# Patient Record
Sex: MALE | Race: WHITE | Employment: OTHER | URBAN - METROPOLITAN AREA
[De-identification: names, ages, dates, MRNs, and addresses within clinical notes are randomized per-mention and may not be internally consistent; named-entity substitution may affect disease eponyms.]

---

## 2018-05-10 PROBLEM — I62.9 INTRACRANIAL HEMORRHAGE (HCC): Status: ACTIVE | Noted: 2018-05-10

## 2018-05-22 PROBLEM — E11.9 DM (DIABETES MELLITUS), TYPE 2 (HCC): Status: ACTIVE | Noted: 2018-05-22

## 2018-05-22 PROBLEM — G45.9 TIA (TRANSIENT ISCHEMIC ATTACK): Status: ACTIVE | Noted: 2018-05-22

## 2018-07-27 ENCOUNTER — OFFICE VISIT (OUTPATIENT)
Dept: VASCULAR SURGERY | Age: 68
End: 2018-07-27

## 2018-07-27 VITALS
SYSTOLIC BLOOD PRESSURE: 130 MMHG | WEIGHT: 170 LBS | DIASTOLIC BLOOD PRESSURE: 68 MMHG | HEIGHT: 67 IN | BODY MASS INDEX: 26.68 KG/M2

## 2018-07-27 DIAGNOSIS — I65.22 LEFT CAROTID ARTERY STENOSIS: ICD-10-CM

## 2018-07-27 PROCEDURE — 3017F COLORECTAL CA SCREEN DOC REV: CPT | Performed by: SURGERY

## 2018-07-27 PROCEDURE — 99204 OFFICE O/P NEW MOD 45 MIN: CPT | Performed by: SURGERY

## 2018-07-27 PROCEDURE — 1123F ACP DISCUSS/DSCN MKR DOCD: CPT | Performed by: SURGERY

## 2018-07-27 PROCEDURE — 1101F PT FALLS ASSESS-DOCD LE1/YR: CPT | Performed by: SURGERY

## 2018-07-27 PROCEDURE — 4040F PNEUMOC VAC/ADMIN/RCVD: CPT | Performed by: SURGERY

## 2018-07-27 PROCEDURE — 1036F TOBACCO NON-USER: CPT | Performed by: SURGERY

## 2018-07-27 PROCEDURE — G8419 CALC BMI OUT NRM PARAM NOF/U: HCPCS | Performed by: SURGERY

## 2018-07-27 PROCEDURE — G8427 DOCREV CUR MEDS BY ELIG CLIN: HCPCS | Performed by: SURGERY

## 2018-07-27 PROCEDURE — G8598 ASA/ANTIPLAT THER USED: HCPCS | Performed by: SURGERY

## 2018-08-01 ENCOUNTER — SURG/PROC ORDERS (OUTPATIENT)
Dept: CARDIOTHORACIC SURGERY | Age: 68
End: 2018-08-01

## 2018-08-01 DIAGNOSIS — I65.22 OCCLUSION OF LEFT CAROTID ARTERY: Primary | ICD-10-CM

## 2018-08-14 ENCOUNTER — TELEPHONE (OUTPATIENT)
Dept: VASCULAR SURGERY | Age: 68
End: 2018-08-14

## 2018-08-14 NOTE — TELEPHONE ENCOUNTER
Called patient as reminder of surgery or 8/23/19 at [facility]. Being admitted on 8/22/18. Arrive at 7:00am as 9:00am is time of csf drain. Told patient to be npo. yuval

## 2018-08-20 ENCOUNTER — ANESTHESIA EVENT (OUTPATIENT)
Dept: OPERATING ROOM | Age: 68
DRG: 254 | End: 2018-08-20
Payer: OTHER GOVERNMENT

## 2018-08-23 ENCOUNTER — TELEPHONE (OUTPATIENT)
Dept: VASCULAR SURGERY | Age: 68
End: 2018-08-23

## 2018-08-25 NOTE — ANESTHESIA PRE PROCEDURE
 ondansetron (ZOFRAN) 4 MG tablet Take 1 tablet by mouth every 8 hours as needed for Nausea 10 tablet 0    insulin glargine (LANTUS) 100 UNIT/ML injection Inject 10 Units into the skin nightly. (Patient taking differently: Inject 20 Units into the skin 2 times daily ) 3 mL 12    pregabalin (LYRICA) 100 MG capsule Take 100 mg by mouth 2 times daily          Allergies: Allergies   Allergen Reactions    Lisinopril      \"Makes me feel goofy\"       Problem List:    Patient Active Problem List   Diagnosis Code    Type II or unspecified type diabetes mellitus without mention of complication, not stated as uncontrolled E11.9    Chronic back pain M54.9, G89.29    Hypertension I10    Hyperlipidemia E78.5    Gout     Carotid artery occlusion I65.29    Cerebrovascular disease I67.9    Intracranial hemorrhage (HCC) I62.9    TIA (transient ischemic attack) G45.9    DM (diabetes mellitus), type 2 ([address] E11.9    Left carotid artery stenosis D58.15       Past Medical History:        Diagnosis Date    Carotid artery occlusion 3/10    60-69% left (neg stress test 5/10)    Cerebral artery occlusion with cerebral infarction ([address]     Cerebrovascular disease 3/10    ? TIA    Chronic back pain      Dr Geovanny Lamas - pain management    Gout     Hyperlipidemia     Hypertension     Routine health maintenance     Type II or unspecified type diabetes mellitus without mention of complication, not stated as uncontrolled     eye exam 5/30       Past Surgical History:        Procedure Laterality Date    PROSTATE SURGERY      neg prostate biopsy 1-09   [facility] SHOULDER SURGERY      JOINT CLEANING    TURP  2017       Social History:    Social History   Substance Use Topics    Smoking status: Never Smoker    Smokeless tobacco: Never Used    Alcohol use No                                Counseling given: Not Answered      Vital Signs (Current):   Vitals:    08/17/18 1455   Weight: 170 lb (77.1 kg)   Height: 5' 7\" (1.702 m) BP Readings from Last 3 Encounters:   07/27/18 130/68   05/24/18 134/84   05/22/18 (!) 128/101       NPO Status:                                                                                 BMI:   Wt Readings from Last 3 Encounters:   07/27/18 170 lb (77.1 kg)   05/22/18 173 lb 8 oz (78.7 kg)   05/22/18 190 lb (86.2 kg)     Body mass index is 26.63 kg/m². CBC:   Lab Results   Component Value Date    WBC 9.6 05/22/2018    RBC 4.54 05/22/2018    HGB 13.9 05/22/2018    HCT 40.0 05/22/2018    MCV 88.0 05/22/2018    RDW 13.2 05/22/2018     05/22/2018       CMP:   Lab Results   Component Value Date     05/22/2018    K 4.5 05/22/2018     05/22/2018    CO2 27 05/22/2018    BUN 19 05/22/2018    CREATININE 1.3 05/22/2018    GFRAA >60 05/22/2018    GFRAA >60 03/01/2010    AGRATIO 1.4 05/22/2018    LABGLOM 55 05/22/2018    GLUCOSE 102 05/22/2018    PROT 8.0 05/22/2018    CALCIUM 10.2 05/22/2018    BILITOT 0.6 05/22/2018    ALKPHOS 108 05/22/2018    AST 16 05/22/2018    ALT 8 05/22/2018       POC Tests: No results for input(s): POCGLU, POCNA, POCK, POCCL, POCBUN, POCHEMO, POCHCT in the last 72 hours.     Coags:   Lab Results   Component Value Date    PROTIME 11.3 05/10/2018    INR 0.97 05/10/2018    APTT 26.8 05/10/2018       HCG (If Applicable): No results found for: PREGTESTUR, PREGSERUM, HCG, HCGQUANT     ABGs: No results found for: PHART, PO2ART, XOZ7SWB, RVD5LUD, BEART, H6VZYMQJ     Type & Screen (If Applicable):  No results found for: [facility]    Anesthesia Evaluation  Patient summary reviewed and Nursing notes reviewed no history of anesthetic complications:   Airway: Mallampati: II     Neck ROM: full   Dental:          Pulmonary:Negative Pulmonary ROS and normal exam                               Cardiovascular:Negative CV ROS    (+) hypertension:, hyperlipidemia                  Neuro/Psych:   Negative Neuro/Psych ROS  (+) CVA:, TIA,

## 2018-08-27 ENCOUNTER — ANESTHESIA (OUTPATIENT)
Dept: OPERATING ROOM | Age: 68
DRG: 254 | End: 2018-08-27
Payer: OTHER GOVERNMENT

## 2018-08-27 ENCOUNTER — HOSPITAL ENCOUNTER (INPATIENT)
Age: 68
LOS: 1 days | Discharge: HOME | DRG: 254 | End: 2018-08-28
Attending: SURGERY | Admitting: SURGERY
Payer: OTHER GOVERNMENT

## 2018-08-27 VITALS
SYSTOLIC BLOOD PRESSURE: 153 MMHG | TEMPERATURE: 96.5 F | DIASTOLIC BLOOD PRESSURE: 83 MMHG | RESPIRATION RATE: 23 BRPM | OXYGEN SATURATION: 100 %

## 2018-08-27 DIAGNOSIS — I65.22 OCCLUSION OF LEFT CAROTID ARTERY: ICD-10-CM

## 2018-08-27 LAB
ANION GAP SERPL CALCULATED.3IONS-SCNC: 12 MMOL/L (ref 3–16)
BASOPHILS ABSOLUTE: 0 K/UL (ref 0–0.2)
BASOPHILS RELATIVE PERCENT: 0.5 %
BILIRUBIN URINE: NEGATIVE
BLOOD, URINE: NEGATIVE
BUN BLDV-MCNC: 23 MG/DL (ref 7–20)
CALCIUM SERPL-MCNC: 9.3 MG/DL (ref 8.3–10.6)
CHLORIDE BLD-SCNC: 102 MMOL/L (ref 99–110)
CLARITY: CLEAR
CO2: 26 MMOL/L (ref 21–32)
COLOR: YELLOW
CREAT SERPL-MCNC: 1 MG/DL (ref 0.8–1.3)
EOSINOPHILS ABSOLUTE: 0.2 K/UL (ref 0–0.6)
EOSINOPHILS RELATIVE PERCENT: 3.9 %
GFR AFRICAN AMERICAN: >60
GFR NON-AFRICAN AMERICAN: >60
GLUCOSE BLD-MCNC: 118 MG/DL (ref 70–99)
GLUCOSE BLD-MCNC: 163 MG/DL (ref 70–99)
GLUCOSE BLD-MCNC: 164 MG/DL (ref 70–99)
GLUCOSE BLD-MCNC: 173 MG/DL (ref 70–99)
GLUCOSE BLD-MCNC: 95 MG/DL (ref 70–99)
GLUCOSE URINE: NEGATIVE MG/DL
HCT VFR BLD CALC: 35.9 % (ref 40.5–52.5)
HEMOGLOBIN: 12.4 G/DL (ref 13.5–17.5)
KETONES, URINE: NEGATIVE MG/DL
LEUKOCYTE ESTERASE, URINE: NEGATIVE
LYMPHOCYTES ABSOLUTE: 1.6 K/UL (ref 1–5.1)
LYMPHOCYTES RELATIVE PERCENT: 28.3 %
MCH RBC QN AUTO: 30.9 PG (ref 26–34)
MCHC RBC AUTO-ENTMCNC: 34.6 G/DL (ref 31–36)
MCV RBC AUTO: 89.3 FL (ref 80–100)
MICROSCOPIC EXAMINATION: NORMAL
MONOCYTES ABSOLUTE: 0.2 K/UL (ref 0–1.3)
MONOCYTES RELATIVE PERCENT: 4.2 %
NEUTROPHILS ABSOLUTE: 3.6 K/UL (ref 1.7–7.7)
NEUTROPHILS RELATIVE PERCENT: 63.1 %
NITRITE, URINE: NEGATIVE
PDW BLD-RTO: 13.4 % (ref 12.4–15.4)
PERFORMED ON: ABNORMAL
PERFORMED ON: NORMAL
PH UA: 6.5
PLATELET # BLD: 175 K/UL (ref 135–450)
PMV BLD AUTO: 7.9 FL (ref 5–10.5)
POTASSIUM REFLEX MAGNESIUM: 4.5 MMOL/L (ref 3.5–5.1)
PROTEIN UA: NEGATIVE MG/DL
RBC # BLD: 4.02 M/UL (ref 4.2–5.9)
SODIUM BLD-SCNC: 140 MMOL/L (ref 136–145)
SPECIFIC GRAVITY UA: 1.01
URINE TYPE: NORMAL
UROBILINOGEN, URINE: 0.2 E.U./DL
WBC # BLD: 5.8 K/UL (ref 4–11)

## 2018-08-27 PROCEDURE — 3600000017 HC SURGERY HYBRID ADDL 15MIN: Performed by: SURGERY

## 2018-08-27 PROCEDURE — 2580000003 HC RX 258: Performed by: ANESTHESIOLOGY

## 2018-08-27 PROCEDURE — 7100000000 HC PACU RECOVERY - FIRST 15 MIN: Performed by: SURGERY

## 2018-08-27 PROCEDURE — 2580000003 HC RX 258: Performed by: SURGERY

## 2018-08-27 PROCEDURE — 35301 RECHANNELING OF ARTERY: CPT | Performed by: SURGERY

## 2018-08-27 PROCEDURE — 2500000003 HC RX 250 WO HCPCS: Performed by: NURSE ANESTHETIST, CERTIFIED REGISTERED

## 2018-08-27 PROCEDURE — 36415 COLL VENOUS BLD VENIPUNCTURE: CPT

## 2018-08-27 PROCEDURE — 3600000007 HC SURGERY HYBRID BASE: Performed by: SURGERY

## 2018-08-27 PROCEDURE — 2709999900 HC NON-CHARGEABLE SUPPLY: Performed by: SURGERY

## 2018-08-27 PROCEDURE — 6370000000 HC RX 637 (ALT 250 FOR IP): Performed by: SURGERY

## 2018-08-27 PROCEDURE — 2720000010 HC SURG SUPPLY STERILE: Performed by: SURGERY

## 2018-08-27 PROCEDURE — 03CL0ZZ EXTIRPATION OF MATTER FROM LEFT INTERNAL CAROTID ARTERY, OPEN APPROACH: ICD-10-PCS | Performed by: SURGERY

## 2018-08-27 PROCEDURE — 6360000002 HC RX W HCPCS: Performed by: SURGERY

## 2018-08-27 PROCEDURE — C1768 GRAFT, VASCULAR: HCPCS | Performed by: SURGERY

## 2018-08-27 PROCEDURE — 3700000001 HC ADD 15 MINUTES (ANESTHESIA): Performed by: SURGERY

## 2018-08-27 PROCEDURE — 7100000001 HC PACU RECOVERY - ADDTL 15 MIN: Performed by: SURGERY

## 2018-08-27 PROCEDURE — 2500000003 HC RX 250 WO HCPCS: Performed by: SURGERY

## 2018-08-27 PROCEDURE — 80048 BASIC METABOLIC PNL TOTAL CA: CPT

## 2018-08-27 PROCEDURE — 83036 HEMOGLOBIN GLYCOSYLATED A1C: CPT

## 2018-08-27 PROCEDURE — 6360000002 HC RX W HCPCS: Performed by: NURSE ANESTHETIST, CERTIFIED REGISTERED

## 2018-08-27 PROCEDURE — 3700000000 HC ANESTHESIA ATTENDED CARE: Performed by: SURGERY

## 2018-08-27 PROCEDURE — 2060000000 HC ICU INTERMEDIATE R&B

## 2018-08-27 PROCEDURE — 94150 VITAL CAPACITY TEST: CPT

## 2018-08-27 PROCEDURE — 2580000003 HC RX 258: Performed by: NURSE ANESTHETIST, CERTIFIED REGISTERED

## 2018-08-27 PROCEDURE — 85025 COMPLETE CBC W/AUTO DIFF WBC: CPT

## 2018-08-27 PROCEDURE — 81003 URINALYSIS AUTO W/O SCOPE: CPT

## 2018-08-27 PROCEDURE — 2780000010 HC IMPLANT OTHER: Performed by: SURGERY

## 2018-08-27 PROCEDURE — 6360000002 HC RX W HCPCS: Performed by: ANESTHESIOLOGY

## 2018-08-27 DEVICE — XENOSURE BIOLOGIC PATCH, 0.8CM X 8CM, EIFU
Type: IMPLANTABLE DEVICE | Site: NECK | Status: FUNCTIONAL
Brand: XENOSURE BIOLOGIC PATCH

## 2018-08-27 RX ORDER — HYDROCODONE BITARTRATE AND ACETAMINOPHEN 5; 325 MG/1; MG/1
2 TABLET ORAL EVERY 4 HOURS PRN
Status: DISCONTINUED | OUTPATIENT
Start: 2018-08-27 | End: 2018-08-28 | Stop reason: HOSPADM

## 2018-08-27 RX ORDER — OXYCODONE HYDROCHLORIDE AND ACETAMINOPHEN 5; 325 MG/1; MG/1
2 TABLET ORAL PRN
Status: DISCONTINUED | OUTPATIENT
Start: 2018-08-27 | End: 2018-08-27 | Stop reason: HOSPADM

## 2018-08-27 RX ORDER — MIDAZOLAM HYDROCHLORIDE 1 MG/ML
INJECTION INTRAMUSCULAR; INTRAVENOUS PRN
Status: DISCONTINUED | OUTPATIENT
Start: 2018-08-27 | End: 2018-08-27 | Stop reason: SDUPTHER

## 2018-08-27 RX ORDER — FENTANYL CITRATE 50 UG/ML
INJECTION, SOLUTION INTRAMUSCULAR; INTRAVENOUS PRN
Status: DISCONTINUED | OUTPATIENT
Start: 2018-08-27 | End: 2018-08-27 | Stop reason: SDUPTHER

## 2018-08-27 RX ORDER — PROTAMINE SULFATE 10 MG/ML
INJECTION, SOLUTION INTRAVENOUS PRN
Status: DISCONTINUED | OUTPATIENT
Start: 2018-08-27 | End: 2018-08-27 | Stop reason: SDUPTHER

## 2018-08-27 RX ORDER — NICOTINE POLACRILEX 4 MG
15 LOZENGE BUCCAL PRN
Status: DISCONTINUED | OUTPATIENT
Start: 2018-08-27 | End: 2018-08-28 | Stop reason: HOSPADM

## 2018-08-27 RX ORDER — DIPHENHYDRAMINE HYDROCHLORIDE 50 MG/ML
12.5 INJECTION INTRAMUSCULAR; INTRAVENOUS
Status: DISCONTINUED | OUTPATIENT
Start: 2018-08-27 | End: 2018-08-27 | Stop reason: HOSPADM

## 2018-08-27 RX ORDER — NITROGLYCERIN 20 MG/100ML
5 INJECTION INTRAVENOUS CONTINUOUS PRN
Status: DISCONTINUED | OUTPATIENT
Start: 2018-08-27 | End: 2018-08-28 | Stop reason: HOSPADM

## 2018-08-27 RX ORDER — EPHEDRINE SULFATE 50 MG/ML
INJECTION INTRAVENOUS PRN
Status: DISCONTINUED | OUTPATIENT
Start: 2018-08-27 | End: 2018-08-27 | Stop reason: SDUPTHER

## 2018-08-27 RX ORDER — NITROGLYCERIN 5 MG/ML
INJECTION, SOLUTION INTRAVENOUS PRN
Status: DISCONTINUED | OUTPATIENT
Start: 2018-08-27 | End: 2018-08-27 | Stop reason: SDUPTHER

## 2018-08-27 RX ORDER — LIDOCAINE HYDROCHLORIDE 20 MG/ML
INJECTION, SOLUTION INFILTRATION; PERINEURAL PRN
Status: DISCONTINUED | OUTPATIENT
Start: 2018-08-27 | End: 2018-08-27 | Stop reason: SDUPTHER

## 2018-08-27 RX ORDER — HYDRALAZINE HYDROCHLORIDE 20 MG/ML
5 INJECTION INTRAMUSCULAR; INTRAVENOUS EVERY 10 MIN PRN
Status: DISCONTINUED | OUTPATIENT
Start: 2018-08-27 | End: 2018-08-27 | Stop reason: HOSPADM

## 2018-08-27 RX ORDER — HEPARIN SODIUM 1000 [USP'U]/ML
INJECTION, SOLUTION INTRAVENOUS; SUBCUTANEOUS PRN
Status: DISCONTINUED | OUTPATIENT
Start: 2018-08-27 | End: 2018-08-27 | Stop reason: SDUPTHER

## 2018-08-27 RX ORDER — ONDANSETRON 4 MG/1
4 TABLET, FILM COATED ORAL EVERY 8 HOURS PRN
Status: DISCONTINUED | OUTPATIENT
Start: 2018-08-27 | End: 2018-08-27 | Stop reason: CLARIF

## 2018-08-27 RX ORDER — DEXTROSE MONOHYDRATE 50 MG/ML
100 INJECTION, SOLUTION INTRAVENOUS PRN
Status: DISCONTINUED | OUTPATIENT
Start: 2018-08-27 | End: 2018-08-28 | Stop reason: HOSPADM

## 2018-08-27 RX ORDER — MEPERIDINE HYDROCHLORIDE 50 MG/ML
12.5 INJECTION INTRAMUSCULAR; INTRAVENOUS; SUBCUTANEOUS EVERY 5 MIN PRN
Status: DISCONTINUED | OUTPATIENT
Start: 2018-08-27 | End: 2018-08-27 | Stop reason: HOSPADM

## 2018-08-27 RX ORDER — OXYCODONE HYDROCHLORIDE AND ACETAMINOPHEN 5; 325 MG/1; MG/1
1 TABLET ORAL PRN
Status: DISCONTINUED | OUTPATIENT
Start: 2018-08-27 | End: 2018-08-27 | Stop reason: HOSPADM

## 2018-08-27 RX ORDER — SODIUM CHLORIDE 9 MG/ML
INJECTION, SOLUTION INTRAVENOUS CONTINUOUS
Status: DISCONTINUED | OUTPATIENT
Start: 2018-08-27 | End: 2018-08-28 | Stop reason: HOSPADM

## 2018-08-27 RX ORDER — SODIUM CHLORIDE, SODIUM LACTATE, POTASSIUM CHLORIDE, CALCIUM CHLORIDE 600; 310; 30; 20 MG/100ML; MG/100ML; MG/100ML; MG/100ML
INJECTION, SOLUTION INTRAVENOUS
Status: COMPLETED
Start: 2018-08-27 | End: 2018-08-27

## 2018-08-27 RX ORDER — ONDANSETRON 2 MG/ML
4 INJECTION INTRAMUSCULAR; INTRAVENOUS PRN
Status: DISCONTINUED | OUTPATIENT
Start: 2018-08-27 | End: 2018-08-27 | Stop reason: HOSPADM

## 2018-08-27 RX ORDER — MORPHINE SULFATE 4 MG/ML
2 INJECTION, SOLUTION INTRAMUSCULAR; INTRAVENOUS EVERY 5 MIN PRN
Status: DISCONTINUED | OUTPATIENT
Start: 2018-08-27 | End: 2018-08-27 | Stop reason: HOSPADM

## 2018-08-27 RX ORDER — MORPHINE SULFATE 4 MG/ML
1 INJECTION, SOLUTION INTRAMUSCULAR; INTRAVENOUS EVERY 5 MIN PRN
Status: DISCONTINUED | OUTPATIENT
Start: 2018-08-27 | End: 2018-08-27 | Stop reason: HOSPADM

## 2018-08-27 RX ORDER — LABETALOL HYDROCHLORIDE 5 MG/ML
INJECTION, SOLUTION INTRAVENOUS PRN
Status: DISCONTINUED | OUTPATIENT
Start: 2018-08-27 | End: 2018-08-27 | Stop reason: SDUPTHER

## 2018-08-27 RX ORDER — ONDANSETRON 2 MG/ML
INJECTION INTRAMUSCULAR; INTRAVENOUS PRN
Status: DISCONTINUED | OUTPATIENT
Start: 2018-08-27 | End: 2018-08-27 | Stop reason: SDUPTHER

## 2018-08-27 RX ORDER — MORPHINE SULFATE 4 MG/ML
4 INJECTION, SOLUTION INTRAMUSCULAR; INTRAVENOUS
Status: DISCONTINUED | OUTPATIENT
Start: 2018-08-27 | End: 2018-08-28 | Stop reason: HOSPADM

## 2018-08-27 RX ORDER — ONDANSETRON 4 MG/1
4 TABLET, ORALLY DISINTEGRATING ORAL EVERY 8 HOURS PRN
Status: DISCONTINUED | OUTPATIENT
Start: 2018-08-27 | End: 2018-08-28 | Stop reason: HOSPADM

## 2018-08-27 RX ORDER — PROMETHAZINE HYDROCHLORIDE 25 MG/ML
6.25 INJECTION, SOLUTION INTRAMUSCULAR; INTRAVENOUS
Status: DISCONTINUED | OUTPATIENT
Start: 2018-08-27 | End: 2018-08-27 | Stop reason: HOSPADM

## 2018-08-27 RX ORDER — MORPHINE SULFATE 2 MG/ML
2 INJECTION, SOLUTION INTRAMUSCULAR; INTRAVENOUS
Status: DISCONTINUED | OUTPATIENT
Start: 2018-08-27 | End: 2018-08-28 | Stop reason: HOSPADM

## 2018-08-27 RX ORDER — ATORVASTATIN CALCIUM 10 MG/1
20 TABLET, FILM COATED ORAL DAILY
Status: DISCONTINUED | OUTPATIENT
Start: 2018-08-27 | End: 2018-08-28 | Stop reason: HOSPADM

## 2018-08-27 RX ORDER — GLYCOPYRROLATE 0.2 MG/ML
INJECTION INTRAMUSCULAR; INTRAVENOUS PRN
Status: DISCONTINUED | OUTPATIENT
Start: 2018-08-27 | End: 2018-08-27 | Stop reason: SDUPTHER

## 2018-08-27 RX ORDER — SODIUM CHLORIDE 0.9 % (FLUSH) 0.9 %
10 SYRINGE (ML) INJECTION PRN
Status: DISCONTINUED | OUTPATIENT
Start: 2018-08-27 | End: 2018-08-28 | Stop reason: HOSPADM

## 2018-08-27 RX ORDER — CLONIDINE HYDROCHLORIDE 0.1 MG/1
0.1 TABLET ORAL
Status: DISCONTINUED | OUTPATIENT
Start: 2018-08-27 | End: 2018-08-28 | Stop reason: HOSPADM

## 2018-08-27 RX ORDER — MORPHINE SULFATE 15 MG/1
15 TABLET, FILM COATED, EXTENDED RELEASE ORAL 3 TIMES DAILY
Status: DISCONTINUED | OUTPATIENT
Start: 2018-08-27 | End: 2018-08-28 | Stop reason: HOSPADM

## 2018-08-27 RX ORDER — HYDRALAZINE HYDROCHLORIDE 20 MG/ML
10 INJECTION INTRAMUSCULAR; INTRAVENOUS
Status: DISCONTINUED | OUTPATIENT
Start: 2018-08-27 | End: 2018-08-28 | Stop reason: HOSPADM

## 2018-08-27 RX ORDER — LABETALOL HYDROCHLORIDE 5 MG/ML
5 INJECTION, SOLUTION INTRAVENOUS EVERY 10 MIN PRN
Status: DISCONTINUED | OUTPATIENT
Start: 2018-08-27 | End: 2018-08-27 | Stop reason: HOSPADM

## 2018-08-27 RX ORDER — SODIUM CHLORIDE, SODIUM LACTATE, POTASSIUM CHLORIDE, CALCIUM CHLORIDE 600; 310; 30; 20 MG/100ML; MG/100ML; MG/100ML; MG/100ML
INJECTION, SOLUTION INTRAVENOUS CONTINUOUS
Status: DISCONTINUED | OUTPATIENT
Start: 2018-08-27 | End: 2018-08-27

## 2018-08-27 RX ORDER — DEXTROSE MONOHYDRATE 25 G/50ML
12.5 INJECTION, SOLUTION INTRAVENOUS PRN
Status: DISCONTINUED | OUTPATIENT
Start: 2018-08-27 | End: 2018-08-28 | Stop reason: HOSPADM

## 2018-08-27 RX ORDER — PROPOFOL 10 MG/ML
INJECTION, EMULSION INTRAVENOUS PRN
Status: DISCONTINUED | OUTPATIENT
Start: 2018-08-27 | End: 2018-08-27 | Stop reason: SDUPTHER

## 2018-08-27 RX ORDER — ACETAMINOPHEN 325 MG/1
650 TABLET ORAL EVERY 4 HOURS PRN
Status: DISCONTINUED | OUTPATIENT
Start: 2018-08-27 | End: 2018-08-28 | Stop reason: HOSPADM

## 2018-08-27 RX ORDER — ASPIRIN 81 MG/1
81 TABLET, CHEWABLE ORAL DAILY
Status: DISCONTINUED | OUTPATIENT
Start: 2018-08-27 | End: 2018-08-28 | Stop reason: HOSPADM

## 2018-08-27 RX ORDER — ONDANSETRON 2 MG/ML
4 INJECTION INTRAMUSCULAR; INTRAVENOUS EVERY 6 HOURS PRN
Status: DISCONTINUED | OUTPATIENT
Start: 2018-08-27 | End: 2018-08-28 | Stop reason: HOSPADM

## 2018-08-27 RX ORDER — INSULIN GLARGINE 100 [IU]/ML
20 INJECTION, SOLUTION SUBCUTANEOUS 2 TIMES DAILY
Status: DISCONTINUED | OUTPATIENT
Start: 2018-08-27 | End: 2018-08-28 | Stop reason: HOSPADM

## 2018-08-27 RX ORDER — ROCURONIUM BROMIDE 10 MG/ML
INJECTION, SOLUTION INTRAVENOUS PRN
Status: DISCONTINUED | OUTPATIENT
Start: 2018-08-27 | End: 2018-08-27 | Stop reason: SDUPTHER

## 2018-08-27 RX ORDER — SODIUM CHLORIDE 0.9 % (FLUSH) 0.9 %
10 SYRINGE (ML) INJECTION EVERY 12 HOURS SCHEDULED
Status: DISCONTINUED | OUTPATIENT
Start: 2018-08-27 | End: 2018-08-28 | Stop reason: HOSPADM

## 2018-08-27 RX ORDER — DOCUSATE SODIUM 100 MG/1
100 CAPSULE, LIQUID FILLED ORAL DAILY
Status: DISCONTINUED | OUTPATIENT
Start: 2018-08-27 | End: 2018-08-28 | Stop reason: HOSPADM

## 2018-08-27 RX ORDER — LIDOCAINE HYDROCHLORIDE 10 MG/ML
1 INJECTION, SOLUTION EPIDURAL; INFILTRATION; INTRACAUDAL; PERINEURAL
Status: DISCONTINUED | OUTPATIENT
Start: 2018-08-27 | End: 2018-08-27 | Stop reason: HOSPADM

## 2018-08-27 RX ORDER — HYDROCODONE BITARTRATE AND ACETAMINOPHEN 5; 325 MG/1; MG/1
1 TABLET ORAL EVERY 4 HOURS PRN
Status: DISCONTINUED | OUTPATIENT
Start: 2018-08-27 | End: 2018-08-28 | Stop reason: HOSPADM

## 2018-08-27 RX ADMIN — SUGAMMADEX 200 MG: 100 INJECTION, SOLUTION INTRAVENOUS at 08:55

## 2018-08-27 RX ADMIN — SODIUM CHLORIDE, POTASSIUM CHLORIDE, SODIUM LACTATE AND CALCIUM CHLORIDE: 600; 310; 30; 20 INJECTION, SOLUTION INTRAVENOUS at 09:00

## 2018-08-27 RX ADMIN — SODIUM CHLORIDE, POTASSIUM CHLORIDE, SODIUM LACTATE AND CALCIUM CHLORIDE: 600; 310; 30; 20 INJECTION, SOLUTION INTRAVENOUS at 07:29

## 2018-08-27 RX ADMIN — ROCURONIUM BROMIDE 50 MG: 10 SOLUTION INTRAVENOUS at 07:29

## 2018-08-27 RX ADMIN — PROTAMINE SULFATE 25 MG: 10 INJECTION, SOLUTION INTRAVENOUS at 08:47

## 2018-08-27 RX ADMIN — LIDOCAINE HYDROCHLORIDE 20 MG: 20 INJECTION, SOLUTION INFILTRATION; PERINEURAL at 07:29

## 2018-08-27 RX ADMIN — NITROGLYCERIN 30 MCG: 5 INJECTION, SOLUTION INTRAVENOUS at 08:45

## 2018-08-27 RX ADMIN — ATORVASTATIN CALCIUM 20 MG: 10 TABLET, FILM COATED ORAL at 12:46

## 2018-08-27 RX ADMIN — GLYCOPYRROLATE 0.2 MG: 0.2 INJECTION, SOLUTION INTRAMUSCULAR; INTRAVENOUS at 08:10

## 2018-08-27 RX ADMIN — LABETALOL HYDROCHLORIDE 10 MG: 5 INJECTION, SOLUTION INTRAVENOUS at 08:12

## 2018-08-27 RX ADMIN — MORPHINE SULFATE 15 MG: 15 TABLET, FILM COATED, EXTENDED RELEASE ORAL at 21:56

## 2018-08-27 RX ADMIN — INSULIN LISPRO 2 UNITS: 100 INJECTION, SOLUTION INTRAVENOUS; SUBCUTANEOUS at 17:56

## 2018-08-27 RX ADMIN — DOCUSATE SODIUM 100 MG: 100 CAPSULE, LIQUID FILLED ORAL at 12:46

## 2018-08-27 RX ADMIN — LABETALOL HYDROCHLORIDE 10 MG: 5 INJECTION, SOLUTION INTRAVENOUS at 08:45

## 2018-08-27 RX ADMIN — PHENYLEPHRINE HYDROCHLORIDE 40 MCG: 10 INJECTION INTRAVENOUS at 08:18

## 2018-08-27 RX ADMIN — METOPROLOL TARTRATE 12.5 MG: 25 TABLET ORAL at 21:56

## 2018-08-27 RX ADMIN — EPHEDRINE SULFATE 10 MG: 50 INJECTION INTRAVENOUS at 07:45

## 2018-08-27 RX ADMIN — MIDAZOLAM HYDROCHLORIDE 2 MG: 2 INJECTION, SOLUTION INTRAMUSCULAR; INTRAVENOUS at 07:29

## 2018-08-27 RX ADMIN — HEPARIN SODIUM 5000 UNITS: 1000 INJECTION, SOLUTION INTRAVENOUS; SUBCUTANEOUS at 08:05

## 2018-08-27 RX ADMIN — ASPIRIN 81 MG 81 MG: 81 TABLET ORAL at 12:46

## 2018-08-27 RX ADMIN — SODIUM CHLORIDE, PRESERVATIVE FREE 10 ML: 5 INJECTION INTRAVENOUS at 23:51

## 2018-08-27 RX ADMIN — HYDROMORPHONE HYDROCHLORIDE 0.5 MG: 1 INJECTION, SOLUTION INTRAMUSCULAR; INTRAVENOUS; SUBCUTANEOUS at 10:44

## 2018-08-27 RX ADMIN — NITROGLYCERIN 50 MCG: 5 INJECTION, SOLUTION INTRAVENOUS at 09:00

## 2018-08-27 RX ADMIN — EPHEDRINE SULFATE 10 MG: 50 INJECTION INTRAVENOUS at 07:35

## 2018-08-27 RX ADMIN — ONDANSETRON 4 MG: 2 INJECTION INTRAMUSCULAR; INTRAVENOUS at 07:35

## 2018-08-27 RX ADMIN — FENTANYL CITRATE 100 MCG: 50 INJECTION INTRAMUSCULAR; INTRAVENOUS at 07:29

## 2018-08-27 RX ADMIN — MORPHINE SULFATE 15 MG: 15 TABLET, FILM COATED, EXTENDED RELEASE ORAL at 14:33

## 2018-08-27 RX ADMIN — EPHEDRINE SULFATE 10 MG: 50 INJECTION INTRAVENOUS at 08:00

## 2018-08-27 RX ADMIN — LABETALOL HYDROCHLORIDE 10 MG: 5 INJECTION, SOLUTION INTRAVENOUS at 09:00

## 2018-08-27 RX ADMIN — HYDROCODONE BITARTRATE AND ACETAMINOPHEN 2 TABLET: 5; 325 TABLET ORAL at 17:59

## 2018-08-27 RX ADMIN — MORPHINE SULFATE 4 MG: 4 INJECTION INTRAVENOUS at 12:49

## 2018-08-27 RX ADMIN — INSULIN GLARGINE 20 UNITS: 100 INJECTION, SOLUTION SUBCUTANEOUS at 21:56

## 2018-08-27 RX ADMIN — SODIUM CHLORIDE: 9 INJECTION, SOLUTION INTRAVENOUS at 15:32

## 2018-08-27 RX ADMIN — NITROGLYCERIN 30 MCG: 5 INJECTION, SOLUTION INTRAVENOUS at 08:13

## 2018-08-27 RX ADMIN — EPHEDRINE SULFATE 10 MG: 50 INJECTION INTRAVENOUS at 07:40

## 2018-08-27 RX ADMIN — PREGABALIN 100 MG: 75 CAPSULE ORAL at 21:56

## 2018-08-27 RX ADMIN — PROPOFOL 190 MG: 10 INJECTION, EMULSION INTRAVENOUS at 07:29

## 2018-08-27 RX ADMIN — Medication 2 G: at 07:25

## 2018-08-27 ASSESSMENT — PULMONARY FUNCTION TESTS
PIF_VALUE: 18
PIF_VALUE: 21
PIF_VALUE: 17
PIF_VALUE: 21
PIF_VALUE: 21
PIF_VALUE: 20
PIF_VALUE: 21
PIF_VALUE: 20
PIF_VALUE: 3
PIF_VALUE: 21
PIF_VALUE: 20
PIF_VALUE: 0
PIF_VALUE: 19
PIF_VALUE: 20
PIF_VALUE: 20
PIF_VALUE: 4
PIF_VALUE: 21
PIF_VALUE: 20
PIF_VALUE: 18
PIF_VALUE: 21
PIF_VALUE: 17
PIF_VALUE: 0
PIF_VALUE: 21
PIF_VALUE: 18
PIF_VALUE: 18
PIF_VALUE: 21
PIF_VALUE: 18
PIF_VALUE: 21
PIF_VALUE: 28
PIF_VALUE: 21
PIF_VALUE: 0
PIF_VALUE: 0
PIF_VALUE: 21
PIF_VALUE: 22
PIF_VALUE: 21
PIF_VALUE: 0
PIF_VALUE: 18
PIF_VALUE: 20
PIF_VALUE: 3
PIF_VALUE: 20
PIF_VALUE: 21
PIF_VALUE: 6
PIF_VALUE: 21
PIF_VALUE: 18
PIF_VALUE: 17
PIF_VALUE: 18
PIF_VALUE: 23
PIF_VALUE: 21
PIF_VALUE: 23
PIF_VALUE: 18
PIF_VALUE: 22
PIF_VALUE: 21
PIF_VALUE: 21
PIF_VALUE: 3
PIF_VALUE: 0
PIF_VALUE: 20
PIF_VALUE: 26
PIF_VALUE: 21
PIF_VALUE: 21
PIF_VALUE: 20
PIF_VALUE: 21
PIF_VALUE: 2
PIF_VALUE: 21
PIF_VALUE: 19
PIF_VALUE: 20
PIF_VALUE: 21
PIF_VALUE: 21
PIF_VALUE: 22
PIF_VALUE: 21
PIF_VALUE: 3
PIF_VALUE: 21
PIF_VALUE: 20
PIF_VALUE: 18
PIF_VALUE: 21
PIF_VALUE: 21
PIF_VALUE: 18
PIF_VALUE: 21
PIF_VALUE: 0
PIF_VALUE: 21
PIF_VALUE: 20
PIF_VALUE: 15
PIF_VALUE: 17
PIF_VALUE: 21
PIF_VALUE: 24
PIF_VALUE: 20
PIF_VALUE: 21
PIF_VALUE: 0
PIF_VALUE: 21
PIF_VALUE: 18
PIF_VALUE: 4
PIF_VALUE: 0
PIF_VALUE: 15
PIF_VALUE: 3
PIF_VALUE: 21

## 2018-08-27 ASSESSMENT — PAIN - FUNCTIONAL ASSESSMENT: PAIN_FUNCTIONAL_ASSESSMENT: 0-10

## 2018-08-27 ASSESSMENT — PAIN SCALES - GENERAL
PAINLEVEL_OUTOF10: 7
PAINLEVEL_OUTOF10: 7
PAINLEVEL_OUTOF10: 4
PAINLEVEL_OUTOF10: 5
PAINLEVEL_OUTOF10: 7
PAINLEVEL_OUTOF10: 6
PAINLEVEL_OUTOF10: 5

## 2018-08-27 ASSESSMENT — PAIN DESCRIPTION - DESCRIPTORS: DESCRIPTORS: ACHING;BURNING;NUMBNESS

## 2018-08-27 NOTE — H&P
H and P    Patient here today to undergo Left Carotid Endarterectomy for high grade asymptomatic stenosis. This was noted in May when he presented with an intracranial bleed in the right posterior cerebral distribution. Etiology of ICH unclear but was felt to be related to HTN as no aneurysms or AVMs identified. Past Medical History:   Diagnosis Date    Carotid artery occlusion 3/10    60-69% left (neg stress test 5/10)    Cerebral artery occlusion with cerebral infarction [facility])     Cerebrovascular disease 3/10    ? TIA    Chronic back pain      Dr Praveen Sandhu - pain management    Gout     Hyperlipidemia     Hypertension     Routine health maintenance     Type II or unspecified type diabetes mellitus without mention of complication, not stated as uncontrolled     eye exam 5/30     Social History:    History   Smoking Status    Never Smoker   Smokeless Tobacco    Never Used     Current Medications:  Current Facility-Administered Medications   Medication Dose Route Frequency Provider Last Rate Last Dose    lactated ringers infusion   Intravenous Continuous Uli Mccall MD        lidocaine PF 1 % injection 1 mL  1 mL Intradermal Once PRN Uli Mccall MD        ceFAZolin (ANCEF) 2 g in sterile water 20 mL IV syringe  2 g Intravenous On Call to Karine Do MD        lactated ringers infusion             HYDROmorphone (DILAUDID) injection 0.25 mg  0.25 mg Intravenous Q5 Min PRN Corry Garcia MD        HYDROmorphone (DILAUDID) injection 0.5 mg  0.5 mg Intravenous Q5 Min PRN Corry Garcia MD        morphine (PF) injection 1 mg  1 mg Intravenous Q5 Min PRN Corry Garcia MD        morphine (PF) injection 2 mg  2 mg Intravenous Q5 Min PRN Corry Garcia MD        oxyCODONE-acetaminophen (PERCOCET) 5-325 MG per tablet 1 tablet  1 tablet Oral PRN Corry Garcia MD        Or    oxyCODONE-acetaminophen (PERCOCET) 5-325 MG per tablet 2 tablet  2 tablet Oral PRN Corry Garcia MD       [address] [place] [address]

## 2018-08-27 NOTE — PLAN OF CARE
Problem: Falls - Risk of:  Goal: Will remain free from falls  Will remain free from falls   Outcome: Ongoing  Pt high fall risk. Instructed to use call light before getting out of bed. Call light within reach. Bed in low position. Bed alarm on. Will continue to monitor. Problem: Risk for Impaired Skin Integrity  Goal: Tissue integrity - skin and mucous membranes  Structural intactness and normal physiological function of skin and  mucous membranes. Outcome: Ongoing  Pt is a Q2H, educated pt on preventing skin breakdown. See flowsheet for assessment    Problem: Pain:  Goal: Pain level will decrease  Pain level will decrease   Outcome: Ongoing  Pt states he undestands the 0-10 numeric pain scale. Pt also states that his pain is being adequately treated and will reach out to report changes.

## 2018-08-27 NOTE — PROGRESS NOTES
Vitals:    08/27/18 1130   BP: 117/68   Pulse: 78   Resp: 11   Temp: 98.2 °F (36.8 °C)   SpO2: 97%     Pt resting quietly in bed alert and oriented. Pt having pain rated 7/10 at this time but denies having further needs. Lactate Ringers infusing at 75 ml/hr. Bed locked in lowest position, call light within reach, bedside table within reach. Will continue to monitor.

## 2018-08-27 NOTE — BRIEF OP NOTE
Brief Postoperative Note  ______________________________________________________________    Patient: Claus Almendarez  YOB: 1950  MRN: [id number]  Date of Procedure: 8/27/2018    Pre-Op Diagnosis: LEFT CAROTID ARTERY STENOSIS    Post-Op Diagnosis: Same       Procedure(s):  LEFT CAROTID ENDARTERECTOMY    Anesthesia: General    Surgeon(s):  Yoseph Stanley MD    Staff:  Surgical Assistant: Andre Holter  Scrub Person First: Chetna Black     Estimated Blood Loss: <26 ml    Complications: None    Implants:    Implant Name Type Inv. Item Serial No.  Lot No. LRB No. Used   LELA-PATCH GRAFT VASC . [id number] . 045MM XENOSURE LF Mesh LELA-PATCH GRAFT VASC . [id number] . 045MM XENOSURE LF   [facility] [employer] [id number] Left 1         Drains:   Closed/Suction Drain Left Neck Bulb 7 [facility] (Active)         Yoseph Stanley MD  Date: 8/27/2018  Time: 8:56 AM

## 2018-08-28 VITALS
OXYGEN SATURATION: 97 % | SYSTOLIC BLOOD PRESSURE: 162 MMHG | RESPIRATION RATE: 16 BRPM | WEIGHT: 170 LBS | HEIGHT: 67 IN | BODY MASS INDEX: 26.68 KG/M2 | HEART RATE: 72 BPM | DIASTOLIC BLOOD PRESSURE: 91 MMHG | TEMPERATURE: 98.2 F

## 2018-08-28 LAB
ESTIMATED AVERAGE GLUCOSE: 168.6 MG/DL
GLUCOSE BLD-MCNC: 109 MG/DL (ref 70–99)
GLUCOSE BLD-MCNC: 118 MG/DL (ref 70–99)
HBA1C MFR BLD: 7.5 %
HCT VFR BLD CALC: 34.9 % (ref 40.5–52.5)
HEMOGLOBIN: 11.9 G/DL (ref 13.5–17.5)
MCH RBC QN AUTO: 30.7 PG (ref 26–34)
MCHC RBC AUTO-ENTMCNC: 34.1 G/DL (ref 31–36)
MCV RBC AUTO: 90.1 FL (ref 80–100)
PDW BLD-RTO: 13.7 % (ref 12.4–15.4)
PERFORMED ON: ABNORMAL
PERFORMED ON: ABNORMAL
PLATELET # BLD: 160 K/UL (ref 135–450)
PMV BLD AUTO: 7.4 FL (ref 5–10.5)
RBC # BLD: 3.87 M/UL (ref 4.2–5.9)
WBC # BLD: 6.9 K/UL (ref 4–11)

## 2018-08-28 PROCEDURE — 2580000003 HC RX 258: Performed by: SURGERY

## 2018-08-28 PROCEDURE — 36415 COLL VENOUS BLD VENIPUNCTURE: CPT

## 2018-08-28 PROCEDURE — 85027 COMPLETE CBC AUTOMATED: CPT

## 2018-08-28 PROCEDURE — 6370000000 HC RX 637 (ALT 250 FOR IP): Performed by: SURGERY

## 2018-08-28 RX ADMIN — HYDROCODONE BITARTRATE AND ACETAMINOPHEN 2 TABLET: 5; 325 TABLET ORAL at 11:25

## 2018-08-28 RX ADMIN — DOCUSATE SODIUM 100 MG: 100 CAPSULE, LIQUID FILLED ORAL at 09:58

## 2018-08-28 RX ADMIN — PREGABALIN 100 MG: 75 CAPSULE ORAL at 09:57

## 2018-08-28 RX ADMIN — SODIUM CHLORIDE, PRESERVATIVE FREE 10 ML: 5 INJECTION INTRAVENOUS at 09:58

## 2018-08-28 RX ADMIN — INSULIN GLARGINE 20 UNITS: 100 INJECTION, SOLUTION SUBCUTANEOUS at 10:00

## 2018-08-28 RX ADMIN — ATORVASTATIN CALCIUM 20 MG: 10 TABLET, FILM COATED ORAL at 09:57

## 2018-08-28 RX ADMIN — METOPROLOL TARTRATE 12.5 MG: 25 TABLET ORAL at 09:58

## 2018-08-28 RX ADMIN — MORPHINE SULFATE 15 MG: 15 TABLET, FILM COATED, EXTENDED RELEASE ORAL at 09:58

## 2018-08-28 RX ADMIN — ASPIRIN 81 MG 81 MG: 81 TABLET ORAL at 09:58

## 2018-08-28 RX ADMIN — HYDROCODONE BITARTRATE AND ACETAMINOPHEN 2 TABLET: 5; 325 TABLET ORAL at 05:00

## 2018-08-28 ASSESSMENT — PAIN DESCRIPTION - LOCATION
LOCATION: BACK

## 2018-08-28 ASSESSMENT — PAIN DESCRIPTION - PAIN TYPE
TYPE: CHRONIC PAIN

## 2018-08-28 ASSESSMENT — PAIN SCALES - GENERAL
PAINLEVEL_OUTOF10: 9
PAINLEVEL_OUTOF10: 8
PAINLEVEL_OUTOF10: 8
PAINLEVEL_OUTOF10: 5
PAINLEVEL_OUTOF10: 8
PAINLEVEL_OUTOF10: 9

## 2018-08-28 ASSESSMENT — PAIN DESCRIPTION - FREQUENCY: FREQUENCY: CONTINUOUS

## 2018-08-28 NOTE — PROGRESS NOTES
Page sent to Dr. Vamsi Taylor regarding discharge and B/P, currently 173/80 after a.m B/P medication given. Awaiting return call. Kam Awan  8/28/2018    1317 writer received call from Dr. Vamsi Taylor, made aware of B/P, ok for pt to go home, pt to call office for follow up appointment in 2 weeks.   Kam Awan  8/28/2018

## 2018-08-28 NOTE — PROGRESS NOTES
Order for pt to be discharged. IV and tele monitor removed. Tele monitor returned to nurses station. Information provided and reviewed on carotid endarterectomy along with activity - no strenuous activity or exercise for 2 weeks, diet, incision care and signs/symptoms to call MD/Krystle with positive teach back. Pt is to call Dr. Deirdre Joshi office for 2 week follow up appointment; number provided. Pt states will call to make an appointment. Pt verbalizes understanding of all discharge instructions having no further questions. Pt transported to exit via wheel chair, with family.   Casi Arriaga  8/28/2018

## 2018-08-28 NOTE — PROGRESS NOTES
Vascular Surgery Progress Note      SUBJECTIVE:  C/o chronic back pain.   No new c/o    OBJECTIVE    Physical  CURRENT VITALS:  /77   Pulse 71   Temp 97.7 °F (36.5 °C) (Oral)   Resp 16   Ht 5' 7\" (1.702 m)   Wt 170 lb (77.1 kg)   SpO2 91%   BMI 26.63 kg/m²   24 HR INTAKE/OUTPUT:    Intake/Output Summary (Last 24 hours) at 08/28/18 0703  Last data filed at 08/28/18 0443   Gross per 24 hour   Intake             1870 ml   Output             1040 ml   Net              830 ml     Incision intact  No hematoma  Tongue midline  Neuro grossly normal    Data  CBC:   Lab Results   Component Value Date    WBC 6.9 08/28/2018    RBC 3.87 08/28/2018    HGB 11.9 08/28/2018    HCT 34.9 08/28/2018    MCV 90.1 08/28/2018    MCH 30.7 08/28/2018    MCHC 34.1 08/28/2018    RDW 13.7 08/28/2018     08/28/2018    MPV 7.4 08/28/2018         ASSESSMENT AND PLAN    POD #1 L CEA   COLIN removed    Home today

## 2018-08-28 NOTE — OP NOTE
[place], [place] [address]                                 OPERATIVE REPORT    PATIENT NAME: Isauro Nicholas                     :        1950  MED REC NO:   [id number]                          ROOM:       8099  ACCOUNT NO:   [de-identified]                           ADMIT DATE: 2018  PROVIDER:     Suresh Lopez MD    DATE OF PROCEDURE:  2018    PREOPERATIVE DIAGNOSIS:  High-grade left carotid stenosis. POSTOPERATIVE DIAGNOSIS:  High-grade left carotid stenosis. PROCEDURE:  Left carotid endarterectomy. ANESTHESIA:  General endotracheal.    INDICATIONS:  The patient is 42-year-old male with known carotid disease. Recent duplex examination showed significant increase in velocities. CT  angiogram confirmed an 80 to 90% proximal stenosis. The patient is brought  to the operating room to undergo endarterectomy for stroke prevention. PROCEDURE:  The patient is brought to the operating room, placed in the  supine position. General endotracheal anesthesia induced. After adequate  anesthesia, the left neck was prepped and draped in sterile fashion. A  small incision was made along the medial border of sternocleidomastoid  muscle, carried down through subcutaneous tissue and platysma using  cautery. Common facial vein was identified, ligated and divided between  2-0 silk ties and 3-0 silk suture ligatures. The jugular vein was then  retracted laterally exposing the carotid bifurcation. The common internal  and external carotid arteries were dissected above and below the  bifurcation leaving the bifurcation region undisturbed. The vagus nerve  and hypoglossal nerve were identified and preserved. The patient was given  5000 units of intravenous heparin.   After approximately 3 minutes, the  vessel loops around the external carotid artery were constricted occluding  flow and the common carotid artery was

## 2018-08-28 NOTE — FLOWSHEET NOTE
Color/Condition   Skin Color/Condition (WDL) WDL   Skin Integrity   Skin Integrity (WDL) WDL   Musculoskeletal   Musculoskeletal (WDL) WDL   Genitourinary   Genitourinary (WDL) WDL   Anus/Rectum   Anus/Rectum (WDL) WDL   Incision 08/27/18 Neck Left   Date First Assessed/Time First Assessed: 08/27/18 0901   Location: Neck  Wound Location Orientation: Left   Wound Assessment Clean;Dry; Intact   Becky-wound Assessment Clean;Dry; Intact   Closure Surgical glue   Drainage Amount None   Dressing/Treatment Surgical glue   Dressing Changed Changed/New   Dressing Status Clean;Dry; Intact   Psychosocial   Psychosocial (WDL) WDL

## 2018-08-28 NOTE — PLAN OF CARE
Problem: Falls - Risk of:  Goal: Will remain free from falls  Will remain free from falls   Outcome: Ongoing  Patients bed alarm is on and in place, patient alert to call out for assistance, will continue to monitor for risk of falls on my shift.   Cj Brandon RN

## 2018-09-13 ENCOUNTER — HOSPITAL ENCOUNTER (OUTPATIENT)
Age: 68
Setting detail: OBSERVATION
Discharge: HOME | End: 2018-09-14
Attending: INTERNAL MEDICINE | Admitting: INTERNAL MEDICINE
Payer: OTHER GOVERNMENT

## 2018-09-13 ENCOUNTER — APPOINTMENT (OUTPATIENT)
Dept: CT IMAGING | Age: 68
End: 2018-09-13
Payer: OTHER GOVERNMENT

## 2018-09-13 ENCOUNTER — APPOINTMENT (OUTPATIENT)
Dept: GENERAL RADIOLOGY | Age: 68
End: 2018-09-13
Payer: OTHER GOVERNMENT

## 2018-09-13 ENCOUNTER — HOSPITAL ENCOUNTER (EMERGENCY)
Age: 68
Discharge: ANOTHER HOSPITAL | End: 2018-09-13
Attending: EMERGENCY MEDICINE
Payer: OTHER GOVERNMENT

## 2018-09-13 VITALS
HEART RATE: 56 BPM | OXYGEN SATURATION: 100 % | DIASTOLIC BLOOD PRESSURE: 90 MMHG | SYSTOLIC BLOOD PRESSURE: 185 MMHG | RESPIRATION RATE: 15 BRPM

## 2018-09-13 DIAGNOSIS — R42 DIZZINESS: Primary | ICD-10-CM

## 2018-09-13 LAB
A/G RATIO: 1.6 (ref 1.1–2.2)
ALBUMIN SERPL-MCNC: 4.6 G/DL (ref 3.4–5)
ALP BLD-CCNC: 131 U/L (ref 40–129)
ALT SERPL-CCNC: 20 U/L (ref 10–40)
ANION GAP SERPL CALCULATED.3IONS-SCNC: 14 MMOL/L (ref 3–16)
AST SERPL-CCNC: 24 U/L (ref 15–37)
BASOPHILS ABSOLUTE: 0 K/UL (ref 0–0.2)
BASOPHILS RELATIVE PERCENT: 0.7 %
BILIRUB SERPL-MCNC: 0.6 MG/DL (ref 0–1)
BUN BLDV-MCNC: 21 MG/DL (ref 7–20)
CALCIUM SERPL-MCNC: 9.6 MG/DL (ref 8.3–10.6)
CHLORIDE BLD-SCNC: 100 MMOL/L (ref 99–110)
CO2: 26 MMOL/L (ref 21–32)
CREAT SERPL-MCNC: 1.1 MG/DL (ref 0.8–1.3)
EOSINOPHILS ABSOLUTE: 0.3 K/UL (ref 0–0.6)
EOSINOPHILS RELATIVE PERCENT: 4.9 %
GFR AFRICAN AMERICAN: >60
GFR NON-AFRICAN AMERICAN: >60
GLOBULIN: 2.8 G/DL
GLUCOSE BLD-MCNC: 183 MG/DL (ref 70–99)
HCT VFR BLD CALC: 36.5 % (ref 40.5–52.5)
HEMOGLOBIN: 12.3 G/DL (ref 13.5–17.5)
INR BLD: 0.99 (ref 0.86–1.14)
LYMPHOCYTES ABSOLUTE: 1.1 K/UL (ref 1–5.1)
LYMPHOCYTES RELATIVE PERCENT: 18.1 %
MCH RBC QN AUTO: 30.9 PG (ref 26–34)
MCHC RBC AUTO-ENTMCNC: 33.8 G/DL (ref 31–36)
MCV RBC AUTO: 91.6 FL (ref 80–100)
MONOCYTES ABSOLUTE: 0.2 K/UL (ref 0–1.3)
MONOCYTES RELATIVE PERCENT: 3.4 %
NEUTROPHILS ABSOLUTE: 4.5 K/UL (ref 1.7–7.7)
NEUTROPHILS RELATIVE PERCENT: 72.9 %
PDW BLD-RTO: 13.5 % (ref 12.4–15.4)
PLATELET # BLD: 195 K/UL (ref 135–450)
PMV BLD AUTO: 7.7 FL (ref 5–10.5)
POTASSIUM SERPL-SCNC: 4.9 MMOL/L (ref 3.5–5.1)
PROTHROMBIN TIME: 11.3 SEC (ref 9.8–13)
RBC # BLD: 3.98 M/UL (ref 4.2–5.9)
SODIUM BLD-SCNC: 140 MMOL/L (ref 136–145)
TOTAL PROTEIN: 7.4 G/DL (ref 6.4–8.2)
TROPONIN: <0.01 NG/ML
WBC # BLD: 6.2 K/UL (ref 4–11)

## 2018-09-13 PROCEDURE — 85025 COMPLETE CBC W/AUTO DIFF WBC: CPT

## 2018-09-13 PROCEDURE — 6360000002 HC RX W HCPCS: Performed by: EMERGENCY MEDICINE

## 2018-09-13 PROCEDURE — 6360000004 HC RX CONTRAST MEDICATION: Performed by: EMERGENCY MEDICINE

## 2018-09-13 PROCEDURE — 93010 ELECTROCARDIOGRAM REPORT: CPT | Performed by: INTERNAL MEDICINE

## 2018-09-13 PROCEDURE — 99285 EMERGENCY DEPT VISIT HI MDM: CPT

## 2018-09-13 PROCEDURE — 70498 CT ANGIOGRAPHY NECK: CPT

## 2018-09-13 PROCEDURE — 96374 THER/PROPH/DIAG INJ IV PUSH: CPT

## 2018-09-13 PROCEDURE — 84484 ASSAY OF TROPONIN QUANT: CPT

## 2018-09-13 PROCEDURE — 6370000000 HC RX 637 (ALT 250 FOR IP): Performed by: EMERGENCY MEDICINE

## 2018-09-13 PROCEDURE — 70496 CT ANGIOGRAPHY HEAD: CPT

## 2018-09-13 PROCEDURE — 71045 X-RAY EXAM CHEST 1 VIEW: CPT

## 2018-09-13 PROCEDURE — G0378 HOSPITAL OBSERVATION PER HR: HCPCS

## 2018-09-13 PROCEDURE — 1200000000 HC SEMI PRIVATE

## 2018-09-13 PROCEDURE — 96375 TX/PRO/DX INJ NEW DRUG ADDON: CPT

## 2018-09-13 PROCEDURE — G0379 DIRECT REFER HOSPITAL OBSERV: HCPCS

## 2018-09-13 PROCEDURE — 80053 COMPREHEN METABOLIC PANEL: CPT

## 2018-09-13 PROCEDURE — 70450 CT HEAD/BRAIN W/O DYE: CPT

## 2018-09-13 PROCEDURE — 93005 ELECTROCARDIOGRAM TRACING: CPT | Performed by: EMERGENCY MEDICINE

## 2018-09-13 PROCEDURE — 85610 PROTHROMBIN TIME: CPT

## 2018-09-13 PROCEDURE — 36415 COLL VENOUS BLD VENIPUNCTURE: CPT

## 2018-09-13 RX ORDER — MORPHINE SULFATE 15 MG/1
15 TABLET, FILM COATED, EXTENDED RELEASE ORAL 3 TIMES DAILY
Status: DISCONTINUED | OUTPATIENT
Start: 2018-09-14 | End: 2018-09-14 | Stop reason: HOSPADM

## 2018-09-13 RX ORDER — DOCUSATE SODIUM 100 MG/1
100 CAPSULE, LIQUID FILLED ORAL DAILY
Status: DISCONTINUED | OUTPATIENT
Start: 2018-09-14 | End: 2018-09-14 | Stop reason: HOSPADM

## 2018-09-13 RX ORDER — MORPHINE SULFATE 4 MG/ML
4 INJECTION, SOLUTION INTRAMUSCULAR; INTRAVENOUS ONCE
Status: COMPLETED | OUTPATIENT
Start: 2018-09-13 | End: 2018-09-13

## 2018-09-13 RX ORDER — SODIUM CHLORIDE 0.9 % (FLUSH) 0.9 %
10 SYRINGE (ML) INJECTION EVERY 12 HOURS SCHEDULED
Status: DISCONTINUED | OUTPATIENT
Start: 2018-09-13 | End: 2018-09-14 | Stop reason: HOSPADM

## 2018-09-13 RX ORDER — MORPHINE SULFATE 15 MG/1
30 TABLET, FILM COATED, EXTENDED RELEASE ORAL 3 TIMES DAILY
Status: DISCONTINUED | OUTPATIENT
Start: 2018-09-14 | End: 2018-09-13

## 2018-09-13 RX ORDER — ATORVASTATIN CALCIUM 40 MG/1
40 TABLET, FILM COATED ORAL DAILY
Status: DISCONTINUED | OUTPATIENT
Start: 2018-09-14 | End: 2018-09-14 | Stop reason: HOSPADM

## 2018-09-13 RX ORDER — ONDANSETRON 4 MG/1
4 TABLET, ORALLY DISINTEGRATING ORAL EVERY 8 HOURS PRN
Status: DISCONTINUED | OUTPATIENT
Start: 2018-09-13 | End: 2018-09-14 | Stop reason: HOSPADM

## 2018-09-13 RX ORDER — METOPROLOL TARTRATE 50 MG/1
TABLET, FILM COATED ORAL
Status: DISCONTINUED
Start: 2018-09-13 | End: 2018-09-13 | Stop reason: HOSPADM

## 2018-09-13 RX ORDER — ONDANSETRON 2 MG/ML
4 INJECTION INTRAMUSCULAR; INTRAVENOUS EVERY 6 HOURS PRN
Status: DISCONTINUED | OUTPATIENT
Start: 2018-09-13 | End: 2018-09-14 | Stop reason: HOSPADM

## 2018-09-13 RX ORDER — ASPIRIN 81 MG/1
81 TABLET, CHEWABLE ORAL DAILY
Status: DISCONTINUED | OUTPATIENT
Start: 2018-09-14 | End: 2018-09-14 | Stop reason: HOSPADM

## 2018-09-13 RX ORDER — INSULIN GLARGINE 100 [IU]/ML
10 INJECTION, SOLUTION SUBCUTANEOUS NIGHTLY
Status: DISCONTINUED | OUTPATIENT
Start: 2018-09-13 | End: 2018-09-14 | Stop reason: HOSPADM

## 2018-09-13 RX ORDER — DEXTROSE MONOHYDRATE 50 MG/ML
100 INJECTION, SOLUTION INTRAVENOUS PRN
Status: DISCONTINUED | OUTPATIENT
Start: 2018-09-13 | End: 2018-09-14 | Stop reason: HOSPADM

## 2018-09-13 RX ORDER — DEXTROSE MONOHYDRATE 25 G/50ML
12.5 INJECTION, SOLUTION INTRAVENOUS PRN
Status: DISCONTINUED | OUTPATIENT
Start: 2018-09-13 | End: 2018-09-14 | Stop reason: HOSPADM

## 2018-09-13 RX ORDER — NICOTINE POLACRILEX 4 MG
15 LOZENGE BUCCAL PRN
Status: DISCONTINUED | OUTPATIENT
Start: 2018-09-13 | End: 2018-09-14 | Stop reason: HOSPADM

## 2018-09-13 RX ORDER — HYDRALAZINE HYDROCHLORIDE 20 MG/ML
5 INJECTION INTRAMUSCULAR; INTRAVENOUS ONCE
Status: COMPLETED | OUTPATIENT
Start: 2018-09-13 | End: 2018-09-13

## 2018-09-13 RX ORDER — SODIUM CHLORIDE 0.9 % (FLUSH) 0.9 %
10 SYRINGE (ML) INJECTION PRN
Status: DISCONTINUED | OUTPATIENT
Start: 2018-09-13 | End: 2018-09-14 | Stop reason: HOSPADM

## 2018-09-13 RX ADMIN — IOPAMIDOL 75 ML: 755 INJECTION, SOLUTION INTRAVENOUS at 14:52

## 2018-09-13 RX ADMIN — MORPHINE SULFATE 4 MG: 4 INJECTION INTRAVENOUS at 19:23

## 2018-09-13 RX ADMIN — HYDRALAZINE HYDROCHLORIDE 5 MG: 20 INJECTION INTRAMUSCULAR; INTRAVENOUS at 21:44

## 2018-09-13 RX ADMIN — METOPROLOL TARTRATE 12.5 MG: 50 TABLET ORAL at 19:17

## 2018-09-13 ASSESSMENT — PAIN SCALES - GENERAL: PAINLEVEL_OUTOF10: 6

## 2018-09-13 NOTE — ED NOTES
Pt to ER via EMS with reported sudden onset of dizziness and HTN @ approxx 1100 today. Pt states he now feels \"almost back to normal.\" States dizziness only occurs when he turns his head and is only \"slightly\" there. Speech clear, pt A & O x 3 . Neuro checks WNL. Pt denies further c/o's.  Cardiac monitor applied, MD @ bedside     Monae Elena RN  09/13/18 4813

## 2018-09-13 NOTE — ED NOTES
Pt remains alert and without distress, denies c/o's dizziness @ present.  DIOGENES Brewer RN  09/13/18 7801

## 2018-09-13 NOTE — ED NOTES
Medic answered call light. Patient stated he wanted to know what was going on with his treatment and what he was waiting on. Stated his back was hurting and he couldn't sit much longer. Patient assisted to chair next to bed and remained on telemetry. Patient stated he was feeling better. RN notified.       Jack Horn  09/13/18 1990

## 2018-09-13 NOTE — ED PROVIDER NOTES
[facility]  EMERGENCY DEPT VISIT      Patient Identification  Shawn Lord is a 69 y.o. male. Chief Complaint   Dizziness (States sudden onset of dizziness with HBP @ approx 1100 this AM) and Hypertension      History of Present Illness: This is a  69 y.o. male who presents via ambulance to the ED with complaints of dizziness which started approximately 11:00 this morning. He describes it as being off balance and dizzy or spinning. He was comfortable by nausea and some difficulty with gait. No diplopia. Mild headache. No chest pain or shortness of breath. He has chronic left-sided numbness for a prior stroke which is unchanged but no new weakness today. He took a dizzy pill at around 11:30 to see if that would help but after 2 hours noticed no improvement so drove himself to the [facility] to be checked. He did note that his blood pressure was markedly elevated during this episode with systolics over 523. He was still hypertensive when he arrived at the [facility] and EMS was called. En route his blood pressure improved and his dizziness has also improved. Patient doesn't history of a hemorrhagic stroke in May. He also recently underwent a left carotid endarterectomy 2 weeks ago. He is currently on baby aspirin. Past Medical History:   Diagnosis Date    Carotid artery occlusion 3/10    60-69% left (neg stress test 5/10)    Cerebral artery occlusion with cerebral infarction [facility])     Cerebrovascular disease 3/10    ? TIA    Chronic back pain      Dr Sumaya Avery - pain management    Gout     Hyperlipidemia     Hypertension     Routine health maintenance     Type II or unspecified type diabetes mellitus without mention of complication, not stated as uncontrolled     eye exam 5/30       Past Surgical History:   Procedure Laterality Date    CATARACT REMOVAL WITH IMPLANT Right     CYSTOSCOPY      MT REOPER, CAROTID ENDARTEC>1 MON Left 8/27/2018    LEFT CAROTID [facility]AR[facility]ECTOMY performed by [facility] at [facility] OR    PROSTATE SURGERY      neg prostate biopsy 1-09    SHOULDER SURGERY      JOINT CLEANING    TURP  2017       No current facility-administered medications for this encounter. Current Outpatient Prescriptions:     aspirin 81 MG chewable tablet, Take 1 tablet by mouth daily, Disp: 30 tablet, Rfl: 3    morphine (GARRET) 30 MG extended release capsule, Take 15 mg by mouth three times daily. ., Disp: , Rfl:     docusate sodium (COLACE) 100 MG capsule, Take 1 capsule by mouth daily, Disp: 30 capsule, Rfl: 3    atorvastatin (LIPITOR) 10 MG tablet, Take 20 mg by mouth daily, Disp: , Rfl:     metoprolol tartrate (LOPRESSOR) 25 MG tablet, Take 12.5 mg by mouth 2 times daily, Disp: , Rfl:     ondansetron (ZOFRAN) 4 MG tablet, Take 1 tablet by mouth every 8 hours as needed for Nausea, Disp: 10 tablet, Rfl: 0    pregabalin (LYRICA) 100 MG capsule, Take 100 mg by mouth 2 times daily , Disp: , Rfl:     insulin glargine (LANTUS) 100 UNIT/ML injection, Inject 10 Units into the skin nightly. (Patient taking differently: Inject 20 Units into the skin 2 times daily ), Disp: 3 mL, Rfl: 12    Allergies   Allergen Reactions    Lisinopril      \"Makes me feel goofy\"       Social History     Social History    Marital status:      Spouse name: Eliazar Wong Number of children: 4    Years of education: N/A     Occupational History    Not on file.      Social History Main Topics    Smoking status: Never Smoker    Smokeless tobacco: Never Used    Alcohol use No    Drug use: No    Sexual activity: Yes     Partners: Female     Other Topics Concern    Not on file     Social History Narrative    No narrative on file       Nursing Notes Reviewed      ROS:  GENERAL:  No fever, no chills, no diaphoresis, no appetite changes  EYES: no eye discharge, no eye redness, no visual changes  ENT: no nasal congestion, no sore throat  CARDIAC: no chest pain, no palpitations, no leg swelling  PULM: no cough, no shortness of items in the order listed. Record performance in each category after each subscale exam. Do not go back and change scores. Follow directions provided for each exam technique. Scores should reflect what the patient does, not what the clinician thinks the patient can do. The clinician should record answers while administering the exam and work quickly. Except where indicated, the patient should not be coached (i.e., repeated requests to patient to make a special effort). 1a  Level of consciousness: 0=alert; keenly responsive   1b. LOC questions:  0=Performs both tasks correctly   1c. LOC commands: 0=Performs both tasks correctly   2. Best Gaze: 0=normal   3. Visual: 0=No visual loss   4. Facial Palsy: 0=Normal symmetric movement   5a. Motor left arm: 0=No drift, limb holds 90 (or 45) degrees for full 10 seconds   5b. Motor right arm: 0=No drift, limb holds 90 (or 45) degrees for full 10 seconds   6a. motor left le=No drift, limb holds 90 (or 45) degrees for full 10 seconds   6b  Motor right le=No drift, limb holds 90 (or 45) degrees for full 10 seconds   7. Limb Ataxia: 0=Absent   8. Sensory: 1=Mild to moderate sensory loss; patient feels pinprick is less sharp or is dull on the affected side; there is a loss of superficial pain with pinprick but patient is aware He is being touched (this is chronic)   9. Best Language:  0=No aphasia, normal   10. Dysarthria: 0=Normal   11. Extinction and Inattention: 0=No abnormality   12. Distal motor function: 0=Normal    Total:  1     DERMATOLOGIC: No petechiae, rashes, or ecchymoses. No erythema. PSYCH: normal mood and affect. Normal thought content. ED COURSE AND MEDICAL DECISION MAKING:      EKG as interpreted by myself:  normal sinus rhythm with a rate of 65  Axis is   Normal  QTc is  normal  Intervals and Durations are unremarkable. No specific ST-T wave changes appreciated. No evidence of acute ischemia.    Compared to prior EKG dated 5/10/18, no Atrial Rate 65 BPM    P-R Interval 148 ms    QRS Duration 72 ms    Q-T Interval 414 ms    QTc Calculation (Bazett) 430 ms    P Axis 39 degrees    R Axis -23 degrees    T Axis 62 degrees    Diagnosis       Normal sinus rhythmNormal ECGNo previous ECGs available       Treatment in the department:  Patient received the following while in the ED. Medications   metoprolol tartrate (LOPRESSOR) 50 MG tablet (not administered)   iopamidol (ISOVUE-370) 76 % injection 75 mL (75 mLs Intravenous Given 9/13/18 1452)   metoprolol tartrate (LOPRESSOR) tablet 12.5 mg (12.5 mg Oral Given 9/13/18 1917)   morphine (PF) injection 4 mg (4 mg Intravenous Given 9/13/18 1923)         Repeat exam shows dizziness has resolved    Medical decision making:    t-PA NOT given due to the following EXCLUSION CRITERIA (only those checked):  [] Pregnancy  [x] Symptoms > 3 hours of onset  [] Minor or isolated neurological signs  [x] Rapid improvement of stroke symptoms  [] Seizure at the same time of stroke symptoms  [] Active bleeding or acute trauma (fracture)  [] Presentation consistent with acute MI or post-MI pericarditis  [] Known intracranial neoplasm, AV malformation or aneurysm  [] CT evidence of intracranial hemorrhage  [x] Any prior history of intracranial hemorrhage  [] Symptoms suggestive of subarachnoid hemorrhage (even if head CT normal)  [] Persistent hypertension (SBP>185 or DBP>110)  [] Glucose < 50 or > 400.   [] Bleeding diathesis, including but not limited to:   -Platelets < 044,801   -Heparin within 48 hours with PTT > normal range   -Current or recent use of anticoagulants (dabagtran, rivaroxaban, or warfarin with     INR > 1.7)  [] Lumbar puncture in past 7 days  [] Arterial puncture at a noncompressible site in past 7 days  [] Major surgery in past 14 days  [] Gastrointestinal or urinary tract hemorrhage in past 21 days  [] Myocardial infarction in past 3 months  [] Stroke, intracranial surgery or serious head trauma in past of this decision and agrees with plan. I have discussed lab and xray findings with patient and they understand. Questions were answered to the best of my ability. Discharge vitals:  Blood pressure (!) 181/90, pulse 65, resp. rate 12, SpO2 100 %. Prescriptions given:   New Prescriptions    No medications on file       This chart was created using Dragon voice recognition software.          Gold Condon MD  09/13/18 1392

## 2018-09-14 ENCOUNTER — APPOINTMENT (OUTPATIENT)
Dept: MRI IMAGING | Age: 68
End: 2018-09-14
Attending: INTERNAL MEDICINE
Payer: OTHER GOVERNMENT

## 2018-09-14 VITALS
OXYGEN SATURATION: 96 % | RESPIRATION RATE: 16 BRPM | BODY MASS INDEX: 26.68 KG/M2 | SYSTOLIC BLOOD PRESSURE: 164 MMHG | TEMPERATURE: 97.8 F | DIASTOLIC BLOOD PRESSURE: 84 MMHG | WEIGHT: 170 LBS | HEART RATE: 60 BPM | HEIGHT: 67 IN

## 2018-09-14 LAB
EKG ATRIAL RATE: 65 BPM
EKG DIAGNOSIS: NORMAL
EKG P AXIS: 39 DEGREES
EKG P-R INTERVAL: 148 MS
EKG Q-T INTERVAL: 414 MS
EKG QRS DURATION: 72 MS
EKG QTC CALCULATION (BAZETT): 430 MS
EKG R AXIS: -23 DEGREES
EKG T AXIS: 62 DEGREES
EKG VENTRICULAR RATE: 65 BPM
GLUCOSE BLD-MCNC: 125 MG/DL (ref 70–99)
GLUCOSE BLD-MCNC: 164 MG/DL (ref 70–99)
GLUCOSE BLD-MCNC: 166 MG/DL (ref 70–99)
GLUCOSE BLD-MCNC: 177 MG/DL (ref 70–99)
PERFORMED ON: ABNORMAL

## 2018-09-14 PROCEDURE — 70551 MRI BRAIN STEM W/O DYE: CPT

## 2018-09-14 PROCEDURE — 2580000003 HC RX 258: Performed by: INTERNAL MEDICINE

## 2018-09-14 PROCEDURE — 99223 1ST HOSP IP/OBS HIGH 75: CPT | Performed by: PSYCHIATRY & NEUROLOGY

## 2018-09-14 PROCEDURE — G0378 HOSPITAL OBSERVATION PER HR: HCPCS

## 2018-09-14 PROCEDURE — 6370000000 HC RX 637 (ALT 250 FOR IP): Performed by: INTERNAL MEDICINE

## 2018-09-14 PROCEDURE — 6370000000 HC RX 637 (ALT 250 FOR IP): Performed by: REGISTERED NURSE

## 2018-09-14 PROCEDURE — 96372 THER/PROPH/DIAG INJ SC/IM: CPT

## 2018-09-14 PROCEDURE — 6360000002 HC RX W HCPCS: Performed by: INTERNAL MEDICINE

## 2018-09-14 RX ORDER — ACETAMINOPHEN 325 MG/1
650 TABLET ORAL EVERY 4 HOURS PRN
Status: DISCONTINUED | OUTPATIENT
Start: 2018-09-14 | End: 2018-09-14 | Stop reason: HOSPADM

## 2018-09-14 RX ADMIN — ASPIRIN 81 MG 81 MG: 81 TABLET ORAL at 08:15

## 2018-09-14 RX ADMIN — INSULIN GLARGINE 10 UNITS: 100 INJECTION, SOLUTION SUBCUTANEOUS at 00:22

## 2018-09-14 RX ADMIN — METOPROLOL TARTRATE 12.5 MG: 25 TABLET ORAL at 08:15

## 2018-09-14 RX ADMIN — ACETAMINOPHEN 650 MG: 325 TABLET ORAL at 15:15

## 2018-09-14 RX ADMIN — DOCUSATE SODIUM 100 MG: 100 CAPSULE, LIQUID FILLED ORAL at 08:15

## 2018-09-14 RX ADMIN — SODIUM CHLORIDE, PRESERVATIVE FREE 10 ML: 5 INJECTION INTRAVENOUS at 00:05

## 2018-09-14 RX ADMIN — METOPROLOL TARTRATE 12.5 MG: 25 TABLET ORAL at 00:05

## 2018-09-14 RX ADMIN — INSULIN LISPRO 1 UNITS: 100 INJECTION, SOLUTION INTRAVENOUS; SUBCUTANEOUS at 13:51

## 2018-09-14 RX ADMIN — MORPHINE SULFATE 15 MG: 15 TABLET, FILM COATED, EXTENDED RELEASE ORAL at 08:15

## 2018-09-14 RX ADMIN — INSULIN LISPRO 1 UNITS: 100 INJECTION, SOLUTION INTRAVENOUS; SUBCUTANEOUS at 17:34

## 2018-09-14 RX ADMIN — SODIUM CHLORIDE, PRESERVATIVE FREE 10 ML: 5 INJECTION INTRAVENOUS at 08:16

## 2018-09-14 RX ADMIN — PREGABALIN 100 MG: 75 CAPSULE ORAL at 00:05

## 2018-09-14 RX ADMIN — PREGABALIN 100 MG: 75 CAPSULE ORAL at 08:14

## 2018-09-14 RX ADMIN — INSULIN LISPRO 1 UNITS: 100 INJECTION, SOLUTION INTRAVENOUS; SUBCUTANEOUS at 08:17

## 2018-09-14 RX ADMIN — ATORVASTATIN CALCIUM 40 MG: 40 TABLET, FILM COATED ORAL at 08:15

## 2018-09-14 RX ADMIN — MORPHINE SULFATE 15 MG: 15 TABLET, FILM COATED, EXTENDED RELEASE ORAL at 00:22

## 2018-09-14 RX ADMIN — MORPHINE SULFATE 15 MG: 15 TABLET, FILM COATED, EXTENDED RELEASE ORAL at 14:45

## 2018-09-14 RX ADMIN — ENOXAPARIN SODIUM 40 MG: 40 INJECTION SUBCUTANEOUS at 08:16

## 2018-09-14 ASSESSMENT — PAIN SCALES - GENERAL
PAINLEVEL_OUTOF10: 5
PAINLEVEL_OUTOF10: 6
PAINLEVEL_OUTOF10: 8
PAINLEVEL_OUTOF10: 7
PAINLEVEL_OUTOF10: 7

## 2018-09-14 ASSESSMENT — PAIN DESCRIPTION - FREQUENCY: FREQUENCY: CONTINUOUS

## 2018-09-14 ASSESSMENT — PAIN DESCRIPTION - DESCRIPTORS
DESCRIPTORS: ACHING;BURNING;NUMBNESS
DESCRIPTORS: ACHING;BURNING;NUMBNESS

## 2018-09-14 ASSESSMENT — PAIN DESCRIPTION - PAIN TYPE
TYPE: CHRONIC PAIN
TYPE: CHRONIC PAIN

## 2018-09-14 ASSESSMENT — PAIN DESCRIPTION - LOCATION
LOCATION: BACK
LOCATION: BACK

## 2018-09-14 ASSESSMENT — PAIN DESCRIPTION - ORIENTATION: ORIENTATION: OTHER (COMMENT)

## 2018-09-14 NOTE — H&P
Hospital Medicine History & Physical      PCP: Liam Velasquez    Date of Admission: 9/13/2018    Date of Service: Pt seen/examined on 9/14/18 and Placed in Observation. Chief Complaint:  Lightheadedness/elevated blood pressures      History Of Present Illness:   69 y.o. male who presented to [facility] with above complaints from [facility] [place] [facility]    This is a 64-year-old man w/chronic left ICA stenosis s/p CEA and recent right basal ganglia ICH in 5/2018 presented to the ER at [facility] [place] with complaints of lightheadedness. Patient reported sudden onset of lightheadedness that started at around 11:30 AM last morning, when he was sitting on the couch and watching TV. He felt like this could be related to his blood pressure and checked it with his machine and he said it was in the 200s. Denies any numbness, tingling or weakness in any of the extremities. He has a prior history of hemorrhagic stroke in his basal ganglia back in May 2018 with chronic left-sided deficits. Has not noticed any increased weakness in any other extremities. Denies any diplopia. Given how he felt in his elevated blood pressure, patient went to see his [place] doctor in the clinic, who checked his blood pressure in the clinic and was found to be in the 200s. EMS was called and patient was transferred to [place] [id number]. [place] ED. Denies any vision problems including blurring of vision or diplopia. He denies any dizziness, vertigo or room spinning sensation. Past Medical History:          Diagnosis Date    Carotid artery occlusion 3/10    60-69% left (neg stress test 5/10)    Cerebral artery occlusion with cerebral infarction [facility])     Cerebrovascular disease 3/10    ? TIA    Chronic back pain      Dr Dunia Li - pain management    Gout     Hyperlipidemia     Hypertension     Routine health maintenance     Type II or unspecified type diabetes mellitus without mention of complication, not stated as uncontrolled     eye exam 5/30 Past Surgical History:          Procedure Laterality Date    CATARACT REMOVAL WITH IMPLANT Right     CYSTOSCOPY      MT REOPER, CAROTID ENDARTEC>1 MON Left 8/27/2018    LEFT CAROTID ENDARTERECTOMY performed by Kanika Mckenzie MD at [facility]      neg prostate biopsy 1-09    SHOULDER SURGERY      JOINT CLEANING    TURP  2017       Medications Prior to Admission:      Prior to Admission medications    Medication Sig Start Date End Date Taking? Authorizing Provider   aspirin 81 MG chewable tablet Take 1 tablet by mouth daily 5/24/18   Zeyad Taylor MD   morphine (GARRET) 30 MG extended release capsule Take 15 mg by mouth three times daily. Ron Medicus Historical Provider, MD   docusate sodium (COLACE) 100 MG capsule Take 1 capsule by mouth daily 5/12/18   Annamarie Burgess MD   atorvastatin (LIPITOR) 10 MG tablet Take 20 mg by mouth daily    Historical Provider, MD   metoprolol tartrate (LOPRESSOR) 25 MG tablet Take 12.5 mg by mouth 2 times daily    Historical Provider, MD   ondansetron (ZOFRAN) 4 MG tablet Take 1 tablet by mouth every 8 hours as needed for Nausea 7/27/17   Luis Antonio Buenrostro MD   insulin glargine (LANTUS) 100 UNIT/ML injection Inject 10 Units into the skin nightly. Patient taking differently: Inject 20 Units into the skin 2 times daily  6/1/10 8/27/18  Raeann Padilla MD   pregabalin (LYRICA) 100 MG capsule Take 100 mg by mouth 2 times daily  3/18/10   Historical Provider, MD       Allergies:  Lisinopril    Social History:      The patient currently lives At home  TOBACCO:   reports that he has never smoked. He has never used smokeless tobacco.  ETOH:   reports that he does not drink alcohol. Family History:  Reviewed in detail and negative for DM, CAD, Cancer, CVA. REVIEW OF SYSTEMS:   Pertinent positives as noted in the HPI. All other systems reviewed and negative.     PHYSICAL EXAM PERFORMED:    /63   Pulse 57   Temp 97.9 °F (36.6 °C) (Oral)   Resp 16   Ht type 2   - Resume Lantus, had SSI with Accu-Cheks and hypoglycemic precautions    Hypertension  - Controlled, resume home meds    Hyperlipidemia  - Resume statin    DVT Prophylaxis: Lovenox  Diet: DIET CARB CONTROL;  Code Status: Full Code    PT/OT Eval Status: na    Dispo - observation       Geronimo Zambrano MD    Thank you Desirae Tanner for the opportunity to be involved in this patient's care. If you have any questions or concerns please feel free to contact me at [phone].

## 2018-09-14 NOTE — DISCHARGE SUMMARY
normal bowel sounds. Musculoskeletal:  No clubbing, cyanosis or edema bilaterally. Full range of motion without deformity. Skin: Skin color, texture, turgor normal.  No rashes or lesions. Neurologic:  Neurovascularly intact without any focal sensory/motor deficits. Cranial nerves: II-XII intact, grossly non-focal.  Psychiatric:  Alert and oriented, thought content appropriate, normal insight  Capillary Refill: Brisk,< 3 seconds   Peripheral Pulses: +2 palpable, equal bilaterally       Labs: For convenience and continuity at follow-up the following most recent labs are provided:      CBC:    Lab Results   Component Value Date    WBC 6.2 09/13/2018    HGB 12.3 09/13/2018    HCT 36.5 09/13/2018     09/13/2018       Renal:    Lab Results   Component Value Date     09/13/2018    K 4.9 09/13/2018    K 4.5 08/27/2018     09/13/2018    CO2 26 09/13/2018    BUN 21 09/13/2018    CREATININE 1.1 09/13/2018    CALCIUM 9.6 09/13/2018         Significant Diagnostic Studies    Radiology:   MRI BRAIN WO CONTRAST   Preliminary Result   No acute intracranial abnormality. Consults:     IP CONSULT TO NEUROLOGY    Disposition:  Home      Condition at Discharge: Stable    Discharge Instructions/Follow-up:  Follow-up with PCP     Code Status:  Full Code     Activity: activity as tolerated    Diet: diabetic diet      Discharge Medications:     Current Discharge Medication List           Details   aspirin 81 MG chewable tablet Take 1 tablet by mouth daily  Qty: 30 tablet, Refills: 3      morphine (GARRET) 30 MG extended release capsule Take 15 mg by mouth three times daily. Thyra Hermanns       docusate sodium (COLACE) 100 MG capsule Take 1 capsule by mouth daily  Qty: 30 capsule, Refills: 3      atorvastatin (LIPITOR) 10 MG tablet Take 20 mg by mouth daily      metoprolol tartrate (LOPRESSOR) 25 MG tablet Take 12.5 mg by mouth 2 times daily      ondansetron (ZOFRAN) 4 MG tablet Take 1 tablet by mouth every 8 hours as

## 2018-09-14 NOTE — CONSULTS
In patient Neurology consult        [facility]      MD Mirian Delacruz  1950    Date of Service: 9/14/2018    Referring Physician: Alaina Yo MD      Reason for the consult: acute dizziness and possible stroke    HPI:   The patient is a 69y.o.  years old male with history of hypertension, hyperlipidemia and father recent stroke was admitted to [facility] last night with acute dizziness and possible new stroke. Symptoms started yesterday morning. He reports sudden onset of severe lightheadedness and dizziness without spinning sensation. No clear triggers or other associated symptoms orheadache, chest pain, dysphagia or dysarthria or falling. He was somewhat unsteady. No prior history of dizziness. No recent change in medications. He went to outside ER where his blood pressure was elevated above 200. CT head showed no acute bleeding. He was transferred to [facility] for further workup. Today he feels somewhat better. No more severe dizziness and lightheadedness but slightly unsteady. No headaches or chest pain. Further workup with CT followed by CTA of the head and neck which by report showed no severe ICA stenosis bilaterally and evidence of recent left CEA. ? Possible lung nodules. The patient is scheduled for MRI. No other new symptoms today. Other review of system was unremarkable. Past Medical History:   Diagnosis Date    Carotid artery occlusion 3/10    60-69% left (neg stress test 5/10)    Cerebral artery occlusion with cerebral infarction [facility])     Cerebrovascular disease 3/10    ? TIA    Chronic back pain      Dr Oquendo Pu - pain management    Gout     Hyperlipidemia     Hypertension     Routine health maintenance     Type II or unspecified type diabetes mellitus without mention of complication, not stated as uncontrolled     eye exam 5/30     No family history on file.   Past Surgical History:   Procedure Laterality Date    attention span and concentration. Cranial Nerves:   II: Visual fields: Full to confrontation  III: Pupils: equal, round, reactive to light  III,IV,VI: Extra Ocular Movements are intact. No nystagmus  V: Facial sensation is intact to pin prick and light touch  VII: Facial strength and movements: intact and symmetric  VIII: Hearing: Intact to finger rub bilaterally  IX: Palate elevation is symmetric  XI: Shoulder shrug is intact  XII: Tongue movements are normal  Musculoskeletal: 5/5 in all 4 extremities. Normal tone. Reflexes: Bilateral biceps 2/4, triceps 2/4, brachial radialis 2/4, knee 2/4 and ankle 2/4. Planters: flexor bilaterally. Coordination: no pronator drift, no dysmetria with FNF testing. Sensation: normal to all modalities. Gait/Posture: steady    Data:  LABS:   Lab Results   Component Value Date     09/13/2018    K 4.9 09/13/2018    K 4.5 08/27/2018     09/13/2018    CO2 26 09/13/2018    BUN 21 09/13/2018    CREATININE 1.1 09/13/2018    GFRAA >60 09/13/2018    GFRAA >60 03/01/2010    LABGLOM >60 09/13/2018    GLUCOSE 183 09/13/2018    MG 2.10 05/12/2018    CALCIUM 9.6 09/13/2018     Lab Results   Component Value Date    WBC 6.2 09/13/2018    RBC 3.98 09/13/2018    HGB 12.3 09/13/2018    HCT 36.5 09/13/2018    MCV 91.6 09/13/2018    RDW 13.5 09/13/2018     09/13/2018     Lab Results   Component Value Date    INR 0.99 09/13/2018    PROTIME 11.3 09/13/2018       Neuroimaging and/or  labs reviewed by me and discussed results with the patient    Impression:  Acute dizziness and ataxia. Possible hypertensive encephalopathy or urgency. Agree with MRI to rule out new ischemic stroke. Recent right basal ganglia ICH  Recent left CEA  Hypertension, poorly controlled  Hyperlipidemia   Diabetes, poorly controlled. A1c 7.5    Recommendation:  MRI brain  PT and OT  Blood pressure monitor and control.   Goal pressure is 160/90  Aspirin  Statin  Telemetry  Insulin sliding scale  Blood sugar monitoring  Neurochecks  DVT and GI prophylaxis  Secondary stroke prevention and risk of poorly controlled hypertension was discussed with the patient  DC planning when medically stable. Will follow           Thank you for referring such patient. If you have any questions regarding my consult note, please don't hesitate to call me. Etta Leora MD  [phone]    This dictation was generated by voice recognition computer software.  Although all attempts are made to edit the dictation for accuracy, there may be errors in the  transcription that are not intended

## 2018-09-14 NOTE — ED NOTES
PATIENT MEDICATED AS ORDERED. SITTING AT BEDSIDE IN CHAIR DUE TO BACK PAIN. DENIES NEEDS AT THIS TIME. PATIENT WALKED TO BATHROOM WITH STEADY GAIT.      Lala Abbott RN  09/13/18 1996

## 2018-09-14 NOTE — CARE COORDINATION
CASE MANAGEMENT INITIAL ASSESSMENT      Reviewed chart and met with patient today, re: 69 yr old male   Explained Case Management role/services. Family present: None  Primary contact information: Wife Juan A Reyes [phone]    Admit date/status: 09/13 Obs   Diagnosis: Dizziness    Insurance: [employer] required for XIFIN, N        3 night stay required - Y, N    Living arrangements, Adls, care needs, prior to admission: Pt lives in private home with his wife. He reports being independent in ADLS and drives. He states that he occasionally uses a cane when walking long distances because of his back. Transportation: [address] at home: Walker__Cane_x_RTS__ BSC__Shower Chair__  02__ HHN__ CPAP__  BiPap__  Hospital Bed__ W/C___ Other__________    Services in the home and/or outpatient, prior to admission: None    PT/OT recs: Not ordered    Hospital Exemption Notification (HEN): Not needed    Barriers to discharge: None    Plan/comments: Pt plans to return home with his wife. He denies needs at this time. Please contact CM if needs arise.      Ariella Palma RN

## 2020-02-16 ENCOUNTER — APPOINTMENT (OUTPATIENT)
Dept: CT IMAGING | Age: 70
End: 2020-02-16
Payer: MEDICARE

## 2020-02-16 ENCOUNTER — APPOINTMENT (OUTPATIENT)
Dept: GENERAL RADIOLOGY | Age: 70
End: 2020-02-16
Payer: MEDICARE

## 2020-02-16 ENCOUNTER — HOSPITAL ENCOUNTER (EMERGENCY)
Age: 70
Discharge: ANOTHER HOSPITAL | End: 2020-02-16
Attending: EMERGENCY MEDICINE
Payer: MEDICARE

## 2020-02-16 VITALS
WEIGHT: 160 LBS | OXYGEN SATURATION: 98 % | RESPIRATION RATE: 23 BRPM | DIASTOLIC BLOOD PRESSURE: 105 MMHG | BODY MASS INDEX: 24.25 KG/M2 | SYSTOLIC BLOOD PRESSURE: 175 MMHG | HEART RATE: 74 BPM | TEMPERATURE: 98.3 F | HEIGHT: 68 IN

## 2020-02-16 LAB
A/G RATIO: 1.6 (ref 1.1–2.2)
ALBUMIN SERPL-MCNC: 4.7 G/DL (ref 3.4–5)
ALP BLD-CCNC: 140 U/L (ref 40–129)
ALT SERPL-CCNC: 16 U/L (ref 10–40)
ANION GAP SERPL CALCULATED.3IONS-SCNC: 15 MMOL/L (ref 3–16)
AST SERPL-CCNC: 24 U/L (ref 15–37)
BASOPHILS ABSOLUTE: 0 K/UL (ref 0–0.2)
BASOPHILS RELATIVE PERCENT: 0.6 %
BILIRUB SERPL-MCNC: 0.3 MG/DL (ref 0–1)
BILIRUBIN URINE: NEGATIVE
BLOOD, URINE: NEGATIVE
BUN BLDV-MCNC: 28 MG/DL (ref 7–20)
CALCIUM SERPL-MCNC: 10.3 MG/DL (ref 8.3–10.6)
CHLORIDE BLD-SCNC: 102 MMOL/L (ref 99–110)
CLARITY: CLEAR
CO2: 22 MMOL/L (ref 21–32)
COLOR: YELLOW
CREAT SERPL-MCNC: 1.5 MG/DL (ref 0.8–1.3)
EKG ATRIAL RATE: 72 BPM
EKG DIAGNOSIS: NORMAL
EKG P AXIS: 15 DEGREES
EKG P-R INTERVAL: 156 MS
EKG Q-T INTERVAL: 396 MS
EKG QRS DURATION: 80 MS
EKG QTC CALCULATION (BAZETT): 433 MS
EKG R AXIS: -29 DEGREES
EKG T AXIS: 91 DEGREES
EKG VENTRICULAR RATE: 72 BPM
EOSINOPHILS ABSOLUTE: 0.1 K/UL (ref 0–0.6)
EOSINOPHILS RELATIVE PERCENT: 2.6 %
GFR AFRICAN AMERICAN: 56
GFR NON-AFRICAN AMERICAN: 46
GLOBULIN: 2.9 G/DL
GLUCOSE BLD-MCNC: 148 MG/DL (ref 70–99)
GLUCOSE URINE: >=1000 MG/DL
HCT VFR BLD CALC: 40 % (ref 40.5–52.5)
HEMOGLOBIN: 13.6 G/DL (ref 13.5–17.5)
INR BLD: 0.93 (ref 0.86–1.14)
KETONES, URINE: ABNORMAL MG/DL
LEUKOCYTE ESTERASE, URINE: NEGATIVE
LYMPHOCYTES ABSOLUTE: 1.5 K/UL (ref 1–5.1)
LYMPHOCYTES RELATIVE PERCENT: 30.5 %
MAGNESIUM: 1.8 MG/DL (ref 1.8–2.4)
MCH RBC QN AUTO: 32.8 PG (ref 26–34)
MCHC RBC AUTO-ENTMCNC: 34.1 G/DL (ref 31–36)
MCV RBC AUTO: 96.1 FL (ref 80–100)
MICROSCOPIC EXAMINATION: ABNORMAL
MONOCYTES ABSOLUTE: 0.2 K/UL (ref 0–1.3)
MONOCYTES RELATIVE PERCENT: 4 %
NEUTROPHILS ABSOLUTE: 3.2 K/UL (ref 1.7–7.7)
NEUTROPHILS RELATIVE PERCENT: 62.3 %
NITRITE, URINE: NEGATIVE
PDW BLD-RTO: 13.7 % (ref 12.4–15.4)
PH UA: 7.5 (ref 5–8)
PLATELET # BLD: 162 K/UL (ref 135–450)
PLATELET SLIDE REVIEW: ABNORMAL
PMV BLD AUTO: 7.8 FL (ref 5–10.5)
POTASSIUM SERPL-SCNC: 5.5 MMOL/L (ref 3.5–5.1)
PRO-BNP: 264 PG/ML (ref 0–124)
PROTEIN UA: NEGATIVE MG/DL
PROTHROMBIN TIME: 10.8 SEC (ref 10–13.2)
RBC # BLD: 4.17 M/UL (ref 4.2–5.9)
SODIUM BLD-SCNC: 139 MMOL/L (ref 136–145)
SPECIFIC GRAVITY UA: 1.01 (ref 1–1.03)
TOTAL PROTEIN: 7.6 G/DL (ref 6.4–8.2)
TROPONIN: <0.01 NG/ML
URINE TYPE: ABNORMAL
UROBILINOGEN, URINE: 0.2 E.U./DL
WBC # BLD: 5.1 K/UL (ref 4–11)

## 2020-02-16 PROCEDURE — 84484 ASSAY OF TROPONIN QUANT: CPT

## 2020-02-16 PROCEDURE — 2500000003 HC RX 250 WO HCPCS: Performed by: EMERGENCY MEDICINE

## 2020-02-16 PROCEDURE — 70498 CT ANGIOGRAPHY NECK: CPT

## 2020-02-16 PROCEDURE — 96374 THER/PROPH/DIAG INJ IV PUSH: CPT

## 2020-02-16 PROCEDURE — 85025 COMPLETE CBC W/AUTO DIFF WBC: CPT

## 2020-02-16 PROCEDURE — 81003 URINALYSIS AUTO W/O SCOPE: CPT

## 2020-02-16 PROCEDURE — 2580000003 HC RX 258: Performed by: EMERGENCY MEDICINE

## 2020-02-16 PROCEDURE — 85610 PROTHROMBIN TIME: CPT

## 2020-02-16 PROCEDURE — 80053 COMPREHEN METABOLIC PANEL: CPT

## 2020-02-16 PROCEDURE — 99291 CRITICAL CARE FIRST HOUR: CPT

## 2020-02-16 PROCEDURE — 87086 URINE CULTURE/COLONY COUNT: CPT

## 2020-02-16 PROCEDURE — 93010 ELECTROCARDIOGRAM REPORT: CPT | Performed by: INTERNAL MEDICINE

## 2020-02-16 PROCEDURE — 36415 COLL VENOUS BLD VENIPUNCTURE: CPT

## 2020-02-16 PROCEDURE — 93005 ELECTROCARDIOGRAM TRACING: CPT | Performed by: EMERGENCY MEDICINE

## 2020-02-16 PROCEDURE — 6360000004 HC RX CONTRAST MEDICATION: Performed by: EMERGENCY MEDICINE

## 2020-02-16 PROCEDURE — 99285 EMERGENCY DEPT VISIT HI MDM: CPT

## 2020-02-16 PROCEDURE — 83880 ASSAY OF NATRIURETIC PEPTIDE: CPT

## 2020-02-16 PROCEDURE — 71045 X-RAY EXAM CHEST 1 VIEW: CPT

## 2020-02-16 PROCEDURE — 70450 CT HEAD/BRAIN W/O DYE: CPT

## 2020-02-16 PROCEDURE — 83735 ASSAY OF MAGNESIUM: CPT

## 2020-02-16 RX ORDER — LABETALOL HYDROCHLORIDE 5 MG/ML
10 INJECTION, SOLUTION INTRAVENOUS ONCE
Status: COMPLETED | OUTPATIENT
Start: 2020-02-16 | End: 2020-02-16

## 2020-02-16 RX ORDER — SODIUM CHLORIDE 9 MG/ML
INJECTION, SOLUTION INTRAVENOUS CONTINUOUS
Status: DISCONTINUED | OUTPATIENT
Start: 2020-02-16 | End: 2020-02-16 | Stop reason: HOSPADM

## 2020-02-16 RX ADMIN — LABETALOL HYDROCHLORIDE 10 MG: 5 INJECTION INTRAVENOUS at 18:42

## 2020-02-16 RX ADMIN — IOPAMIDOL 75 ML: 755 INJECTION, SOLUTION INTRAVENOUS at 16:08

## 2020-02-16 RX ADMIN — SODIUM CHLORIDE 125 ML/HR: 9 INJECTION, SOLUTION INTRAVENOUS at 18:41

## 2020-02-16 ASSESSMENT — ENCOUNTER SYMPTOMS
SORE THROAT: 0
PHOTOPHOBIA: 0
BACK PAIN: 0
COUGH: 0
SHORTNESS OF BREATH: 0
EYE DISCHARGE: 0
VOMITING: 0
VISUAL CHANGE: 0
SINUS PAIN: 0
WHEEZING: 0
ABDOMINAL PAIN: 0
NAUSEA: 0
ABDOMINAL DISTENTION: 0
SINUS PRESSURE: 0

## 2020-02-16 ASSESSMENT — PAIN DESCRIPTION - LOCATION
LOCATION: BACK
LOCATION: BACK

## 2020-02-16 ASSESSMENT — PAIN DESCRIPTION - PAIN TYPE: TYPE: CHRONIC PAIN

## 2020-02-16 ASSESSMENT — PAIN SCALES - GENERAL: PAINLEVEL_OUTOF10: 5

## 2020-02-16 NOTE — ED PROVIDER NOTES
swelling. Gastrointestinal: Negative for abdominal distention, abdominal pain, nausea and vomiting. Genitourinary: Negative for flank pain. Musculoskeletal: Negative for arthralgias and back pain. Neurological: Positive for focal weakness, weakness (chronic), light-headedness and numbness. Negative for dizziness, tremors, speech change, seizures, syncope, facial asymmetry, speech difficulty, headaches and loss of balance. Hematological: Negative for adenopathy. Does not bruise/bleed easily. Psychiatric/Behavioral: Negative for confusion and memory loss. All other systems reviewed and are negative. Except as noted above the remainder of the review of systems was reviewed and negative. PAST MEDICAL HISTORY     Past Medical History:   Diagnosis Date    Carotid artery occlusion 3/10    60-69% left (neg stress test 5/10)    Cerebral artery occlusion with cerebral [facility])     Cerebrovascular disease 3/10    ? TIA    Chronic back pain      Dr Hightower Belt - pain management    Gout     Hyperlipidemia     Hypertension     Routine health maintenance     Type II or unspecified type diabetes mellitus without mention of complication, not stated as uncontrolled     eye exam 5/30         SURGICAL HISTORY       Past Surgical History:   Procedure Laterality Date    CATARACT REMOVAL WITH IMPLANT Right     CYSTOSCOPY      CA REOPER, CAROTID ENDARTEC>1 MON Left 8/27/2018    LEFT CAROTID ENDARTERECTOMY performed by Kelli Roth MD at [facility]      neg prostate biopsy 1-09    SHOULDER SURGERY      JOINT CLEANING    TURP  2017         CURRENT MEDICATIONS       Previous Medications    ASPIRIN 81 MG CHEWABLE TABLET    Take 1 tablet by mouth daily    ATORVASTATIN (LIPITOR) 10 MG TABLET    Take 20 mg by mouth daily    DOCUSATE SODIUM (COLACE) 100 MG CAPSULE    Take 1 capsule by mouth daily    INSULIN GLARGINE (LANTUS) 100 UNIT/ML INJECTION    Inject 10 Units into the skin nightly. Commands (1c. ): Performs both tasks correctly  Best Gaze (2. ): Normal  Visual (3. ): No visual loss  Facial Palsy (4. ): Normal symmetrical movement  Motor Arm, Left (5a. ): Drift, but does not hit bed  Motor Arm, Right (5b. ): No drift  Motor Leg, Left (6a. ): Drift, but does not hit bed  Motor Leg, Right (6b. ): No drift  Limb Ataxia (7. ): (!) Present in one limb  Sensory (8. ): (!) Mild to Moderate  Best Language (9. ): No aphasia  Dysarthria (10. ): Normal  Extinction and Inattention (11): No abnormality  Total: 4Glasgow Coma Scale  Eye Opening: Spontaneous  Best Verbal Response: Oriented  Best Motor Response: Obeys commands  Mercedes Coma Scale Score: 15          PHYSICAL EXAM    (up to 7 for level 4, 8 or more for level 5)     ED Triage Vitals   BP Temp Temp Source Pulse Resp SpO2 Height Weight   02/16/20 1518 02/16/20 1518 02/16/20 1518 02/16/20 1518 02/16/20 1530 02/16/20 1518 02/16/20 1518 02/16/20 1518   (!) 191/113 98.3 °F (36.8 °C) Oral 88 20 98 % 5' 8\" (1.727 m) 160 lb (72.6 kg)       Physical Exam  Vitals signs and nursing note reviewed. Constitutional:       General: He is not in acute distress. Appearance: Normal appearance. He is normal weight. He is not ill-appearing, toxic-appearing or diaphoretic. HENT:      Head: Normocephalic and atraumatic. Right Ear: Tympanic membrane and ear canal normal.      Left Ear: Tympanic membrane, ear canal and external ear normal.      Nose: Nose normal.   Eyes:      General: Lids are normal.      Extraocular Movements: Extraocular movements intact. Conjunctiva/sclera: Conjunctivae normal.      Pupils: Pupils are equal, round, and reactive to light. Neck:      Musculoskeletal: Normal range of motion and neck supple. No neck rigidity or muscular tenderness. Vascular: No carotid bruit. Cardiovascular:      Rate and Rhythm: Normal rate and regular rhythm. Pulses: Normal pulses. Heart sounds: Normal heart sounds.    Pulmonary: Slight increase in the left distal vertebral artery narrowing. Multifocal significant narrowing again noted involving the left posterior   cerebral artery. There is mild additional narrowing involving the right   posterior cerebral artery, unchanged. Multifocal cavernous carotid segment narrowing, left greater than right. This is stable. No new areas of abnormal intracranial narrowing are noted otherwise. XR CHEST PORTABLE   Final Result   No radiographic evidence of acute cardiopulmonary disease. CT Head WO Contrast   Final Result   No acute intracranial abnormality. White matter hypoattenuation described is typical of microvascular ischemic   disease or as sequela of dysmyelinating/demyelinating processes.                  LABS:  Results for orders placed or performed during the hospital encounter of 02/16/20   CBC Auto Differential   Result Value Ref Range    WBC 5.1 4.0 - 11.0 K/uL    RBC 4.17 (L) 4.20 - 5.90 M/uL    Hemoglobin 13.6 13.5 - 17.5 g/dL    Hematocrit 40.0 (L) 40.5 - 52.5 %    MCV 96.1 80.0 - 100.0 fL    MCH 32.8 26.0 - 34.0 pg    MCHC 34.1 31.0 - 36.0 g/dL    RDW 13.7 12.4 - 15.4 %    Platelets 459 381 - 188 K/uL    MPV 7.8 5.0 - 10.5 fL    PLATELET SLIDE REVIEW Decreased     Neutrophils % 62.3 %    Lymphocytes % 30.5 %    Monocytes % 4.0 %    Eosinophils % 2.6 %    Basophils % 0.6 %    Neutrophils Absolute 3.2 1.7 - 7.7 K/uL    Lymphocytes Absolute 1.5 1.0 - 5.1 K/uL    Monocytes Absolute 0.2 0.0 - 1.3 K/uL    Eosinophils Absolute 0.1 0.0 - 0.6 K/uL    Basophils Absolute 0.0 0.0 - 0.2 K/uL   Comprehensive Metabolic Panel   Result Value Ref Range    Sodium 139 136 - 145 mmol/L    Potassium 5.5 (H) 3.5 - 5.1 mmol/L    Chloride 102 99 - 110 mmol/L    CO2 22 21 - 32 mmol/L    Anion Gap 15 3 - 16    Glucose 148 (H) 70 - 99 mg/dL    BUN 28 (H) 7 - 20 mg/dL    CREATININE 1.5 (H) 0.8 - 1.3 mg/dL    GFR Non- 46 (A) >60    GFR  56 (A) >60    Calcium 10.3 8.3 - 10.6 mg/dL    Total Protein 7.6 6.4 - 8.2 g/dL    Alb 4.7 3.4 - 5.0 g/dL    Albumin/Globulin Ratio 1.6 1.1 - 2.2    Total Bilirubin 0.3 0.0 - 1.0 mg/dL    Alkaline Phosphatase 140 (H) 40 - 129 U/L    ALT 16 10 - 40 U/L    AST 24 15 - 37 U/L    Globulin 2.9 g/dL   Protime-INR   Result Value Ref Range    Protime 10.8 10.0 - 13.2 sec    INR 0.93 0.86 - 1.14   Troponin   Result Value Ref Range    Troponin <0.01 <0.01 ng/mL   Brain Natriuretic Peptide   Result Value Ref Range    Pro- (H) 0 - 124 pg/mL   Urinalysis   Result Value Ref Range    Color, UA Yellow Straw/Yellow    Clarity, UA Clear Clear    Glucose, Ur >=1000 (A) Negative mg/dL    Bilirubin Urine Negative Negative    Ketones, Urine TRACE (A) Negative mg/dL    Specific Gravity, UA 1.015 1.005 - 1.030    Blood, Urine Negative Negative    pH, UA 7.5 5.0 - 8.0    Protein, UA Negative Negative mg/dL    Urobilinogen, Urine 0.2 <2.0 E.U./dL    Nitrite, Urine Negative Negative    Leukocyte Esterase, Urine Negative Negative    Microscopic Examination Not Indicated     Urine Type NotGiven    Magnesium   Result Value Ref Range    Magnesium 1.80 1.80 - 2.40 mg/dL   EKG 12 Lead   Result Value Ref Range    Ventricular Rate 72 BPM    Atrial Rate 72 BPM    P-R Interval 156 ms    QRS Duration 80 ms    Q-T Interval 396 ms    QTc Calculation (Bazett) 433 ms    P Axis 15 degrees    R Axis -29 degrees    T Axis 91 degrees    Diagnosis       Normal sinus rhythmNonspecific T wave abnormalityAbnormal ECGWhen compared with ECG of 13-Sep-2018 14:23:46Nonspecific T wave abnormality is now PresentConfirmed by [facility] Wanda DAY ([id number]) on 2/16/2020 4:46:33 PM            EMERGENCY DEPARTMENT COURSE and DIFFERENTIAL DIAGNOSIS/MDM:     Vitals:    02/16/20 1530 02/16/20 1545 02/16/20 1700 02/16/20 1804   BP: (!) 199/104 (!) 206/97 (!) 185/98 (!) 182/106   Pulse: 84 84 71 73   Resp: 20 18 17    Temp:       TempSrc:       SpO2: 96% 94% 98% 100%   Weight: Height:               MDM      REASSESSMENT      The patient did undergo the CT without which did show some old disease  A CTA showed no acute obstruction but also did show some old disease most likely from his previous hemorrhage  He is not on any anticoagulants at this point  It does appear that his numbness is objectively more marked in his left upper extremity and left lower extremity actually and it does limit his walking up. With a history of previous right basal ganglionic hemorrhage as well as the factor that his symptoms started 48 hours ago he is not a candidate for thrombolytic therapy  There is no acute bleed no acute shifts his BUN is elevated at 28 with a creatinine of 1.5 indicating some mild to moderate BRIJESH his potassium is just minimally elevated EKG does not show any acute processes patient will be admitted at the [facility] after full discussion was carried out with them patient will be given intravenous labetalol  A total of 40 minutes of critical care time (with no procedure time) was spent managing this patient who presented with strokelike symptoms  Slight hyperkalemia noted slight AK I noted and also hypertensive disease continued to be present. Patient remained stable without progression of symptoms and was discussed with Dr. Pricila Javier at the [address]     CONSULTS:  None      PROCEDURES:     Procedures    MEDICATIONS GIVEN THIS VISIT:  Medications   0.9 % sodium chloride infusion (has no administration in time range)   labetalol (NORMODYNE;TRANDATE) injection 10 mg (has no administration in time range)   iopamidol (ISOVUE-370) 76 % injection 75 mL (75 mLs Intravenous Given 2/16/20 1608)        FINAL IMPRESSION      1. Numbness    2. Essential hypertension    3. Paresthesia    4. Cerebrovascular accident (CVA), unspecified mechanism ([address]    5. Acute kidney injury ([address]    6. Cerebral hemorrhage with hemiparesis ([address]    7.  Diabetes mellitus type 2 in nonobese (HCC) DISPOSITION/PLAN   DISPOSITION Decision To Transfer 02/16/2020 06:22:25 PM      PATIENT REFERRED TO:  No follow-up provider specified. DISCHARGE MEDICATIONS:  New Prescriptions    No medications on file       Controlled Substances Monitoring  No flowsheet data found. (Please note that portions of this note were completed with a voice recognition program.  Efforts were made to edit the dictations but occasionally words are mis-transcribed.)    Patient was advised to return to the Emergency Department if there was any worsening.     Chloe Kwong MD (electronically signed)  Attending Emergency Physician           Kelly Aleman MD  02/16/20 9721

## 2020-02-16 NOTE — ED NOTES
Dr Ro Rose updated to ongoing htn. Plan - no treatment at this time. Pt in no distress.        Sonu Doherty RN  02/16/20 2146

## 2020-02-18 LAB — URINE CULTURE, ROUTINE: NORMAL

## 2020-11-03 PROBLEM — E78.5 HYPERLIPIDEMIA: Status: RESOLVED | Noted: 2020-11-03 | Resolved: 2020-11-03

## 2020-11-19 ENCOUNTER — HOSPITAL ENCOUNTER (INPATIENT)
Age: 70
LOS: 6 days | Discharge: HOME | DRG: 177 | End: 2020-11-26
Attending: FAMILY MEDICINE | Admitting: HOSPITALIST
Payer: MEDICARE

## 2020-11-19 ENCOUNTER — APPOINTMENT (OUTPATIENT)
Dept: GENERAL RADIOLOGY | Age: 70
DRG: 177 | End: 2020-11-19
Payer: MEDICARE

## 2020-11-19 PROCEDURE — 99285 EMERGENCY DEPT VISIT HI MDM: CPT

## 2020-11-19 PROCEDURE — 96367 TX/PROPH/DG ADDL SEQ IV INF: CPT

## 2020-11-19 PROCEDURE — 96365 THER/PROPH/DIAG IV INF INIT: CPT

## 2020-11-19 PROCEDURE — 71045 X-RAY EXAM CHEST 1 VIEW: CPT

## 2020-11-19 PROCEDURE — 96375 TX/PRO/DX INJ NEW DRUG ADDON: CPT

## 2020-11-19 RX ORDER — 0.9 % SODIUM CHLORIDE 0.9 %
1000 INTRAVENOUS SOLUTION INTRAVENOUS ONCE
Status: COMPLETED | OUTPATIENT
Start: 2020-11-19 | End: 2020-11-20

## 2020-11-19 RX ORDER — KETOROLAC TROMETHAMINE 30 MG/ML
15 INJECTION, SOLUTION INTRAMUSCULAR; INTRAVENOUS ONCE
Status: COMPLETED | OUTPATIENT
Start: 2020-11-19 | End: 2020-11-20

## 2020-11-20 PROBLEM — J12.82 PNEUMONIA DUE TO COVID-19 VIRUS: Status: ACTIVE | Noted: 2020-11-20

## 2020-11-20 PROBLEM — U07.1 PNEUMONIA DUE TO COVID-19 VIRUS: Status: ACTIVE | Noted: 2020-11-20

## 2020-11-20 LAB
A/G RATIO: 1.3 (ref 1.1–2.2)
ABO/RH: NORMAL
ALBUMIN SERPL-MCNC: 4.2 G/DL (ref 3.4–5)
ALP BLD-CCNC: 135 U/L (ref 40–129)
ALT SERPL-CCNC: 11 U/L (ref 10–40)
ANION GAP SERPL CALCULATED.3IONS-SCNC: 15 MMOL/L (ref 3–16)
ANTIBODY SCREEN: NORMAL
APTT: 32.4 SEC (ref 24.2–36.2)
AST SERPL-CCNC: 16 U/L (ref 15–37)
BASOPHILS ABSOLUTE: 0 K/UL (ref 0–0.2)
BASOPHILS RELATIVE PERCENT: 0.2 %
BILIRUB SERPL-MCNC: 0.6 MG/DL (ref 0–1)
BUN BLDV-MCNC: 24 MG/DL (ref 7–20)
CALCIUM SERPL-MCNC: 9.1 MG/DL (ref 8.3–10.6)
CHLORIDE BLD-SCNC: 95 MMOL/L (ref 99–110)
CO2: 23 MMOL/L (ref 21–32)
CREAT SERPL-MCNC: 1.7 MG/DL (ref 0.8–1.3)
D DIMER: 373 NG/ML DDU (ref 0–229)
EKG ATRIAL RATE: 74 BPM
EKG DIAGNOSIS: NORMAL
EKG P AXIS: -11 DEGREES
EKG P-R INTERVAL: 158 MS
EKG Q-T INTERVAL: 386 MS
EKG QRS DURATION: 88 MS
EKG QTC CALCULATION (BAZETT): 428 MS
EKG R AXIS: -23 DEGREES
EKG T AXIS: 42 DEGREES
EKG VENTRICULAR RATE: 74 BPM
EOSINOPHILS ABSOLUTE: 0 K/UL (ref 0–0.6)
EOSINOPHILS RELATIVE PERCENT: 0.1 %
GFR AFRICAN AMERICAN: 48
GFR NON-AFRICAN AMERICAN: 40
GLOBULIN: 3.2 G/DL
GLUCOSE BLD-MCNC: 165 MG/DL (ref 70–99)
GLUCOSE BLD-MCNC: 174 MG/DL (ref 70–99)
GLUCOSE BLD-MCNC: 175 MG/DL (ref 70–99)
GLUCOSE BLD-MCNC: 208 MG/DL (ref 70–99)
HCT VFR BLD CALC: 37.5 % (ref 40.5–52.5)
HEMOGLOBIN: 12.5 G/DL (ref 13.5–17.5)
INR BLD: 1.03 (ref 0.86–1.14)
LACTIC ACID: 1.5 MMOL/L (ref 0.4–2)
LYMPHOCYTES ABSOLUTE: 0.3 K/UL (ref 1–5.1)
LYMPHOCYTES RELATIVE PERCENT: 9.2 %
MCH RBC QN AUTO: 33.4 PG (ref 26–34)
MCHC RBC AUTO-ENTMCNC: 33.5 G/DL (ref 31–36)
MCV RBC AUTO: 99.8 FL (ref 80–100)
MONOCYTES ABSOLUTE: 0.1 K/UL (ref 0–1.3)
MONOCYTES RELATIVE PERCENT: 2.2 %
NEUTROPHILS ABSOLUTE: 2.7 K/UL (ref 1.7–7.7)
NEUTROPHILS RELATIVE PERCENT: 88.3 %
PDW BLD-RTO: 13.4 % (ref 12.4–15.4)
PERFORMED ON: ABNORMAL
PLATELET # BLD: 110 K/UL (ref 135–450)
PMV BLD AUTO: 7.6 FL (ref 5–10.5)
POTASSIUM REFLEX MAGNESIUM: 4.8 MMOL/L (ref 3.5–5.1)
PROCALCITONIN: 0.7 NG/ML (ref 0–0.15)
PROTHROMBIN TIME: 11.9 SEC (ref 10–13.2)
RBC # BLD: 3.76 M/UL (ref 4.2–5.9)
SARS-COV-2, NAAT: DETECTED
SODIUM BLD-SCNC: 133 MMOL/L (ref 136–145)
TOTAL PROTEIN: 7.4 G/DL (ref 6.4–8.2)
TROPONIN: <0.01 NG/ML
WBC # BLD: 3.1 K/UL (ref 4–11)

## 2020-11-20 PROCEDURE — 2580000003 HC RX 258: Performed by: INTERNAL MEDICINE

## 2020-11-20 PROCEDURE — 84484 ASSAY OF TROPONIN QUANT: CPT

## 2020-11-20 PROCEDURE — 93005 ELECTROCARDIOGRAM TRACING: CPT | Performed by: FAMILY MEDICINE

## 2020-11-20 PROCEDURE — 84145 PROCALCITONIN (PCT): CPT

## 2020-11-20 PROCEDURE — 80053 COMPREHEN METABOLIC PANEL: CPT

## 2020-11-20 PROCEDURE — 85025 COMPLETE CBC W/AUTO DIFF WBC: CPT

## 2020-11-20 PROCEDURE — 99232 SBSQ HOSP IP/OBS MODERATE 35: CPT | Performed by: INTERNAL MEDICINE

## 2020-11-20 PROCEDURE — 83605 ASSAY OF LACTIC ACID: CPT

## 2020-11-20 PROCEDURE — 2580000003 HC RX 258: Performed by: HOSPITALIST

## 2020-11-20 PROCEDURE — 1200000000 HC SEMI PRIVATE

## 2020-11-20 PROCEDURE — 86901 BLOOD TYPING SEROLOGIC RH(D): CPT

## 2020-11-20 PROCEDURE — 85730 THROMBOPLASTIN TIME PARTIAL: CPT

## 2020-11-20 PROCEDURE — 6360000002 HC RX W HCPCS: Performed by: FAMILY MEDICINE

## 2020-11-20 PROCEDURE — 85610 PROTHROMBIN TIME: CPT

## 2020-11-20 PROCEDURE — 6360000002 HC RX W HCPCS: Performed by: INTERNAL MEDICINE

## 2020-11-20 PROCEDURE — 86850 RBC ANTIBODY SCREEN: CPT

## 2020-11-20 PROCEDURE — U0002 COVID-19 LAB TEST NON-CDC: HCPCS

## 2020-11-20 PROCEDURE — 93010 ELECTROCARDIOGRAM REPORT: CPT | Performed by: INTERNAL MEDICINE

## 2020-11-20 PROCEDURE — 85379 FIBRIN DEGRADATION QUANT: CPT

## 2020-11-20 PROCEDURE — 2580000003 HC RX 258: Performed by: FAMILY MEDICINE

## 2020-11-20 PROCEDURE — 86900 BLOOD TYPING SEROLOGIC ABO: CPT

## 2020-11-20 PROCEDURE — 6370000000 HC RX 637 (ALT 250 FOR IP): Performed by: HOSPITALIST

## 2020-11-20 PROCEDURE — 6360000002 HC RX W HCPCS: Performed by: HOSPITALIST

## 2020-11-20 RX ORDER — SODIUM CHLORIDE 0.9 % (FLUSH) 0.9 %
10 SYRINGE (ML) INJECTION PRN
Status: DISCONTINUED | OUTPATIENT
Start: 2020-11-20 | End: 2020-11-26 | Stop reason: HOSPADM

## 2020-11-20 RX ORDER — SODIUM CHLORIDE 9 MG/ML
INJECTION, SOLUTION INTRAVENOUS CONTINUOUS
Status: DISCONTINUED | OUTPATIENT
Start: 2020-11-20 | End: 2020-11-21

## 2020-11-20 RX ORDER — ACETAMINOPHEN 650 MG/1
650 SUPPOSITORY RECTAL EVERY 6 HOURS PRN
Status: DISCONTINUED | OUTPATIENT
Start: 2020-11-20 | End: 2020-11-26 | Stop reason: HOSPADM

## 2020-11-20 RX ORDER — ACETAMINOPHEN 325 MG/1
650 TABLET ORAL EVERY 6 HOURS PRN
Status: DISCONTINUED | OUTPATIENT
Start: 2020-11-20 | End: 2020-11-26 | Stop reason: HOSPADM

## 2020-11-20 RX ORDER — POLYETHYLENE GLYCOL 3350 17 G/17G
17 POWDER, FOR SOLUTION ORAL DAILY PRN
Status: DISCONTINUED | OUTPATIENT
Start: 2020-11-20 | End: 2020-11-26 | Stop reason: HOSPADM

## 2020-11-20 RX ORDER — DEXTROSE MONOHYDRATE 25 G/50ML
12.5 INJECTION, SOLUTION INTRAVENOUS PRN
Status: DISCONTINUED | OUTPATIENT
Start: 2020-11-20 | End: 2020-11-26 | Stop reason: HOSPADM

## 2020-11-20 RX ORDER — ONDANSETRON 2 MG/ML
4 INJECTION INTRAMUSCULAR; INTRAVENOUS EVERY 6 HOURS PRN
Status: DISCONTINUED | OUTPATIENT
Start: 2020-11-20 | End: 2020-11-26 | Stop reason: HOSPADM

## 2020-11-20 RX ORDER — NICOTINE POLACRILEX 4 MG
15 LOZENGE BUCCAL PRN
Status: DISCONTINUED | OUTPATIENT
Start: 2020-11-20 | End: 2020-11-26 | Stop reason: HOSPADM

## 2020-11-20 RX ORDER — SODIUM CHLORIDE 0.9 % (FLUSH) 0.9 %
10 SYRINGE (ML) INJECTION EVERY 12 HOURS SCHEDULED
Status: DISCONTINUED | OUTPATIENT
Start: 2020-11-20 | End: 2020-11-26 | Stop reason: HOSPADM

## 2020-11-20 RX ORDER — DEXAMETHASONE SODIUM PHOSPHATE 10 MG/ML
6 INJECTION, SOLUTION INTRAMUSCULAR; INTRAVENOUS DAILY
Status: DISCONTINUED | OUTPATIENT
Start: 2020-11-20 | End: 2020-11-20

## 2020-11-20 RX ORDER — ATORVASTATIN CALCIUM 10 MG/1
20 TABLET, FILM COATED ORAL DAILY
Status: DISCONTINUED | OUTPATIENT
Start: 2020-11-20 | End: 2020-11-26 | Stop reason: HOSPADM

## 2020-11-20 RX ORDER — INSULIN GLARGINE 100 [IU]/ML
10 INJECTION, SOLUTION SUBCUTANEOUS NIGHTLY
Status: DISCONTINUED | OUTPATIENT
Start: 2020-11-20 | End: 2020-11-20

## 2020-11-20 RX ORDER — PREGABALIN 100 MG/1
100 CAPSULE ORAL 2 TIMES DAILY
Status: DISCONTINUED | OUTPATIENT
Start: 2020-11-20 | End: 2020-11-21

## 2020-11-20 RX ORDER — PROMETHAZINE HYDROCHLORIDE 25 MG/1
12.5 TABLET ORAL EVERY 6 HOURS PRN
Status: DISCONTINUED | OUTPATIENT
Start: 2020-11-20 | End: 2020-11-26 | Stop reason: HOSPADM

## 2020-11-20 RX ORDER — DEXAMETHASONE SODIUM PHOSPHATE 10 MG/ML
6 INJECTION, SOLUTION INTRAMUSCULAR; INTRAVENOUS EVERY 24 HOURS
Status: DISCONTINUED | OUTPATIENT
Start: 2020-11-20 | End: 2020-11-26 | Stop reason: HOSPADM

## 2020-11-20 RX ORDER — DEXTROSE MONOHYDRATE 50 MG/ML
100 INJECTION, SOLUTION INTRAVENOUS PRN
Status: DISCONTINUED | OUTPATIENT
Start: 2020-11-20 | End: 2020-11-26 | Stop reason: HOSPADM

## 2020-11-20 RX ORDER — ASPIRIN 81 MG/1
81 TABLET, CHEWABLE ORAL DAILY
Status: DISCONTINUED | OUTPATIENT
Start: 2020-11-20 | End: 2020-11-26 | Stop reason: HOSPADM

## 2020-11-20 RX ORDER — INSULIN GLARGINE 100 [IU]/ML
15 INJECTION, SOLUTION SUBCUTANEOUS NIGHTLY
Status: DISCONTINUED | OUTPATIENT
Start: 2020-11-20 | End: 2020-11-25

## 2020-11-20 RX ADMIN — ATORVASTATIN CALCIUM 20 MG: 10 TABLET, FILM COATED ORAL at 09:25

## 2020-11-20 RX ADMIN — SODIUM CHLORIDE: 9 INJECTION, SOLUTION INTRAVENOUS at 04:35

## 2020-11-20 RX ADMIN — SODIUM CHLORIDE 1000 ML: 9 INJECTION, SOLUTION INTRAVENOUS at 01:37

## 2020-11-20 RX ADMIN — ENOXAPARIN SODIUM 30 MG: 30 INJECTION SUBCUTANEOUS at 22:04

## 2020-11-20 RX ADMIN — ASPIRIN 81 MG CHEWABLE TABLET 81 MG: 81 TABLET CHEWABLE at 09:25

## 2020-11-20 RX ADMIN — INSULIN LISPRO 1 UNITS: 100 INJECTION, SOLUTION INTRAVENOUS; SUBCUTANEOUS at 09:39

## 2020-11-20 RX ADMIN — METOPROLOL TARTRATE 12.5 MG: 25 TABLET, FILM COATED ORAL at 22:05

## 2020-11-20 RX ADMIN — DEXTROSE MONOHYDRATE 500 MG: 50 INJECTION, SOLUTION INTRAVENOUS at 02:18

## 2020-11-20 RX ADMIN — DEXAMETHASONE SODIUM PHOSPHATE 6 MG: 10 INJECTION, SOLUTION INTRAMUSCULAR; INTRAVENOUS at 04:55

## 2020-11-20 RX ADMIN — CEFTRIAXONE SODIUM 1 G: 1 INJECTION, POWDER, FOR SOLUTION INTRAMUSCULAR; INTRAVENOUS at 01:38

## 2020-11-20 RX ADMIN — Medication 10 ML: at 23:29

## 2020-11-20 RX ADMIN — INSULIN GLARGINE 15 UNITS: 100 INJECTION, SOLUTION SUBCUTANEOUS at 22:08

## 2020-11-20 RX ADMIN — PREGABALIN 100 MG: 100 CAPSULE ORAL at 22:29

## 2020-11-20 RX ADMIN — SODIUM CHLORIDE: 9 INJECTION, SOLUTION INTRAVENOUS at 18:37

## 2020-11-20 RX ADMIN — PREGABALIN 100 MG: 100 CAPSULE ORAL at 09:25

## 2020-11-20 RX ADMIN — Medication 10 ML: at 09:26

## 2020-11-20 RX ADMIN — ACETAMINOPHEN 650 MG: 325 TABLET ORAL at 18:36

## 2020-11-20 RX ADMIN — INSULIN LISPRO 1 UNITS: 100 INJECTION, SOLUTION INTRAVENOUS; SUBCUTANEOUS at 22:11

## 2020-11-20 RX ADMIN — METOPROLOL TARTRATE 12.5 MG: 25 TABLET, FILM COATED ORAL at 09:25

## 2020-11-20 RX ADMIN — KETOROLAC TROMETHAMINE 15 MG: 30 INJECTION, SOLUTION INTRAMUSCULAR at 01:38

## 2020-11-20 RX ADMIN — INSULIN LISPRO 1 UNITS: 100 INJECTION, SOLUTION INTRAVENOUS; SUBCUTANEOUS at 12:14

## 2020-11-20 RX ADMIN — ENOXAPARIN SODIUM 40 MG: 40 INJECTION SUBCUTANEOUS at 09:25

## 2020-11-20 ASSESSMENT — PAIN SCALES - GENERAL
PAINLEVEL_OUTOF10: 3
PAINLEVEL_OUTOF10: 4
PAINLEVEL_OUTOF10: 2
PAINLEVEL_OUTOF10: 4

## 2020-11-20 ASSESSMENT — PAIN DESCRIPTION - PAIN TYPE: TYPE: CHRONIC PAIN

## 2020-11-20 ASSESSMENT — PAIN DESCRIPTION - LOCATION
LOCATION: BACK
LOCATION: BACK

## 2020-11-20 NOTE — PROGRESS NOTES
Patient admitted to room 229 from ER. Patient oriented to room, call light, bed rails, phone, lights and bathroom. Patient instructed about the schedule of the day including: vital sign frequency, lab draws, possible tests, frequency of MD and staff rounds, daily weights, I &O's and prescribed diet. Bed alarm deferred patient low fall risk and refuses alarm. Telemetry box in place, patient aware of placement and reason. Bed locked, in lowest position, side rails up 2/4, call light within reach. Recliner Assessment:   Patient is able to demonstrate the ability to move from a reclining position to an upright position within the recliner. ?  ?  4 Eyes Skin Assessment:   The patient is being assess for   Admission  I agree that 2 RN's have performed a thorough Head to Toe Skin Assessment on the patient. ALL assessment sites listed below have been assessed. Areas assessed by both nurses:   Head, Face, and Ears   Shoulders, Back, and Chest, Abdomen  Arms, Elbows, and Hands   Coccyx, Sacrum, and Ischium  Legs, Feet, and Heels  No skin areas noted. **SHARE this note so that the co-signing nurse is able to place an eSignature**  Co-signer eSignature: {Esignature:[id number]}  Does the Patient have Skin Breakdown? No   Jacques Prevention initiated: No   Wound Care Orders initiated: No  [facility] nurse consulted for Pressure Injury (Stage 3,4, Unstageable, DTI, NWPT, Complex wounds)and New or Established Ostomies: No   Primary Nurse eSignature: Electronically signed by Andres Bruner RN on 11/20/20 at 3:13 PM EST  ?

## 2020-11-20 NOTE — ED NOTES
Report called to Eileen Caceres 1937 on [address], [address] [facility]  11/20/20 3088

## 2020-11-20 NOTE — ED PROVIDER NOTES
Triage Chief Complaint:   Fatigue (cough and fever. wife positive for COVID)    [place]:  Chuck Gudino is a 65 y.o. male that presents fevers, severe fatigue, severe muscle aches, pulse ox desatted down to 85% at home, and multiple Covid exposures at home. No nausea vomiting. No chest pain. Positive runny nose. No ear pain or fullness. No voice change. No lower extremity edema or calf pain. ROS:  General: Patient with fevers chills and generalized weakness  Eyes:  No recent vison changes, no discharge  ENT: Sore throat congestion no hearing changes  Cardiovascular:  No chest pain, no palpitations  Respiratory: Short of breath coughing and wheezing  Gastrointestinal:  No pain, no nausea, no vomiting, no diarrhea  Musculoskeletal:  No muscle pain, no joint pain  Skin:  No rash, no pruritis, no easy bruising  Neurologic:  No speech problems, no headache, no extremity numbness, no extremity tingling, no extremity weakness  Psychiatric:  No anxiety, no hallucinations or delusions, no suicidal or homicidal ideation  Genitourinary:  No dysuria, no hematuria  Endocrine:  No unexpected weight gain, no unexpected weight loss  Extremities:  no edema, no pain    Past Medical History:   Diagnosis Date    Carotid artery occlusion 3/10    60-69% left (neg stress test 5/10)    Cerebral artery occlusion with cerebral infarction ([address]     Cerebrovascular disease 3/10    ? TIA    Chronic back pain      Dr Fernández Officer - pain management    Gout     Hyperlipidemia     Hypertension     Routine health maintenance     Type II or unspecified type diabetes mellitus without mention of complication, not stated as uncontrolled     eye exam 5/30     Past Surgical History:   Procedure Laterality Date    CATARACT REMOVAL WITH IMPLANT Right     CYSTOSCOPY      MD REOPER, CAROTID ENDARTEC>1 MON Left 8/27/2018    LEFT CAROTID ENDARTERECTOMY performed by Kieran Bernard MD at [facility]      neg prostate biopsy 1-09    10 Units into the skin nightly. (Patient taking differently: Inject 20 Units into the skin 2 times daily ) 3 mL 12    pregabalin (LYRICA) 100 MG capsule Take 100 mg by mouth 2 times daily        Allergies   Allergen Reactions    Lisinopril      \"Makes me feel goofy\"       Nursing Notes Reviewed    Physical Exam:  ED Triage Vitals [11/20/20 0046]   Enc Vitals Group      BP (!) 115/50      Pulse 73      Resp 16      Temp 98.4 °F (36.9 °C)      Temp Source Oral      SpO2 94 %      Weight 170 lb (77.1 kg)      Height 5' 8\" (1.727 m)      Head Circumference       Peak Flow       Pain Score       Pain Loc       Pain Edu? Excl. in [address]? GENERAL APPEARANCE: Awake and alert. Cooperative. No acute distress. HEAD: Normocephalic. Atraumatic. EYES: EOM's grossly intact. Sclera anicteric. ENT: Mucous membranes are moist. Tolerates saliva. No trismus. NECK: Supple. No meningismus. Trachea midline. HEART: RRR. Radial pulses 2+. LUNGS: Respirations unlabored. Rhonchi bilaterally. No wheezing. Pulse ox 90 to 94%. ABDOMEN: Soft. Non-tender. No guarding or rebound. EXTREMITIES: No acute deformities. No edema. SKIN: Warm and dry. NEUROLOGICAL: No gross facial drooping. Moves all 4 extremities spontaneously. PSYCHIATRIC: Normal mood.     I have reviewed and interpreted all of the currently available lab results from this visit (if applicable):  Results for orders placed or performed during the hospital encounter of 11/19/20   CBC Auto Differential   Result Value Ref Range    WBC 3.1 (L) 4.0 - 11.0 K/uL    RBC 3.76 (L) 4.20 - 5.90 M/uL    Hemoglobin 12.5 (L) 13.5 - 17.5 g/dL    Hematocrit 37.5 (L) 40.5 - 52.5 %    MCV 99.8 80.0 - 100.0 fL    MCH 33.4 26.0 - 34.0 pg    MCHC 33.5 31.0 - 36.0 g/dL    RDW 13.4 12.4 - 15.4 %    Platelets 076 (L) 935 - 450 K/uL    MPV 7.6 5.0 - 10.5 fL    Neutrophils % 88.3 %    Lymphocytes % 9.2 %    Monocytes % 2.2 %    Eosinophils % 0.1 %    Basophils % 0.2 %    Neutrophils Absolute 2.7 1.7 - 7.7 K/uL    Lymphocytes Absolute 0.3 (L) 1.0 - 5.1 K/uL    Monocytes Absolute 0.1 0.0 - 1.3 K/uL    Eosinophils Absolute 0.0 0.0 - 0.6 K/uL    Basophils Absolute 0.0 0.0 - 0.2 K/uL   Comprehensive Metabolic Panel w/ Reflex to MG   Result Value Ref Range    Sodium 133 (L) 136 - 145 mmol/L    Potassium reflex Magnesium 4.8 3.5 - 5.1 mmol/L    Chloride 95 (L) 99 - 110 mmol/L    CO2 23 21 - 32 mmol/L    Anion Gap 15 3 - 16    Glucose 208 (H) 70 - 99 mg/dL    BUN 24 (H) 7 - 20 mg/dL    CREATININE 1.7 (H) 0.8 - 1.3 mg/dL    GFR Non- 40 (A) >60    GFR  48 (A) >60    Calcium 9.1 8.3 - 10.6 mg/dL    Total Protein 7.4 6.4 - 8.2 g/dL    Alb 4.2 3.4 - 5.0 g/dL    Albumin/Globulin Ratio 1.3 1.1 - 2.2    Total Bilirubin 0.6 0.0 - 1.0 mg/dL    Alkaline Phosphatase 135 (H) 40 - 129 U/L    ALT 11 10 - 40 U/L    AST 16 15 - 37 U/L    Globulin 3.2 g/dL   Protime-INR   Result Value Ref Range    Protime 11.9 10.0 - 13.2 sec    INR 1.03 0.86 - 1.14   APTT   Result Value Ref Range    aPTT 32.4 24.2 - 36.2 sec   Procalcitonin   Result Value Ref Range    Procalcitonin 0.70 (H) 0.00 - 0.15 ng/mL   D-Dimer, Quantitative   Result Value Ref Range    D-Dimer, Quant 373 (H) 0 - 229 ng/mL DDU   Troponin   Result Value Ref Range    Troponin <0.01 <0.01 ng/mL   Lactic Acid, Plasma   Result Value Ref Range    Lactic Acid 1.5 0.4 - 2.0 mmol/L   COVID-19   Result Value Ref Range    SARS-CoV-2, NAAT DETECTED (AA) Not Detected   TYPE AND SCREEN   Result Value Ref Range    ABO/Rh B POS     Antibody Screen NEG       Radiographs (if obtained):  [] The following radiograph was interpreted by myself in the absence of a radiologist:   [x] Radiologist's Report Reviewed:  XR CHEST PORTABLE   Final Result   Poor delineation of the left hemidiaphragm suggesting left basilar airspace   disease and/or a small left pleural effusion.                 EKG (if obtained): (All EKG's are interpreted by myself in the absence of a cardiologist) EKG with normal sinus rhythm. Rate 74. Normal axis. . QRS 88. QTc 428. Normal R wave progression. No acute ST elevation or depression. No flipped T wave. No ST elevation MI. No A. fib or a flutter. Chart review shows recent radiographs:  Xr Chest Portable    Result Date: 11/20/2020  EXAMINATION: ONE XRAY VIEW OF THE CHEST 11/20/2020 12:01 am COMPARISON: February 16, 2020 HISTORY: ORDERING SYSTEM PROVIDED HISTORY: covid? TECHNOLOGIST PROVIDED HISTORY: Reason for exam:->covid? Reason for Exam: sob weakness Acuity: Acute Type of Exam: Initial Shortness of breath FINDINGS: Cardiac silhouette exaggerated by low inspiratory volume and AP technique. Poor delineation of the left hemidiaphragm. Lungs are otherwise clear. No pneumothorax or subdiaphragmatic free air. No acute osseous abnormality identified. Poor delineation of the left hemidiaphragm suggesting left basilar airspace disease and/or a small left pleural effusion. MDM:  68-year-old male with history and physical as above with oxygen desaturations at home    X-ray with question of left basilar airspace disease. Rocephin and azithromycin given. Lactic 1.5. Patient with decreased white blood cell count concerning for infection as well. Procalcitonin also elevated. EKG and troponin are negative for evidence of acute ischemia    Patient with worsening chronic kidney disease with creatinine increased 1.5-1.7. Treated with 1 L fluid bolus. Glucose 208. No diabetic ketoacidosis. LFTs normal.  Remainder of electrolytes normal.    Per [facility] Covid risk of decompensation with 24 hours patient gets 4 points for age, 6 points for dyspnea on exertion, 4 points for symptoms less than 5 days, 1 point for being a male, having cardiac disease, having previous stroke, and having chronic renal insufficiency.   This puts his total points at 18 a score greater than 12 puts him at high risk and admission is appropriate. Clinical Impression:  1. Generalized weakness    2. Acute respiratory failure with hypoxia (HCC)    3. BRIJESH (acute kidney injury) ([facility] [address]    4. COVID-19      Disposition referral (if applicable):  No follow-up provider specified. Disposition medications (if applicable):  New Prescriptions    No medications on file       Comment: Please note this report has been produced using speech recognition software and may contain errors related to that system including errors in grammar, punctuation, and spelling, as well as words and phrases that may be inappropriate. If there are any questions or concerns please feel free to contact the dictating provider for clarification.       Laquita Chatterjee MD  11/20/20 4203

## 2020-11-20 NOTE — ED NOTES
[facility] to hospitalist      Rita Braun  11/20/20 2810 [id number] Dr Tyrel Kaiser called and spoke to Dr Iliana Huerta, consult completed     Rita Braun  11/20/20 9481

## 2020-11-20 NOTE — H&P
sodium (COLACE) 100 MG capsule Take 1 capsule by mouth daily 5/12/18   Ernie Pack MD   atorvastatin (LIPITOR) 10 MG tablet Take 20 mg by mouth daily    Historical Provider, MD   metoprolol tartrate (LOPRESSOR) 25 MG tablet Take 12.5 mg by mouth 2 times daily    Historical Provider, MD   insulin glargine (LANTUS) 100 UNIT/ML injection Inject 10 Units into the skin nightly. Patient taking differently: Inject 20 Units into the skin 2 times daily  6/1/10 8/27/18  Laura Banda MD   pregabalin (LYRICA) 100 MG capsule Take 100 mg by mouth 2 times daily  3/18/10   Historical Provider, MD       Allergies:  Lisinopril    Social History:  The patient currently lives at home     TOBACCO:   reports that he has never smoked. He has never used smokeless tobacco.  ETOH:   reports no history of alcohol use. Family History:   Positive as follows:    History reviewed. No pertinent family history. REVIEW OF SYSTEMS:       Constitutional: Negative for fever   HENT: Negative for sore throat   Eyes: Negative for redness   Respiratory: +ve dyspnea, cough   Cardiovascular: Negative for chest pain   Gastrointestinal: Negative for vomiting, diarrhea   Genitourinary: Negative for hematuria   Musculoskeletal: Negative for arthralgias   Skin: Negative for rash   Neurological: Negative for syncope   Hematological: Negative for adenopathy   Psychiatric/Behavorial: Negative for anxiety    PHYSICAL EXAM:    BP (!) 153/83   Pulse 72   Temp 97.4 °F (36.3 °C) (Oral)   Resp 16   Ht 5' 8\" (1.727 m)   Wt 170 lb (77.1 kg)   SpO2 96%   BMI 25.85 kg/m²         General: elderly male, healthy appearing   Awake, alert and oriented.  Appears to be not in any distress  Mucous Membranes:  Pink , anicteric  Neck: No JVD, no carotid bruit, no thyromegaly  Chest:  Clear to auscultation bilaterally, no added sounds  Cardiovascular:  RRR S1S2 heard, no murmurs or gallops  Abdomen:  Soft, undistended, non tender, no organomegaly, BS present  Extremities: No edema or cyanosis. Distal pulses well felt  Neurological : grossly normal      CBC:   Recent Labs     11/20/20 0042   WBC 3.1*   HGB 12.5*   HCT 37.5*   MCV 99.8   *     BMP:   Recent Labs     11/20/20 0042   *   K 4.8   CL 95*   CO2 23   BUN 24*   CREATININE 1.7*     LIVER PROFILE:   Recent Labs     11/20/20 0042   AST 16   ALT 11   BILITOT 0.6   ALKPHOS 135*     PT/INR:   Recent Labs     11/20/20 0042   PROTIME 11.9   INR 1.03     APTT:   Recent Labs     11/20/20 0042   APTT 32.4        CARDIAC ENZYMES  Recent Labs     11/20/20 0042   TROPONINI <0.01       U/A:    Lab Results   Component Value Date    NITRITE nt 05/11/2010    COLORU Yellow 02/16/2020    WBCUA 0-2 05/22/2018    RBCUA 0-2 05/22/2018    CLARITYU Clear 02/16/2020    SPECGRAV 1.015 02/16/2020    LEUKOCYTESUR Negative 02/16/2020    BLOODU Negative 02/16/2020    GLUCOSEU >=1000 02/16/2020       ABG  No results found for: RUQ4DEJ, BEART, M8GDOVQG, PHART, THGBART, PEC0WFC, PO2ART, WJG5JHF    CULTURES  None         RADIOLOGY  XR CHEST PORTABLE   Final Result   Poor delineation of the left hemidiaphragm suggesting left basilar airspace   disease and/or a small left pleural effusion. ASSESSMENT/PLAN:    #COVID 19 infection   - with hypoxia noted at home, currently on RA since admission   - fevers resolved   - check procal. Rocephin and azithromycin   -continue steroids   - mild leucopenia , monitor  - hold off Remdesivir and CCP      #CKD 3 - f/w at [place]   - Crt 1.5 in 2/2020, 1.7 on admit. Add IVF today    #Hyponatremia  - 133 , monitor with IVF    #Pancytopenia  - likely viral infection .  monitor    #DM2  - Lantus + SSI    #history of right basal ganglia bleed  - with left sided weakness at baseline     #Left carotid stenosis  - ASA, statin     #HTN  - cont lopressor    DVT Prophylaxis: Lovenox   Diet: DIET CARB CONTROL;  Code Status: Full Code      Nikkie Long MD 11/20/2020 1:06 PM

## 2020-11-21 LAB
ANION GAP SERPL CALCULATED.3IONS-SCNC: 10 MMOL/L (ref 3–16)
BASOPHILS ABSOLUTE: 0 K/UL (ref 0–0.2)
BASOPHILS RELATIVE PERCENT: 0.2 %
BUN BLDV-MCNC: 28 MG/DL (ref 7–20)
CALCIUM SERPL-MCNC: 8.5 MG/DL (ref 8.3–10.6)
CHLORIDE BLD-SCNC: 107 MMOL/L (ref 99–110)
CO2: 20 MMOL/L (ref 21–32)
CREAT SERPL-MCNC: 1.5 MG/DL (ref 0.8–1.3)
EOSINOPHILS ABSOLUTE: 0 K/UL (ref 0–0.6)
EOSINOPHILS RELATIVE PERCENT: 0 %
GFR AFRICAN AMERICAN: 56
GFR NON-AFRICAN AMERICAN: 46
GLUCOSE BLD-MCNC: 106 MG/DL (ref 70–99)
GLUCOSE BLD-MCNC: 173 MG/DL (ref 70–99)
GLUCOSE BLD-MCNC: 209 MG/DL (ref 70–99)
GLUCOSE BLD-MCNC: 235 MG/DL (ref 70–99)
GLUCOSE BLD-MCNC: 96 MG/DL (ref 70–99)
HCT VFR BLD CALC: 31.2 % (ref 40.5–52.5)
HCT VFR BLD CALC: 35.8 % (ref 40.5–52.5)
HEMOGLOBIN: 10.6 G/DL (ref 13.5–17.5)
HEMOGLOBIN: 12.2 G/DL (ref 13.5–17.5)
LYMPHOCYTES ABSOLUTE: 0.4 K/UL (ref 1–5.1)
LYMPHOCYTES RELATIVE PERCENT: 12.1 %
MCH RBC QN AUTO: 33.2 PG (ref 26–34)
MCH RBC QN AUTO: 33.6 PG (ref 26–34)
MCHC RBC AUTO-ENTMCNC: 33.8 G/DL (ref 31–36)
MCHC RBC AUTO-ENTMCNC: 34 G/DL (ref 31–36)
MCV RBC AUTO: 97.6 FL (ref 80–100)
MCV RBC AUTO: 99.2 FL (ref 80–100)
MONOCYTES ABSOLUTE: 0.1 K/UL (ref 0–1.3)
MONOCYTES RELATIVE PERCENT: 1.8 %
NEUTROPHILS ABSOLUTE: 2.8 K/UL (ref 1.7–7.7)
NEUTROPHILS RELATIVE PERCENT: 85.9 %
PDW BLD-RTO: 12.9 % (ref 12.4–15.4)
PDW BLD-RTO: 13.3 % (ref 12.4–15.4)
PERFORMED ON: ABNORMAL
PLATELET # BLD: 107 K/UL (ref 135–450)
PLATELET # BLD: 111 K/UL (ref 135–450)
PMV BLD AUTO: 7.5 FL (ref 5–10.5)
PMV BLD AUTO: 7.9 FL (ref 5–10.5)
POTASSIUM REFLEX MAGNESIUM: 4 MMOL/L (ref 3.5–5.1)
RBC # BLD: 3.15 M/UL (ref 4.2–5.9)
RBC # BLD: 3.67 M/UL (ref 4.2–5.9)
SODIUM BLD-SCNC: 137 MMOL/L (ref 136–145)
WBC # BLD: 3.2 K/UL (ref 4–11)
WBC # BLD: 3.7 K/UL (ref 4–11)

## 2020-11-21 PROCEDURE — 1200000000 HC SEMI PRIVATE

## 2020-11-21 PROCEDURE — 2580000003 HC RX 258: Performed by: HOSPITALIST

## 2020-11-21 PROCEDURE — 82306 VITAMIN D 25 HYDROXY: CPT

## 2020-11-21 PROCEDURE — 6370000000 HC RX 637 (ALT 250 FOR IP): Performed by: HOSPITALIST

## 2020-11-21 PROCEDURE — 6360000002 HC RX W HCPCS: Performed by: HOSPITALIST

## 2020-11-21 PROCEDURE — 83550 IRON BINDING TEST: CPT

## 2020-11-21 PROCEDURE — 36415 COLL VENOUS BLD VENIPUNCTURE: CPT

## 2020-11-21 PROCEDURE — 6360000002 HC RX W HCPCS: Performed by: FAMILY MEDICINE

## 2020-11-21 PROCEDURE — 80048 BASIC METABOLIC PNL TOTAL CA: CPT

## 2020-11-21 PROCEDURE — 83540 ASSAY OF IRON: CPT

## 2020-11-21 PROCEDURE — 86140 C-REACTIVE PROTEIN: CPT

## 2020-11-21 PROCEDURE — 85025 COMPLETE CBC W/AUTO DIFF WBC: CPT

## 2020-11-21 PROCEDURE — 2580000003 HC RX 258: Performed by: INTERNAL MEDICINE

## 2020-11-21 PROCEDURE — 82746 ASSAY OF FOLIC ACID SERUM: CPT

## 2020-11-21 PROCEDURE — 82607 VITAMIN B-12: CPT

## 2020-11-21 PROCEDURE — 85027 COMPLETE CBC AUTOMATED: CPT

## 2020-11-21 PROCEDURE — 6360000002 HC RX W HCPCS: Performed by: INTERNAL MEDICINE

## 2020-11-21 RX ORDER — TRAMADOL HYDROCHLORIDE 50 MG/1
50 TABLET ORAL 3 TIMES DAILY PRN
Status: DISCONTINUED | OUTPATIENT
Start: 2020-11-21 | End: 2020-11-26 | Stop reason: HOSPADM

## 2020-11-21 RX ORDER — TRAMADOL HYDROCHLORIDE 50 MG/1
100 TABLET ORAL 3 TIMES DAILY PRN
Status: DISCONTINUED | OUTPATIENT
Start: 2020-11-21 | End: 2020-11-26 | Stop reason: HOSPADM

## 2020-11-21 RX ORDER — PREGABALIN 100 MG/1
200 CAPSULE ORAL 2 TIMES DAILY
Status: DISCONTINUED | OUTPATIENT
Start: 2020-11-21 | End: 2020-11-26 | Stop reason: HOSPADM

## 2020-11-21 RX ORDER — TRAMADOL HYDROCHLORIDE 50 MG/1
50 TABLET ORAL EVERY 8 HOURS PRN
COMMUNITY

## 2020-11-21 RX ADMIN — SODIUM CHLORIDE: 9 INJECTION, SOLUTION INTRAVENOUS at 08:22

## 2020-11-21 RX ADMIN — METOPROLOL TARTRATE 12.5 MG: 25 TABLET, FILM COATED ORAL at 22:04

## 2020-11-21 RX ADMIN — ASPIRIN 81 MG CHEWABLE TABLET 81 MG: 81 TABLET CHEWABLE at 08:24

## 2020-11-21 RX ADMIN — Medication 10 ML: at 22:00

## 2020-11-21 RX ADMIN — TRAMADOL HYDROCHLORIDE 100 MG: 50 TABLET, FILM COATED ORAL at 18:47

## 2020-11-21 RX ADMIN — ENOXAPARIN SODIUM 30 MG: 30 INJECTION SUBCUTANEOUS at 22:05

## 2020-11-21 RX ADMIN — PREGABALIN 100 MG: 100 CAPSULE ORAL at 08:24

## 2020-11-21 RX ADMIN — INSULIN LISPRO 1 UNITS: 100 INJECTION, SOLUTION INTRAVENOUS; SUBCUTANEOUS at 22:04

## 2020-11-21 RX ADMIN — DEXTROSE MONOHYDRATE 500 MG: 50 INJECTION, SOLUTION INTRAVENOUS at 08:19

## 2020-11-21 RX ADMIN — Medication 10 ML: at 08:24

## 2020-11-21 RX ADMIN — ATORVASTATIN CALCIUM 20 MG: 10 TABLET, FILM COATED ORAL at 08:24

## 2020-11-21 RX ADMIN — INSULIN LISPRO 2 UNITS: 100 INJECTION, SOLUTION INTRAVENOUS; SUBCUTANEOUS at 13:00

## 2020-11-21 RX ADMIN — ENOXAPARIN SODIUM 30 MG: 30 INJECTION SUBCUTANEOUS at 08:25

## 2020-11-21 RX ADMIN — CEFTRIAXONE SODIUM 1 G: 1 INJECTION, POWDER, FOR SOLUTION INTRAMUSCULAR; INTRAVENOUS at 04:07

## 2020-11-21 RX ADMIN — DEXAMETHASONE SODIUM PHOSPHATE 6 MG: 10 INJECTION, SOLUTION INTRAMUSCULAR; INTRAVENOUS at 08:19

## 2020-11-21 RX ADMIN — ACETAMINOPHEN 650 MG: 325 TABLET ORAL at 04:07

## 2020-11-21 RX ADMIN — PREGABALIN 200 MG: 100 CAPSULE ORAL at 22:04

## 2020-11-21 RX ADMIN — INSULIN LISPRO 2 UNITS: 100 INJECTION, SOLUTION INTRAVENOUS; SUBCUTANEOUS at 18:25

## 2020-11-21 ASSESSMENT — PAIN DESCRIPTION - DESCRIPTORS: DESCRIPTORS: ACHING

## 2020-11-21 ASSESSMENT — PAIN - FUNCTIONAL ASSESSMENT: PAIN_FUNCTIONAL_ASSESSMENT: PREVENTS OR INTERFERES SOME ACTIVE ACTIVITIES AND ADLS

## 2020-11-21 ASSESSMENT — PAIN SCALES - GENERAL
PAINLEVEL_OUTOF10: 4
PAINLEVEL_OUTOF10: 7

## 2020-11-21 ASSESSMENT — PAIN DESCRIPTION - FREQUENCY: FREQUENCY: CONTINUOUS

## 2020-11-21 ASSESSMENT — PAIN DESCRIPTION - PAIN TYPE: TYPE: CHRONIC PAIN

## 2020-11-21 ASSESSMENT — PAIN DESCRIPTION - PROGRESSION: CLINICAL_PROGRESSION: GRADUALLY WORSENING

## 2020-11-21 ASSESSMENT — PAIN DESCRIPTION - ONSET: ONSET: ON-GOING

## 2020-11-21 ASSESSMENT — PAIN DESCRIPTION - LOCATION: LOCATION: GENERALIZED

## 2020-11-21 NOTE — PROGRESS NOTES
Spoke to patient's wife Alexi Francisco per patient's request to get information regarding his dosage of tramadol that he reports he takes. She reported to me that patient takes 50 mg of tramadol 1 or 2 tablets three times a day for pain as needed. Patient also asked about his lyrica dose, he said he wasn't sure of it. Patient's wife states that he takes 200 mg of Lyrica twice daily.

## 2020-11-21 NOTE — PROGRESS NOTES
Hospitalist Progress Note      PCP: Jared Lee    Date of Admission: 11/19/2020    Hospital Course: The patient is a 65 y.o. male with DM2, history of right basal ganglia bleed, left carotid stenosis, HTN,with complaint of fatigue, cough, fever and hypoxia ,  covid +    Subjective: in bed feels ok  Few lit o2   Medications:  Reviewed    Infusion Medications    dextrose       Scheduled Medications    azithromycin  500 mg Intravenous Q24H    cefTRIAXone (ROCEPHIN) IV  1 g Intravenous Q24H    aspirin  81 mg Oral Daily    atorvastatin  20 mg Oral Daily    metoprolol tartrate  12.5 mg Oral BID    pregabalin  100 mg Oral BID    insulin lispro  0-6 Units Subcutaneous TID WC    insulin lispro  0-3 Units Subcutaneous Nightly    sodium chloride flush  10 mL Intravenous 2 times per day    dexamethasone  6 mg Intravenous Q24H    enoxaparin  30 mg Subcutaneous BID    insulin glargine  15 Units Subcutaneous Nightly     PRN Meds: glucose, dextrose, glucagon (rDNA), dextrose, sodium chloride flush, acetaminophen **OR** acetaminophen, polyethylene glycol, promethazine **OR** ondansetron      Intake/Output Summary (Last 24 hours) at 11/21/2020 1529  Last data filed at 11/21/2020 1322  Gross per 24 hour   Intake 480 ml   Output --   Net 480 ml       Physical Exam Performed:    BP (!) 98/45   Pulse 65   Temp 97.9 °F (36.6 °C) (Oral)   Resp 18   Ht 5' 8\" (1.727 m)   Wt 170 lb (77.1 kg)   SpO2 95%   BMI 25.85 kg/m²     General appearance: No apparent distress, appears stated age and cooperative. HEENT: Pupils equal, round, and reactive to light. Marnell Filippo Respiratory:  Normal respiratory effort. Clear to auscultation, bilaterally without Rales/Wheezes/Rhonchi. Cardiovascular: Regular rate and rhythm with normal S1/S2 without murmurs, rubs or gallops. Abdomen: Soft, non-tender, non-distended with normal bowel sounds. Musculoskeletal: No clubbing, cyanosis or edema bilaterally.   Full range of motion without

## 2020-11-21 NOTE — PROGRESS NOTES
11/21/20 0745   Vital Signs   Temp 97.9 °F (36.6 °C)   Temp Source Oral   Pulse 65   Heart Rate Source Monitor   Resp 18   BP (!) 98/45   BP Location Right upper arm   BP Upper/Lower Upper   Patient Position Lying left side   Level of Consciousness 0   MEWS Score 2   Oxygen Therapy   SpO2 95 %   O2 Device Nasal cannula   O2 Flow Rate (L/min) 3 L/min     Successful placement of peripheral IV after six attempts by writer due to Clinical being unavailable for an attempt. Overdue decadron from nightshift given and patient's azithromycin was given as well. Patient in stable condition, still requiring 3L of oxygen. Patient denies other needs. Call light within reach, bed alarm on.

## 2020-11-22 LAB
BASOPHILS ABSOLUTE: 0 K/UL (ref 0–0.2)
BASOPHILS RELATIVE PERCENT: 0.1 %
C-REACTIVE PROTEIN: 252.8 MG/L (ref 0–5.1)
EOSINOPHILS ABSOLUTE: 0 K/UL (ref 0–0.6)
EOSINOPHILS RELATIVE PERCENT: 0 %
FOLATE: 17.04 NG/ML (ref 4.78–24.2)
GLUCOSE BLD-MCNC: 135 MG/DL (ref 70–99)
GLUCOSE BLD-MCNC: 142 MG/DL (ref 70–99)
GLUCOSE BLD-MCNC: 152 MG/DL (ref 70–99)
GLUCOSE BLD-MCNC: 267 MG/DL (ref 70–99)
HCT VFR BLD CALC: 32.5 % (ref 40.5–52.5)
HEMOGLOBIN: 11.1 G/DL (ref 13.5–17.5)
IRON SATURATION: 9 % (ref 20–50)
IRON: 24 UG/DL (ref 59–158)
LYMPHOCYTES ABSOLUTE: 0.2 K/UL (ref 1–5.1)
LYMPHOCYTES RELATIVE PERCENT: 6.5 %
MCH RBC QN AUTO: 33.2 PG (ref 26–34)
MCHC RBC AUTO-ENTMCNC: 34.1 G/DL (ref 31–36)
MCV RBC AUTO: 97.2 FL (ref 80–100)
MONOCYTES ABSOLUTE: 0.1 K/UL (ref 0–1.3)
MONOCYTES RELATIVE PERCENT: 1.9 %
NEUTROPHILS ABSOLUTE: 2.9 K/UL (ref 1.7–7.7)
NEUTROPHILS RELATIVE PERCENT: 91.5 %
PDW BLD-RTO: 13.3 % (ref 12.4–15.4)
PERFORMED ON: ABNORMAL
PLATELET # BLD: 113 K/UL (ref 135–450)
PMV BLD AUTO: 7.5 FL (ref 5–10.5)
RBC # BLD: 3.34 M/UL (ref 4.2–5.9)
TOTAL IRON BINDING CAPACITY: 281 UG/DL (ref 260–445)
VITAMIN B-12: 339 PG/ML (ref 211–911)
VITAMIN D 25-HYDROXY: 27.1 NG/ML
WBC # BLD: 3.2 K/UL (ref 4–11)

## 2020-11-22 PROCEDURE — 6360000002 HC RX W HCPCS: Performed by: FAMILY MEDICINE

## 2020-11-22 PROCEDURE — 2580000003 HC RX 258: Performed by: INTERNAL MEDICINE

## 2020-11-22 PROCEDURE — 6370000000 HC RX 637 (ALT 250 FOR IP): Performed by: HOSPITALIST

## 2020-11-22 PROCEDURE — 6360000002 HC RX W HCPCS: Performed by: HOSPITALIST

## 2020-11-22 PROCEDURE — XW033E5 INTRODUCTION OF REMDESIVIR ANTI-INFECTIVE INTO PERIPHERAL VEIN, PERCUTANEOUS APPROACH, NEW TECHNOLOGY GROUP 5: ICD-10-PCS | Performed by: INTERNAL MEDICINE

## 2020-11-22 PROCEDURE — 6360000002 HC RX W HCPCS: Performed by: INTERNAL MEDICINE

## 2020-11-22 PROCEDURE — 2500000003 HC RX 250 WO HCPCS: Performed by: INTERNAL MEDICINE

## 2020-11-22 PROCEDURE — 1200000000 HC SEMI PRIVATE

## 2020-11-22 PROCEDURE — 6370000000 HC RX 637 (ALT 250 FOR IP): Performed by: INTERNAL MEDICINE

## 2020-11-22 PROCEDURE — 85025 COMPLETE CBC W/AUTO DIFF WBC: CPT

## 2020-11-22 PROCEDURE — 2700000000 HC OXYGEN THERAPY PER DAY

## 2020-11-22 PROCEDURE — 2580000003 HC RX 258: Performed by: HOSPITALIST

## 2020-11-22 PROCEDURE — 99223 1ST HOSP IP/OBS HIGH 75: CPT | Performed by: INTERNAL MEDICINE

## 2020-11-22 PROCEDURE — 36415 COLL VENOUS BLD VENIPUNCTURE: CPT

## 2020-11-22 PROCEDURE — 94761 N-INVAS EAR/PLS OXIMETRY MLT: CPT

## 2020-11-22 RX ORDER — VITAMIN B COMPLEX
1000 TABLET ORAL DAILY
Status: DISCONTINUED | OUTPATIENT
Start: 2020-11-22 | End: 2020-11-26 | Stop reason: HOSPADM

## 2020-11-22 RX ORDER — QUETIAPINE FUMARATE 25 MG/1
25 TABLET, FILM COATED ORAL ONCE
Status: COMPLETED | OUTPATIENT
Start: 2020-11-22 | End: 2020-11-22

## 2020-11-22 RX ADMIN — REMDESIVIR 200 MG: 100 INJECTION, POWDER, LYOPHILIZED, FOR SOLUTION INTRAVENOUS at 15:27

## 2020-11-22 RX ADMIN — Medication 10 ML: at 08:36

## 2020-11-22 RX ADMIN — DEXTROSE MONOHYDRATE 500 MG: 50 INJECTION, SOLUTION INTRAVENOUS at 08:37

## 2020-11-22 RX ADMIN — INSULIN LISPRO 2 UNITS: 100 INJECTION, SOLUTION INTRAVENOUS; SUBCUTANEOUS at 21:57

## 2020-11-22 RX ADMIN — ENOXAPARIN SODIUM 30 MG: 30 INJECTION SUBCUTANEOUS at 08:36

## 2020-11-22 RX ADMIN — METOPROLOL TARTRATE 12.5 MG: 25 TABLET, FILM COATED ORAL at 08:35

## 2020-11-22 RX ADMIN — CEFTRIAXONE SODIUM 1 G: 1 INJECTION, POWDER, FOR SOLUTION INTRAMUSCULAR; INTRAVENOUS at 09:26

## 2020-11-22 RX ADMIN — INSULIN LISPRO 1 UNITS: 100 INJECTION, SOLUTION INTRAVENOUS; SUBCUTANEOUS at 08:43

## 2020-11-22 RX ADMIN — TRAMADOL HYDROCHLORIDE 100 MG: 50 TABLET, FILM COATED ORAL at 15:27

## 2020-11-22 RX ADMIN — INSULIN GLARGINE 15 UNITS: 100 INJECTION, SOLUTION SUBCUTANEOUS at 21:58

## 2020-11-22 RX ADMIN — QUETIAPINE FUMARATE 25 MG: 25 TABLET ORAL at 03:51

## 2020-11-22 RX ADMIN — INSULIN GLARGINE 15 UNITS: 100 INJECTION, SOLUTION SUBCUTANEOUS at 00:22

## 2020-11-22 RX ADMIN — Medication 10 ML: at 21:56

## 2020-11-22 RX ADMIN — TRAMADOL HYDROCHLORIDE 100 MG: 50 TABLET, FILM COATED ORAL at 22:32

## 2020-11-22 RX ADMIN — METOPROLOL TARTRATE 12.5 MG: 25 TABLET, FILM COATED ORAL at 21:56

## 2020-11-22 RX ADMIN — PREGABALIN 200 MG: 100 CAPSULE ORAL at 08:35

## 2020-11-22 RX ADMIN — Medication 1000 UNITS: at 12:11

## 2020-11-22 RX ADMIN — PREGABALIN 200 MG: 100 CAPSULE ORAL at 21:55

## 2020-11-22 RX ADMIN — DEXAMETHASONE SODIUM PHOSPHATE 6 MG: 10 INJECTION, SOLUTION INTRAMUSCULAR; INTRAVENOUS at 03:51

## 2020-11-22 RX ADMIN — ACETAMINOPHEN 650 MG: 325 TABLET ORAL at 22:31

## 2020-11-22 RX ADMIN — ENOXAPARIN SODIUM 30 MG: 30 INJECTION SUBCUTANEOUS at 21:56

## 2020-11-22 RX ADMIN — INSULIN LISPRO 1 UNITS: 100 INJECTION, SOLUTION INTRAVENOUS; SUBCUTANEOUS at 12:11

## 2020-11-22 RX ADMIN — ASPIRIN 81 MG CHEWABLE TABLET 81 MG: 81 TABLET CHEWABLE at 08:35

## 2020-11-22 RX ADMIN — ATORVASTATIN CALCIUM 20 MG: 10 TABLET, FILM COATED ORAL at 08:35

## 2020-11-22 ASSESSMENT — PAIN SCALES - GENERAL
PAINLEVEL_OUTOF10: 7
PAINLEVEL_OUTOF10: 5
PAINLEVEL_OUTOF10: 8

## 2020-11-22 ASSESSMENT — PAIN DESCRIPTION - LOCATION
LOCATION: BACK
LOCATION: GENERALIZED

## 2020-11-22 ASSESSMENT — PAIN DESCRIPTION - ONSET: ONSET: ON-GOING

## 2020-11-22 ASSESSMENT — PAIN DESCRIPTION - PAIN TYPE
TYPE: CHRONIC PAIN
TYPE: CHRONIC PAIN

## 2020-11-22 ASSESSMENT — PAIN DESCRIPTION - DESCRIPTORS: DESCRIPTORS: ACHING

## 2020-11-22 ASSESSMENT — PAIN DESCRIPTION - FREQUENCY: FREQUENCY: CONTINUOUS

## 2020-11-22 NOTE — PROGRESS NOTES
Hospitalist Progress Note      PCP: Reynaldo López    Date of Admission: 11/19/2020    Hospital Course: The patient is a 65 y.o. male with DM2, history of right basal ganglia bleed, left carotid stenosis, HTN,with complaint of fatigue, cough, fever and hypoxia ,  covid +    Subjective: in bed feels ok  Few lit o2   Medications:  Reviewed    Infusion Medications    dextrose       Scheduled Medications    cefTRIAXone (ROCEPHIN) IV  1 g Intravenous Q24H    Vitamin D  1,000 Units Oral Daily    [START ON 11/23/2020] remdesivir IVPB  100 mg Intravenous Q24H    pregabalin  200 mg Oral BID    azithromycin  500 mg Intravenous Q24H    aspirin  81 mg Oral Daily    atorvastatin  20 mg Oral Daily    metoprolol tartrate  12.5 mg Oral BID    insulin lispro  0-6 Units Subcutaneous TID WC    insulin lispro  0-3 Units Subcutaneous Nightly    sodium chloride flush  10 mL Intravenous 2 times per day    dexamethasone  6 mg Intravenous Q24H    enoxaparin  30 mg Subcutaneous BID    insulin glargine  15 Units Subcutaneous Nightly     PRN Meds: traMADol **OR** traMADol, glucose, dextrose, glucagon (rDNA), dextrose, sodium chloride flush, acetaminophen **OR** acetaminophen, polyethylene glycol, promethazine **OR** ondansetron      Intake/Output Summary (Last 24 hours) at 11/22/2020 1711  Last data filed at 11/22/2020 1533  Gross per 24 hour   Intake 260 ml   Output 600 ml   Net -340 ml       Physical Exam Performed:    BP (!) 146/71   Pulse 56   Temp 98.7 °F (37.1 °C) (Oral)   Resp 18   Ht 5' 8\" (1.727 m)   Wt 170 lb (77.1 kg)   SpO2 100%   BMI 25.85 kg/m²     General appearance: No apparent distress, appears stated age and cooperative. HEENT: Pupils equal, round, and reactive to light. Sevne Felipe Respiratory:  Normal respiratory effort. Clear to auscultation, bilaterally without Rales/Wheezes/Rhonchi. Cardiovascular: Regular rate and rhythm with normal S1/S2 without murmurs, rubs or gallops.   Abdomen: Soft, non-tender, non-distended with normal bowel sounds. Musculoskeletal: No clubbing, cyanosis or edema bilaterally. Full range of motion without deformity. Skin: Skin color, texture, turgor normal.  No rashes or lesions. Neurologic:  Neurovascularly intact without any focal sensory/motor deficits. Cranial nerves: II-XII intact, grossly non-focal.  Psychiatric: Alert and oriented,       Labs:   Recent Labs     11/21/20  0203 11/21/20  0649 11/22/20  0840   WBC 3.7* 3.2* 3.2*   HGB 12.2* 10.6* 11.1*   HCT 35.8* 31.2* 32.5*   * 107* 113*     Recent Labs     11/20/20  0042 11/21/20  0649   * 137   K 4.8 4.0   CL 95* 107   CO2 23 20*   BUN 24* 28*   CREATININE 1.7* 1.5*   CALCIUM 9.1 8.5     Recent Labs     11/20/20  0042   AST 16   ALT 11   BILITOT 0.6   ALKPHOS 135*     Recent Labs     11/20/20  0042   INR 1.03     Radiology:  XR CHEST PORTABLE   Final Result   Poor delineation of the left hemidiaphragm suggesting left basilar airspace   disease and/or a small left pleural effusion. Assessment/Plan:    Active Hospital Problems    Diagnosis    Pneumonia due to COVID-19 virus [U07.1, J12.89]    Generalized weakness [R53.1]    COVID-19 [U07.1]    Stage 3a chronic kidney disease [N18.31]       acute espiratory failure due to COVID-19 pneumonia. Continue oxygen  DecadronD3  remdesvir  D 2 consultedpulm      CKD      Pancytopenia? b12 iron  Ok    Vit d def replqce     DM2 BS HIGH   iss    Pneumonia ?  On atb     DVT Prophylaxis: lov  Diet: DIET CARB CONTROL;  Code Status: Full Code    PT/OT Eval Status:brittany Maradiaga MD

## 2020-11-22 NOTE — PROGRESS NOTES
Report given and care transferred to [facility]. Patient in stable condition at time of care transfer.

## 2020-11-22 NOTE — CONSULTS
Reason for referral and CC: Hypoxia, COVID 19 pneumonia    HISTORY OF PRESENT ILLNESS: 64 yo with a history of CVA, diabetes, presented 2 days ago with fatigue cough fever and hypoxia on his home monitor. His wife previously tested + for COVID. C/o mild SOB and fatigue. + cough  Past Medical History:   Diagnosis Date    Carotid artery occlusion 3/10    60-69% left (neg stress test 5/10)    Cerebral artery occlusion with cerebral infarction [facility])     Cerebrovascular disease 3/10    ? TIA    Chronic back pain      Dr Charles Smith - pain management    COVID-19 11/20/2020    Gout     Hyperlipidemia     Hypertension     Routine health maintenance     Type II or unspecified type diabetes mellitus without mention of complication, not stated as uncontrolled     eye exam 5/30     Past Surgical History:   Procedure Laterality Date    CATARACT REMOVAL WITH IMPLANT Right     CYSTOSCOPY      RI REOPER, CAROTID ENDARTEC>1 MON Left 8/27/2018    LEFT CAROTID ENDARTERECTOMY performed by Vladimir Jeff MD at [facility]      neg prostate biopsy 1-09    SHOULDER SURGERY      JOINT CLEANING    TURP  2017     Family History  family history is not on file. Social History:  reports that he has never smoked. He has never used smokeless tobacco.   reports no history of alcohol use. ALLERGIES:  Patient is allergic to lisinopril.   Continuous Infusions:   dextrose       Scheduled Meds:   cefTRIAXone (ROCEPHIN) IV  1 g Intravenous Q24H    Vitamin D  1,000 Units Oral Daily    pregabalin  200 mg Oral BID    azithromycin  500 mg Intravenous Q24H    aspirin  81 mg Oral Daily    atorvastatin  20 mg Oral Daily    metoprolol tartrate  12.5 mg Oral BID    insulin lispro  0-6 Units Subcutaneous TID WC    insulin lispro  0-3 Units Subcutaneous Nightly    sodium chloride flush  10 mL Intravenous 2 times per day    dexamethasone  6 mg Intravenous Q24H    enoxaparin  30 mg Subcutaneous BID    insulin glargine 15 Units Subcutaneous Nightly     PRN Meds:  traMADol **OR** traMADol, glucose, dextrose, glucagon (rDNA), dextrose, sodium chloride flush, acetaminophen **OR** acetaminophen, polyethylene glycol, promethazine **OR** ondansetron    REVIEW OF SYSTEMS:  Constitutional: + fever  HENT: Negative for sore throat  Eyes: Negative for redness   Respiratory: + dyspnea, cough  Cardiovascular: Negative for chest pain  Gastrointestinal: Negative for vomiting, diarrhea   Genitourinary: Negative for hematuria   Musculoskeletal: Negative for arthralgias   Skin: Negative for rash  Neurological: Negative for syncope  Hematological: Negative for adenopathy  Psychiatric/Behavorial: Negative for anxiety    PHYSICAL EXAM: BP (!) 146/71   Pulse 56   Temp 98.7 °F (37.1 °C) (Oral)   Resp 18   Ht 5' 8\" (1.727 m)   Wt 170 lb (77.1 kg)   SpO2 100%   BMI 25.85 kg/m²  on 2lpm  Constitutional:  No acute distress. Appears well developed and nourished. Eyes: EOM intact. Conjunctivae anicteric. No visible discharge. HENT: Head is normocephalic and atraumatic. Mucus membranes are moist and the tongue appears normal. Normal appearing nose. External Ears normal.   Neck: No obvious mass and the trachea is midline. Respiratory: No accessory muscle usage. CTA anteriorly by e-stethescope  Cardiovascular: No LE edema. Psychiatric: No anxiety or Agitation. Alert and Oriented to person, place and time. Normal judgement and insight.     CBC:   Recent Labs     11/21/20  0203 11/21/20  0649 11/22/20  0840   WBC 3.7* 3.2* 3.2*   HGB 12.2* 10.6* 11.1*   HCT 35.8* 31.2* 32.5*   MCV 97.6 99.2 97.2   * 107* 113*     BMP:   Recent Labs     11/20/20  0042 11/21/20  0649   * 137   K 4.8 4.0   CL 95* 107   CO2 23 20*   BUN 24* 28*   CREATININE 1.7* 1.5*        Recent Labs     11/20/20  0042   AST 16   ALT 11   BILITOT 0.6   ALKPHOS 135*     Recent Labs     11/20/20  0042   PROTIME 11.9   INR 1.03     No results for input(s): NITRITE, COLORU, PHUR, LABCAST, WBCUA, RBCUA, MUCUS, TRICHOMONAS, YEAST, BACTERIA, CLARITYU, SPECGRAV, LEUKOCYTESUR, UROBILINOGEN, BILIRUBINUR, BLOODU, GLUCOSEU, AMORPHOUS in the last 72 hours. Invalid input(s): KETONESU  No results for input(s): PHART, MSF4RNC, PO2ART in the last 72 hours. 11/20/20 COVID 19 +  PCT 0.70    Chest imaging was reviewed by me and showed 11/20/20  Poor delineation of the left hemidiaphragm suggesting left basilar airspace    disease and/or a small left pleural effusion.        ASSESSMENT:  · Covid 19 Pneumonia  · Acute hypoxic respiratory failure  · Possible secondary PNA given elevated PCT  · Pancytopenia  · CKD  · CAD  · DM2    PLAN:  · Droplet + precautions  Supplemental oxygen to maintain SaO2 >92%; wean as tolerated   · Decadron D#3  · Start Remdesivir given hypoxia; monitor LFT, renal  · CTX/Azithro D#3  · Lovenox BID    Thank you Cali Antoine, * for this consult

## 2020-11-22 NOTE — FLOWSHEET NOTE
11/22/20 0807   Vital Signs   Temp 98 °F (36.7 °C)   Temp Source Oral   Pulse 68   Heart Rate Source Monitor   Resp 18   /63   BP Location Right upper arm   BP Upper/Lower Upper   Patient Position Lying left side   Level of Consciousness 0   MEWS Score 1   Oxygen Therapy   SpO2 95 %   O2 Device Nasal cannula   O2 Flow Rate (L/min) 2 L/min   Pt resting in bed, answers questions appropriately with minimal response, states he is tired and did not sleep well. AM assessment complete. States he is not SOB and has not coughing. He does have decreased appetite.

## 2020-11-22 NOTE — CARE COORDINATION
Case Management Assessment  Initial Evaluation      Patient Name: Cherylene Hem  YOB: 1950  Diagnosis: Pneumonia due to COVID-19 virus [U07.1, J12.89]  Date / Time: 11/19/2020 11:03 PM    Admission status/Date:11/20/2020 inpt  Chart Reviewed: Yes      Patient Interviewed: No   Family Interviewed:  Yes - spouse      Hospitalization in the last 30 days:  No      Health Care Decision Maker :     (CM - must 1st enter selection under Navigator - emergency contact- Stephanie Relationship and pick relationship)   Who do you trust or have selected to make healthcare decisions for you      Met with: spouse  Kiah Dillon conducted  (bedside/phone):telephone    Current PCP:   [address] [employer]    ADLS  Support Systems/Care Needs: Spouse/Significant Other  Transportation: self    Meal Preparation: self    Housing  Living Arrangements: lives in 1 story house with spouse  Steps: [address] for return to present living arrangements: Yes  Identified Issues:     Home Care Information  Active with Home Health Care : No Agency:(Services)  Type of Home Care Services: None  Passport/Waiver : No  :                      Phone Number:    Passport/Waiver Services:           Durable Medical Equiptment   DME Provider: none  Equipment:   Walker_X__Cane___RTS___ BSC___Shower Chair___Hospital Bed___W/C____Other________  02 at ____Liter(s)---wears(frequency)_______ HHN ___ CPAP___ BiPap___   N/A____      Home O2 Use :  No    If No for home O2---if presently on O2 during hospitalization:  Yes  if yes CM to follow for potential DC O2 need    Community Service Affiliation  Dialysis:  No    · Agency:  · Location:  · Dialysis Schedule:  · Phone:   · Fax: Other Community Services: (ex:PT/OT,Mental Health,Wound Clinic, Cardio/Pul [address])    DISCHARGE PLAN: Explained Case Management role/services.  Chart reviewed, attempted to reach pt in his room with no answer, spoke with spouse via telephone. Spouse states pt lives with her in 1 story house and plan is for him to return. Pt ambulates with walker. +drives. Will follow for dc needs.

## 2020-11-23 LAB
ALBUMIN SERPL-MCNC: 3.5 G/DL (ref 3.4–5)
ALP BLD-CCNC: 97 U/L (ref 40–129)
ALT SERPL-CCNC: 19 U/L (ref 10–40)
ANION GAP SERPL CALCULATED.3IONS-SCNC: 12 MMOL/L (ref 3–16)
AST SERPL-CCNC: 47 U/L (ref 15–37)
BASOPHILS ABSOLUTE: 0 K/UL (ref 0–0.2)
BASOPHILS RELATIVE PERCENT: 0.1 %
BILIRUB SERPL-MCNC: 0.4 MG/DL (ref 0–1)
BILIRUBIN DIRECT: <0.2 MG/DL (ref 0–0.3)
BILIRUBIN, INDIRECT: ABNORMAL MG/DL (ref 0–1)
BUN BLDV-MCNC: 33 MG/DL (ref 7–20)
CALCIUM SERPL-MCNC: 9.1 MG/DL (ref 8.3–10.6)
CHLORIDE BLD-SCNC: 103 MMOL/L (ref 99–110)
CO2: 24 MMOL/L (ref 21–32)
CREAT SERPL-MCNC: 1.4 MG/DL (ref 0.8–1.3)
EKG ATRIAL RATE: 66 BPM
EKG DIAGNOSIS: NORMAL
EKG P AXIS: 23 DEGREES
EKG P-R INTERVAL: 142 MS
EKG Q-T INTERVAL: 426 MS
EKG QRS DURATION: 82 MS
EKG QTC CALCULATION (BAZETT): 446 MS
EKG R AXIS: -10 DEGREES
EKG T AXIS: 45 DEGREES
EKG VENTRICULAR RATE: 66 BPM
EOSINOPHILS ABSOLUTE: 0 K/UL (ref 0–0.6)
EOSINOPHILS RELATIVE PERCENT: 0 %
FIBRINOGEN: 962 MG/DL (ref 200–397)
GFR AFRICAN AMERICAN: >60
GFR NON-AFRICAN AMERICAN: 50
GLUCOSE BLD-MCNC: 118 MG/DL (ref 70–99)
GLUCOSE BLD-MCNC: 128 MG/DL (ref 70–99)
GLUCOSE BLD-MCNC: 190 MG/DL (ref 70–99)
GLUCOSE BLD-MCNC: 240 MG/DL (ref 70–99)
GLUCOSE BLD-MCNC: 282 MG/DL (ref 70–99)
GLUCOSE BLD-MCNC: 80 MG/DL (ref 70–99)
HCT VFR BLD CALC: 34.6 % (ref 40.5–52.5)
HEMOGLOBIN: 11.8 G/DL (ref 13.5–17.5)
LYMPHOCYTES ABSOLUTE: 0.3 K/UL (ref 1–5.1)
LYMPHOCYTES RELATIVE PERCENT: 8.6 %
MAGNESIUM: 1.9 MG/DL (ref 1.8–2.4)
MCH RBC QN AUTO: 33 PG (ref 26–34)
MCHC RBC AUTO-ENTMCNC: 34.1 G/DL (ref 31–36)
MCV RBC AUTO: 96.6 FL (ref 80–100)
MONOCYTES ABSOLUTE: 0.1 K/UL (ref 0–1.3)
MONOCYTES RELATIVE PERCENT: 1.7 %
NEUTROPHILS ABSOLUTE: 3.3 K/UL (ref 1.7–7.7)
NEUTROPHILS RELATIVE PERCENT: 89.6 %
PDW BLD-RTO: 13.7 % (ref 12.4–15.4)
PERFORMED ON: ABNORMAL
PHOSPHORUS: 3.3 MG/DL (ref 2.5–4.9)
PLATELET # BLD: 126 K/UL (ref 135–450)
PMV BLD AUTO: 7.5 FL (ref 5–10.5)
POTASSIUM SERPL-SCNC: 3.9 MMOL/L (ref 3.5–5.1)
RBC # BLD: 3.58 M/UL (ref 4.2–5.9)
SODIUM BLD-SCNC: 139 MMOL/L (ref 136–145)
TOTAL PROTEIN: 6.9 G/DL (ref 6.4–8.2)
TROPONIN: <0.01 NG/ML
TROPONIN: <0.01 NG/ML
WBC # BLD: 3.7 K/UL (ref 4–11)

## 2020-11-23 PROCEDURE — 6360000002 HC RX W HCPCS: Performed by: INTERNAL MEDICINE

## 2020-11-23 PROCEDURE — 2580000003 HC RX 258: Performed by: INTERNAL MEDICINE

## 2020-11-23 PROCEDURE — 6370000000 HC RX 637 (ALT 250 FOR IP): Performed by: PHYSICIAN ASSISTANT

## 2020-11-23 PROCEDURE — 2580000003 HC RX 258: Performed by: HOSPITALIST

## 2020-11-23 PROCEDURE — 80076 HEPATIC FUNCTION PANEL: CPT

## 2020-11-23 PROCEDURE — 6370000000 HC RX 637 (ALT 250 FOR IP): Performed by: HOSPITALIST

## 2020-11-23 PROCEDURE — 6360000002 HC RX W HCPCS: Performed by: HOSPITALIST

## 2020-11-23 PROCEDURE — 83735 ASSAY OF MAGNESIUM: CPT

## 2020-11-23 PROCEDURE — 2500000003 HC RX 250 WO HCPCS: Performed by: INTERNAL MEDICINE

## 2020-11-23 PROCEDURE — 94761 N-INVAS EAR/PLS OXIMETRY MLT: CPT

## 2020-11-23 PROCEDURE — 85384 FIBRINOGEN ACTIVITY: CPT

## 2020-11-23 PROCEDURE — 6360000002 HC RX W HCPCS: Performed by: FAMILY MEDICINE

## 2020-11-23 PROCEDURE — 83036 HEMOGLOBIN GLYCOSYLATED A1C: CPT

## 2020-11-23 PROCEDURE — 99232 SBSQ HOSP IP/OBS MODERATE 35: CPT | Performed by: INTERNAL MEDICINE

## 2020-11-23 PROCEDURE — 93005 ELECTROCARDIOGRAM TRACING: CPT | Performed by: PHYSICIAN ASSISTANT

## 2020-11-23 PROCEDURE — 36415 COLL VENOUS BLD VENIPUNCTURE: CPT

## 2020-11-23 PROCEDURE — 85025 COMPLETE CBC W/AUTO DIFF WBC: CPT

## 2020-11-23 PROCEDURE — 84484 ASSAY OF TROPONIN QUANT: CPT

## 2020-11-23 PROCEDURE — 99222 1ST HOSP IP/OBS MODERATE 55: CPT | Performed by: INTERNAL MEDICINE

## 2020-11-23 PROCEDURE — 99233 SBSQ HOSP IP/OBS HIGH 50: CPT | Performed by: INTERNAL MEDICINE

## 2020-11-23 PROCEDURE — 93010 ELECTROCARDIOGRAM REPORT: CPT | Performed by: INTERNAL MEDICINE

## 2020-11-23 PROCEDURE — 2700000000 HC OXYGEN THERAPY PER DAY

## 2020-11-23 PROCEDURE — 1200000000 HC SEMI PRIVATE

## 2020-11-23 PROCEDURE — 80069 RENAL FUNCTION PANEL: CPT

## 2020-11-23 RX ORDER — POTASSIUM CHLORIDE 750 MG/1
10 TABLET, EXTENDED RELEASE ORAL ONCE
Status: COMPLETED | OUTPATIENT
Start: 2020-11-23 | End: 2020-11-23

## 2020-11-23 RX ADMIN — Medication 10 ML: at 05:00

## 2020-11-23 RX ADMIN — PREGABALIN 200 MG: 100 CAPSULE ORAL at 08:21

## 2020-11-23 RX ADMIN — PREGABALIN 200 MG: 100 CAPSULE ORAL at 23:42

## 2020-11-23 RX ADMIN — INSULIN LISPRO 3 UNITS: 100 INJECTION, SOLUTION INTRAVENOUS; SUBCUTANEOUS at 21:57

## 2020-11-23 RX ADMIN — DEXTROSE MONOHYDRATE 500 MG: 50 INJECTION, SOLUTION INTRAVENOUS at 08:22

## 2020-11-23 RX ADMIN — ATORVASTATIN CALCIUM 20 MG: 10 TABLET, FILM COATED ORAL at 08:21

## 2020-11-23 RX ADMIN — TRAMADOL HYDROCHLORIDE 100 MG: 50 TABLET, FILM COATED ORAL at 23:43

## 2020-11-23 RX ADMIN — METOPROLOL TARTRATE 12.5 MG: 25 TABLET, FILM COATED ORAL at 08:21

## 2020-11-23 RX ADMIN — INSULIN GLARGINE 15 UNITS: 100 INJECTION, SOLUTION SUBCUTANEOUS at 21:58

## 2020-11-23 RX ADMIN — Medication 10 ML: at 08:23

## 2020-11-23 RX ADMIN — INSULIN LISPRO 2 UNITS: 100 INJECTION, SOLUTION INTRAVENOUS; SUBCUTANEOUS at 17:46

## 2020-11-23 RX ADMIN — ENOXAPARIN SODIUM 30 MG: 30 INJECTION SUBCUTANEOUS at 23:46

## 2020-11-23 RX ADMIN — Medication 1000 UNITS: at 08:21

## 2020-11-23 RX ADMIN — DEXAMETHASONE SODIUM PHOSPHATE 6 MG: 10 INJECTION, SOLUTION INTRAMUSCULAR; INTRAVENOUS at 05:00

## 2020-11-23 RX ADMIN — TRAMADOL HYDROCHLORIDE 100 MG: 50 TABLET, FILM COATED ORAL at 12:57

## 2020-11-23 RX ADMIN — ACETAMINOPHEN 650 MG: 325 TABLET ORAL at 08:35

## 2020-11-23 RX ADMIN — ASPIRIN 81 MG CHEWABLE TABLET 81 MG: 81 TABLET CHEWABLE at 08:21

## 2020-11-23 RX ADMIN — ENOXAPARIN SODIUM 30 MG: 30 INJECTION SUBCUTANEOUS at 08:22

## 2020-11-23 RX ADMIN — Medication 10 ML: at 23:47

## 2020-11-23 RX ADMIN — METOPROLOL TARTRATE 12.5 MG: 25 TABLET, FILM COATED ORAL at 23:42

## 2020-11-23 RX ADMIN — POTASSIUM CHLORIDE 10 MEQ: 750 TABLET, EXTENDED RELEASE ORAL at 10:01

## 2020-11-23 RX ADMIN — TRAMADOL HYDROCHLORIDE 100 MG: 50 TABLET, FILM COATED ORAL at 05:00

## 2020-11-23 RX ADMIN — REMDESIVIR 100 MG: 100 INJECTION, POWDER, LYOPHILIZED, FOR SOLUTION INTRAVENOUS at 19:02

## 2020-11-23 RX ADMIN — CEFTRIAXONE SODIUM 1 G: 1 INJECTION, POWDER, FOR SOLUTION INTRAMUSCULAR; INTRAVENOUS at 10:01

## 2020-11-23 ASSESSMENT — PAIN SCALES - GENERAL
PAINLEVEL_OUTOF10: 7
PAINLEVEL_OUTOF10: 8
PAINLEVEL_OUTOF10: 8
PAINLEVEL_OUTOF10: 7
PAINLEVEL_OUTOF10: 9
PAINLEVEL_OUTOF10: 9
PAINLEVEL_OUTOF10: 4

## 2020-11-23 ASSESSMENT — PAIN DESCRIPTION - PAIN TYPE
TYPE: CHRONIC PAIN

## 2020-11-23 ASSESSMENT — PAIN DESCRIPTION - DESCRIPTORS
DESCRIPTORS: ACHING

## 2020-11-23 ASSESSMENT — PAIN DESCRIPTION - PROGRESSION
CLINICAL_PROGRESSION: GRADUALLY WORSENING

## 2020-11-23 ASSESSMENT — PAIN DESCRIPTION - LOCATION
LOCATION: BACK

## 2020-11-23 ASSESSMENT — PAIN DESCRIPTION - FREQUENCY
FREQUENCY: CONTINUOUS
FREQUENCY: CONTINUOUS

## 2020-11-23 ASSESSMENT — PAIN - FUNCTIONAL ASSESSMENT
PAIN_FUNCTIONAL_ASSESSMENT: PREVENTS OR INTERFERES SOME ACTIVE ACTIVITIES AND ADLS
PAIN_FUNCTIONAL_ASSESSMENT: PREVENTS OR INTERFERES SOME ACTIVE ACTIVITIES AND ADLS

## 2020-11-23 ASSESSMENT — PAIN DESCRIPTION - ONSET
ONSET: ON-GOING
ONSET: ON-GOING

## 2020-11-23 NOTE — PROGRESS NOTES
Pulmonary Progress Note  CC: COVID 19 pneumonia    Subjective:    NSVT asymptomatic today    EXAM: /64   Pulse 73   Temp 97.6 °F (36.4 °C) (Oral)   Resp 18   Ht 5' 8\" (1.727 m)   Wt 170 lb (77.1 kg)   SpO2 97%   BMI 25.85 kg/m²  on 4lpm  Constitutional:  No acute distress. Eyes: PERRL. Conjunctivae anicteric. ENT: Normal nose. Normal tongue. Neck:  Trachea is midline. No thyroid tenderness. Respiratory: No accessory muscle usage. No wheezes. No rales. No Rhonchi. Cardiovascular: Normal S1S2. No digit clubbing. No digit cyanosis. No LE edema. Psychiatric: No anxiety or Agitation. Alert and Oriented to person, place and time.     Scheduled Meds:   cefTRIAXone (ROCEPHIN) IV  1 g Intravenous Q24H    Vitamin D  1,000 Units Oral Daily    remdesivir IVPB  100 mg Intravenous Q24H    pregabalin  200 mg Oral BID    azithromycin  500 mg Intravenous Q24H    aspirin  81 mg Oral Daily    atorvastatin  20 mg Oral Daily    metoprolol tartrate  12.5 mg Oral BID    insulin lispro  0-6 Units Subcutaneous TID WC    insulin lispro  0-3 Units Subcutaneous Nightly    sodium chloride flush  10 mL Intravenous 2 times per day    dexamethasone  6 mg Intravenous Q24H    enoxaparin  30 mg Subcutaneous BID    insulin glargine  15 Units Subcutaneous Nightly     Continuous Infusions:   dextrose       PRN Meds:  traMADol **OR** traMADol, glucose, dextrose, glucagon (rDNA), dextrose, sodium chloride flush, acetaminophen **OR** acetaminophen, polyethylene glycol, promethazine **OR** ondansetron    Labs:  CBC:   Recent Labs     11/21/20  0649 11/22/20  0840 11/23/20  0603   WBC 3.2* 3.2* 3.7*   HGB 10.6* 11.1* 11.8*   HCT 31.2* 32.5* 34.6*   MCV 99.2 97.2 96.6   * 113* 126*     BMP:   Recent Labs     11/21/20  0649 11/23/20  0603    139   K 4.0 3.9    103   CO2 20* 24   PHOS  --  3.3   BUN 28* 33*   CREATININE 1.5* 1.4*       Cultures:  11/20/20 COVID 19 +  PCT 0.70     Chest imaging was reviewed by me and showed 11/20/20  Poor delineation of the left hemidiaphragm suggesting left basilar airspace    disease and/or a small left pleural effusion.         ASSESSMENT:  · Covid 19 Pneumonia  · Acute hypoxic respiratory failure  · Possible secondary PNA given elevated PCT  · Pancytopenia  · CKD  · CAD  · DM2  · BRIJESH     PLAN:  · Droplet + precautions  · Supplemental oxygen to maintain SaO2 >92%; wean as tolerated   · Decadron D#4  · Remdesivir D#2, started due to developing hypoxia  · CTX/Azithro D#4  · Lovenox BID

## 2020-11-23 NOTE — PROGRESS NOTES
Rn checked on patient in room. Patient speaking to CM on phone. He denies any request for drink or snack, or other needs. He states he does not feel SOB. VSS. IV remdesivir infusing. No needs at this time. Will continue to monitor.

## 2020-11-23 NOTE — PROGRESS NOTES
Pt with complaints of 8/10 chronic back pain and a \"headache\". PRN Toradol and tylenol administered, see MAR.

## 2020-11-23 NOTE — PROGRESS NOTES
Hospitalist Progress Note      PCP: Clarisse Estimable    Date of Admission: 11/19/2020    Hospital Course: The patient is a 65 y.o. male with DM2, history of right basal ganglia bleed, left carotid stenosis, HTN,with complaint of fatigue, cough, fever and hypoxia ,  covid +    Subjective: in bed feels ok  On 4 L of O2    Medications:  Reviewed    Infusion Medications    dextrose       Scheduled Medications    cefTRIAXone (ROCEPHIN) IV  1 g Intravenous Q24H    Vitamin D  1,000 Units Oral Daily    remdesivir IVPB  100 mg Intravenous Q24H    pregabalin  200 mg Oral BID    azithromycin  500 mg Intravenous Q24H    aspirin  81 mg Oral Daily    atorvastatin  20 mg Oral Daily    metoprolol tartrate  12.5 mg Oral BID    insulin lispro  0-6 Units Subcutaneous TID WC    insulin lispro  0-3 Units Subcutaneous Nightly    sodium chloride flush  10 mL Intravenous 2 times per day    dexamethasone  6 mg Intravenous Q24H    enoxaparin  30 mg Subcutaneous BID    insulin glargine  15 Units Subcutaneous Nightly     PRN Meds: traMADol **OR** traMADol, glucose, dextrose, glucagon (rDNA), dextrose, sodium chloride flush, acetaminophen **OR** acetaminophen, polyethylene glycol, promethazine **OR** ondansetron      Intake/Output Summary (Last 24 hours) at 11/23/2020 1639  Last data filed at 11/23/2020 1433  Gross per 24 hour   Intake 300 ml   Output 450 ml   Net -150 ml       Physical Exam Performed:    BP (!) 140/77   Pulse 55   Temp 97.3 °F (36.3 °C) (Oral)   Resp 18   Ht 5' 8\" (1.727 m)   Wt 170 lb (77.1 kg)   SpO2 97%   BMI 25.85 kg/m²     General appearance: No apparent distress, appears stated age and cooperative. HEENT: Pupils equal, round, and reactive to light. [place] Glow Respiratory:  Normal respiratory effort. Clear to auscultation, bilaterally without Rales/Wheezes/Rhonchi. Cardiovascular: Regular rate and rhythm with normal S1/S2 without murmurs, rubs or gallops.   Abdomen: Soft, non-tender, non-distended with

## 2020-11-23 NOTE — CONSULTS
[facility]  [phone]        Reason for Consultation/Chief Complaint: \"I have been asked to see the patient for Non sustained V. Tach . \"No prior heart disease. He is admitted with         History of Present Illness:  Allan Mock is a 65 y.o. patient who presented to the hospital with complaints of fatigue fever and cough. History of cerebrovascular disease HTN HLD DM2 PAD but no known heart disease. Past Medical History:   has a past medical history of Carotid artery occlusion, Cerebral artery occlusion with cerebral infarction [facility]), Cerebrovascular disease, Chronic back pain, COVID-19, Gout, Hyperlipidemia, Hypertension, Routine health maintenance, and Type II or unspecified type diabetes mellitus without mention of complication, not stated as uncontrolled. Surgical History:   has a past surgical history that includes Prostate surgery; shoulder surgery; TURP (2017); Cataract removal with implant (Right); Cystoscopy; and pr reoper, carotid endartec>1 mon (Left, 8/27/2018). Social History:   reports that he has never smoked. He has never used smokeless tobacco. He reports that he does not drink alcohol or use drugs. Family History:  No premature heart disease    Home Medications:  Were reviewed and are listed in nursing record. and/or listed below  Prior to Admission medications    Medication Sig Start Date End Date Taking? Authorizing Provider   traMADol (ULTRAM) 50 MG tablet Take 50 mg by mouth every 8 hours as needed for Pain.  1 to 2 tablets for pain   Yes Historical Provider, MD   aspirin 81 MG chewable tablet Take 1 tablet by mouth daily 5/24/18   Zeyad Victoria MD   docusate sodium (COLACE) 100 MG capsule Take 1 capsule by mouth daily 5/12/18   Rob Gillis MD   atorvastatin (LIPITOR) 10 MG tablet Take 20 mg by mouth daily    Historical Provider, MD   metoprolol tartrate (LOPRESSOR) 25 MG tablet Take 12.5 mg by mouth 2 times daily    Historical Provider, MD   insulin glargine (LANTUS) 100 UNIT/ML injection Inject 10 Units into the skin nightly. Patient taking differently: Inject 20 Units into the skin 2 times daily  6/1/10 8/27/18  Pérez Godinez MD   pregabalin (LYRICA) 100 MG capsule Take 200 mg by mouth 2 times daily. 3/18/10   Historical Provider, MD        Allergies:  Lisinopril     Review of Systems:   Due to the current efforts to prevent transmission of COVID-19 and also the need to preserve PPE for other caregivers, a face-to-face encounter with the patient was not performed. That being said, all relevant records and diagnostic tests were reviewed, including laboratory results and imaging. Please reference any relevant documentation elsewhere. Care will be coordinated with the primary service.       Physical Examination:    Vitals:    11/23/20 0915   BP: 105/64   Pulse: 73   Resp: 18   Temp: 97.6 °F (36.4 °C)   SpO2: 97%    Weight: 170 lb (77.1 kg)         General Appearance:     Head:     Eyes:         Nose:    Throat:    Neck:        Lungs:      Chest Wall:     Heart:     Abdomen:              Extremities:    Pulses:    Skin:    Pysch:    Neurologic:         Labs  CBC:   Lab Results   Component Value Date    WBC 3.7 11/23/2020    RBC 3.58 11/23/2020    HGB 11.8 11/23/2020    HCT 34.6 11/23/2020    MCV 96.6 11/23/2020    RDW 13.7 11/23/2020     11/23/2020     CMP:    Lab Results   Component Value Date     11/23/2020    K 3.9 11/23/2020    K 4.0 11/21/2020     11/23/2020    CO2 24 11/23/2020    BUN 33 11/23/2020    CREATININE 1.4 11/23/2020    GFRAA >60 11/23/2020    GFRAA >60 03/01/2010    AGRATIO 1.3 11/20/2020    LABGLOM 50 11/23/2020    GLUCOSE 80 11/23/2020    PROT 6.9 11/23/2020    CALCIUM 9.1 11/23/2020    BILITOT 0.4 11/23/2020    ALKPHOS 97 11/23/2020    AST 47 11/23/2020    ALT 19 11/23/2020     PT/INR:  No results found for: PTINR  Lab Results   Component Value Date    TROPONINI <0.01 11/23/2020   I have reviewed the following tests and documented in this encounter as follows:     EKG 11/23/20   Normal sinus rhythmNormal ECGWhen compared with ECG of 20-NOV-2020 00:57,No significant change was found     CXR 11/20/20   Poor delineation of the left hemidiaphragm suggesting left basilar airspace disease and/or a small left pleural effusion. EKG 11/19/20   Normal sinus rhythmNormal ECGNo previous ECGs availableConfirmed by TAMICA VELASQUEZ MD ([id number]) on 11/20/2020 7:28:52 AM     EKG 2/16/20   Normal sinus rhythmNonspecific T wave abnormalityAbnormal ECGWhen compared with ECG of 13-Sep-2018 14:23:46Nonspecific T wave abnormality is now PresentConfirmed by [facility] Shelby DAY ([id number]) on 2/16/2020 4:46:33 PM     CTA head and neck 2/16/20   Mild atherosclerotic change and narrowing at the proximal right internal carotid artery, unchanged  No acute abnormality in the neck  Left lung apical opacification as described. This is very similar compared to the prior allowing for differences in technique. Slight increase in the left distal vertebral artery narrowing. Multifocal significant narrowing again noted involving the left posterior cerebral artery. There is mild additional narrowing involving the right posterior cerebral artery, unchanged. Multifocal cavernous carotid segment narrowing, left greater than right. This is stable. No new areas of abnormal intracranial narrowing are noted otherwise. ECHO 5/22/18   Summary   Normal left ventricle size, wall thickness, and systolic function with an   estimated ejection fraction of 55-60%. No regional wall motion abnormalities are seen. Diastolic filling parameters suggests normal diastolic function. Mild mitral regurgitation is present. Mild tricuspid regurgitation. Estimated pulmonary artery systolic pressure is [id number] assuming a right   atrial pressure of 3mmHg. The left atrium is dilated.    A bubble study was performed and fails to show evidence of right to left shunting. Normal sinus rhythmNonspecific T wave abnormalityAbnormal ECGWhen compared with ECG of 13-Sep-2018 14:23:46Nonspecific T wave abnormality is now PresentConfirmed by [facility] MD, Rosalind Blood ([id number]) on 2/16/2020 4:46:33 PM       Assessment  Non sustained V Tach no evidence of myocardial injury( neg troponin)  Serum potassium is normal   I will add magnesium level  Acute Covid-19  related resp infection   Patient Active Problem List   Diagnosis    Type II or unspecified type diabetes mellitus without mention of complication, not stated as uncontrolled    Chronic back pain    HTN (hypertension), benign    Gout    Carotid artery occlusion    [address]vascular accident (CVA) ([address]    Intracranial hemorrhage ([address]    TIA (transient ischemic attack)    DM (diabetes mellitus), type 2 ([address]    Left carotid artery stenosis    Carotid stenosis, left    Dizziness    Dyslipidemia    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      I recommend that the patient undergo telemetry  Will not start any antiarrhythmics other than beta blockers  No room for up titration due to borderline low BP  Will check Mg level  Asa  Statin   Beta blockers  Once recovered from acute Covid infection recommend stress myoview

## 2020-11-23 NOTE — FLOWSHEET NOTE
11/22/20 2132   Vital Signs   Temp 97.3 °F (36.3 °C)   Temp Source Oral   Pulse 58   Heart Rate Source Monitor   Resp 18   /76   BP Location Left upper arm   BP Upper/Lower Upper   Patient Position Lying right side   Level of Consciousness 0   MEWS Score 1   Oxygen Therapy   SpO2 95 %   O2 Device Nasal cannula   O2 Flow Rate (L/min) 2 L/min   Shift assessment complete, see doc flow sheet. Pt resting in bed. Respirations easy and even. Evening medications administered. Pt SB on the monitor. Pt denies any needs at this time. Call light within reach.

## 2020-11-23 NOTE — PLAN OF CARE
Problem: Airway Clearance - Ineffective  Goal: Achieve or maintain patent airway  11/23/2020 0818 by Pia Baca RN  Outcome: Ongoing  11/23/2020 0113 by Vamsi Goldberg RN  Outcome: Ongoing     Problem: Gas Exchange - Impaired  Goal: Absence of hypoxia  Outcome: Ongoing     Problem: Isolation Precautions - Risk of Spread of Infection  Goal: Prevent transmission of infection  11/23/2020 0818 by Pia Baca RN  Outcome: Ongoing  11/23/2020 0113 by Vamsi Goldberg RN  Outcome: Ongoing     Problem: Pain:  Goal: Pain level will decrease  Description: Pain level will decrease  11/23/2020 0113 by Vamsi Goldberg RN  Outcome: Ongoing

## 2020-11-23 NOTE — PROGRESS NOTES
Telemetry called to notify RN of vtach, HR in 200s, RN checked on patient who is holding still in room, responds he was doing nothing unusual as far as movement. RN Nino Smart in room with patient, pt asymptomatic, converted back to NSR in 70s within 3 min. VSS. Blaine Arias and Dr. Preeti Rangel notified, order for stat EKG placed. Radiology notified. Will continue to monitor.

## 2020-11-23 NOTE — PLAN OF CARE
Problem: Airway Clearance - Ineffective  Goal: Achieve or maintain patent airway  Outcome: Ongoing     Problem: Isolation Precautions - Risk of Spread of Infection  Goal: Prevent transmission of infection  Outcome: Ongoing     Problem: Pain:  Goal: Pain level will decrease  Description: Pain level will decrease  Outcome: Ongoing  Pt will use pain scale 0-10 appropriately. Will assess for pain frequently. Will medicate with pain meds as ordered.

## 2020-11-23 NOTE — CARE COORDINATION
INTERDISCIPLINARY PLAN OF CARE CONFERENCE    Date/Time: 11/23/2020 3:12 PM  Completed by: Lilian Martinez, Case Management      Patient Name:  Michael Conroy  YOB: 1950  Admitting Diagnosis: Pneumonia due to COVID-19 virus [U07.1, J12.89]     Admit Date/Time:  11/19/2020 11:03 PM    Chart reviewed. Interdisciplinary team contacted or reviewed plan related to patient progress and discharge plans. Disciplines included Case Management, Nursing, and Dietitian. Current Status: Stable  PT/OT recommendation for discharge plan of care: N/A    Expected D/C Disposition:  Home  Confirmed plan with patient Yes   Discharge Plan Comments:Reviewed chart and spoke with pt via phone. Plan remains for home at OR. Will follow for poss HHC/O2 needs at OR.     Home O2 in place on admit: No  Pt informed of need to bring portable home O2 tank on day of discharge for nursing to connect prior to leaving:  Not Indicated  Verbalized agreement/Understanding:  Not Indicated

## 2020-11-24 LAB
ALBUMIN SERPL-MCNC: 3 G/DL (ref 3.4–5)
ALP BLD-CCNC: 93 U/L (ref 40–129)
ALT SERPL-CCNC: 21 U/L (ref 10–40)
AST SERPL-CCNC: 33 U/L (ref 15–37)
BASOPHILS ABSOLUTE: 0 K/UL (ref 0–0.2)
BASOPHILS RELATIVE PERCENT: 0.1 %
BILIRUB SERPL-MCNC: 0.3 MG/DL (ref 0–1)
BILIRUBIN DIRECT: <0.2 MG/DL (ref 0–0.3)
BILIRUBIN, INDIRECT: ABNORMAL MG/DL (ref 0–1)
EOSINOPHILS ABSOLUTE: 0 K/UL (ref 0–0.6)
EOSINOPHILS RELATIVE PERCENT: 0 %
ESTIMATED AVERAGE GLUCOSE: 191.5 MG/DL
GLUCOSE BLD-MCNC: 146 MG/DL (ref 70–99)
GLUCOSE BLD-MCNC: 275 MG/DL (ref 70–99)
GLUCOSE BLD-MCNC: 307 MG/DL (ref 70–99)
GLUCOSE BLD-MCNC: 351 MG/DL (ref 70–99)
HBA1C MFR BLD: 8.3 %
HCT VFR BLD CALC: 35.3 % (ref 40.5–52.5)
HEMOGLOBIN: 12 G/DL (ref 13.5–17.5)
LYMPHOCYTES ABSOLUTE: 0.4 K/UL (ref 1–5.1)
LYMPHOCYTES RELATIVE PERCENT: 11.8 %
MCH RBC QN AUTO: 32.9 PG (ref 26–34)
MCHC RBC AUTO-ENTMCNC: 34.2 G/DL (ref 31–36)
MCV RBC AUTO: 96.4 FL (ref 80–100)
MONOCYTES ABSOLUTE: 0.1 K/UL (ref 0–1.3)
MONOCYTES RELATIVE PERCENT: 2.1 %
NEUTROPHILS ABSOLUTE: 2.9 K/UL (ref 1.7–7.7)
NEUTROPHILS RELATIVE PERCENT: 86 %
PDW BLD-RTO: 13.3 % (ref 12.4–15.4)
PERFORMED ON: ABNORMAL
PLATELET # BLD: 133 K/UL (ref 135–450)
PMV BLD AUTO: 7.5 FL (ref 5–10.5)
RBC # BLD: 3.66 M/UL (ref 4.2–5.9)
TOTAL PROTEIN: 6.4 G/DL (ref 6.4–8.2)
WBC # BLD: 3.4 K/UL (ref 4–11)

## 2020-11-24 PROCEDURE — 6360000002 HC RX W HCPCS: Performed by: HOSPITALIST

## 2020-11-24 PROCEDURE — 36415 COLL VENOUS BLD VENIPUNCTURE: CPT

## 2020-11-24 PROCEDURE — 1200000000 HC SEMI PRIVATE

## 2020-11-24 PROCEDURE — 6360000002 HC RX W HCPCS: Performed by: INTERNAL MEDICINE

## 2020-11-24 PROCEDURE — 6370000000 HC RX 637 (ALT 250 FOR IP): Performed by: HOSPITALIST

## 2020-11-24 PROCEDURE — 99232 SBSQ HOSP IP/OBS MODERATE 35: CPT | Performed by: INTERNAL MEDICINE

## 2020-11-24 PROCEDURE — 2500000003 HC RX 250 WO HCPCS: Performed by: INTERNAL MEDICINE

## 2020-11-24 PROCEDURE — 2580000003 HC RX 258: Performed by: HOSPITALIST

## 2020-11-24 PROCEDURE — 6370000000 HC RX 637 (ALT 250 FOR IP): Performed by: INTERNAL MEDICINE

## 2020-11-24 PROCEDURE — 94761 N-INVAS EAR/PLS OXIMETRY MLT: CPT

## 2020-11-24 PROCEDURE — 85025 COMPLETE CBC W/AUTO DIFF WBC: CPT

## 2020-11-24 PROCEDURE — 99233 SBSQ HOSP IP/OBS HIGH 50: CPT | Performed by: INTERNAL MEDICINE

## 2020-11-24 PROCEDURE — 6360000002 HC RX W HCPCS: Performed by: FAMILY MEDICINE

## 2020-11-24 PROCEDURE — 80076 HEPATIC FUNCTION PANEL: CPT

## 2020-11-24 PROCEDURE — 2700000000 HC OXYGEN THERAPY PER DAY

## 2020-11-24 PROCEDURE — 2580000003 HC RX 258: Performed by: INTERNAL MEDICINE

## 2020-11-24 RX ORDER — TAMSULOSIN HYDROCHLORIDE 0.4 MG/1
0.4 CAPSULE ORAL NIGHTLY
COMMUNITY

## 2020-11-24 RX ADMIN — INSULIN LISPRO 3 UNITS: 100 INJECTION, SOLUTION INTRAVENOUS; SUBCUTANEOUS at 22:14

## 2020-11-24 RX ADMIN — ENOXAPARIN SODIUM 30 MG: 30 INJECTION SUBCUTANEOUS at 21:37

## 2020-11-24 RX ADMIN — ASPIRIN 81 MG CHEWABLE TABLET 81 MG: 81 TABLET CHEWABLE at 08:03

## 2020-11-24 RX ADMIN — TRAMADOL HYDROCHLORIDE 100 MG: 50 TABLET, FILM COATED ORAL at 21:38

## 2020-11-24 RX ADMIN — DEXAMETHASONE SODIUM PHOSPHATE 6 MG: 10 INJECTION, SOLUTION INTRAMUSCULAR; INTRAVENOUS at 05:08

## 2020-11-24 RX ADMIN — DEXTROSE MONOHYDRATE 500 MG: 50 INJECTION, SOLUTION INTRAVENOUS at 08:03

## 2020-11-24 RX ADMIN — PREGABALIN 200 MG: 100 CAPSULE ORAL at 21:37

## 2020-11-24 RX ADMIN — REMDESIVIR 100 MG: 100 INJECTION, POWDER, LYOPHILIZED, FOR SOLUTION INTRAVENOUS at 16:12

## 2020-11-24 RX ADMIN — TRAMADOL HYDROCHLORIDE 100 MG: 50 TABLET, FILM COATED ORAL at 08:39

## 2020-11-24 RX ADMIN — Medication 1000 UNITS: at 08:04

## 2020-11-24 RX ADMIN — INSULIN LISPRO 3 UNITS: 100 INJECTION, SOLUTION INTRAVENOUS; SUBCUTANEOUS at 11:28

## 2020-11-24 RX ADMIN — INSULIN LISPRO 4 UNITS: 100 INJECTION, SOLUTION INTRAVENOUS; SUBCUTANEOUS at 17:23

## 2020-11-24 RX ADMIN — ENOXAPARIN SODIUM 30 MG: 30 INJECTION SUBCUTANEOUS at 08:04

## 2020-11-24 RX ADMIN — ATORVASTATIN CALCIUM 20 MG: 10 TABLET, FILM COATED ORAL at 08:03

## 2020-11-24 RX ADMIN — METOPROLOL TARTRATE 12.5 MG: 25 TABLET, FILM COATED ORAL at 08:04

## 2020-11-24 RX ADMIN — INSULIN GLARGINE 15 UNITS: 100 INJECTION, SOLUTION SUBCUTANEOUS at 22:14

## 2020-11-24 RX ADMIN — INSULIN LISPRO 1 UNITS: 100 INJECTION, SOLUTION INTRAVENOUS; SUBCUTANEOUS at 08:00

## 2020-11-24 RX ADMIN — Medication 10 ML: at 21:37

## 2020-11-24 RX ADMIN — PREGABALIN 200 MG: 100 CAPSULE ORAL at 08:04

## 2020-11-24 RX ADMIN — CEFTRIAXONE SODIUM 1 G: 1 INJECTION, POWDER, FOR SOLUTION INTRAMUSCULAR; INTRAVENOUS at 11:00

## 2020-11-24 RX ADMIN — Medication 10 ML: at 08:04

## 2020-11-24 RX ADMIN — METOPROLOL TARTRATE 25 MG: 25 TABLET, FILM COATED ORAL at 21:37

## 2020-11-24 ASSESSMENT — PAIN SCALES - GENERAL
PAINLEVEL_OUTOF10: 2
PAINLEVEL_OUTOF10: 8
PAINLEVEL_OUTOF10: 4
PAINLEVEL_OUTOF10: 9

## 2020-11-24 ASSESSMENT — PAIN DESCRIPTION - ONSET: ONSET: ON-GOING

## 2020-11-24 ASSESSMENT — PAIN - FUNCTIONAL ASSESSMENT: PAIN_FUNCTIONAL_ASSESSMENT: PREVENTS OR INTERFERES SOME ACTIVE ACTIVITIES AND ADLS

## 2020-11-24 ASSESSMENT — PAIN DESCRIPTION - DESCRIPTORS: DESCRIPTORS: ACHING

## 2020-11-24 ASSESSMENT — PAIN DESCRIPTION - PAIN TYPE: TYPE: CHRONIC PAIN

## 2020-11-24 ASSESSMENT — PAIN DESCRIPTION - LOCATION: LOCATION: BACK

## 2020-11-24 ASSESSMENT — PAIN DESCRIPTION - FREQUENCY: FREQUENCY: CONTINUOUS

## 2020-11-24 ASSESSMENT — PAIN DESCRIPTION - PROGRESSION: CLINICAL_PROGRESSION: GRADUALLY WORSENING

## 2020-11-24 NOTE — FLOWSHEET NOTE
11/24/20 0822   Vital Signs   Temp 97.9 °F (36.6 °C)   Temp Source Oral   Pulse 65   Heart Rate Source Monitor   Resp 18   /87   BP Location Right upper arm   BP Upper/Lower Upper   Patient Position Semi fowlers   Level of Consciousness 0   MEWS Score 1   Oxygen Therapy   SpO2 98 %   O2 Device Nasal cannula   O2 Flow Rate (L/min) 4 L/min   AM assessment completed, see flow sheet. Pt is alert and oriented. Vital signs are WNL. Respirations are even & easy. No complaints voiced. Pt denies needs at this time. SR up x 2, and bed in low position. Call light is within reach.

## 2020-11-24 NOTE — PROGRESS NOTES
[address]   Progress Note  Cardiology    CC: sob    HPI: breathing better but still w/ sob. No chest pain    Past Medical History   has a past medical history of Carotid artery occlusion, Cerebral artery occlusion with cerebral infarction [facility]), Cerebrovascular disease, Chronic back pain, COVID-19, Gout, Hyperlipidemia, Hypertension, Routine health maintenance, and Type II or unspecified type diabetes mellitus without mention of complication, not stated as uncontrolled. Past Surgical History   has a past surgical history that includes Prostate surgery; shoulder surgery; TURP (2017); Cataract removal with implant (Right); Cystoscopy; and pr reoper, carotid endartec>1 mon (Left, 8/27/2018). Social History   reports that he has never smoked. He has never used smokeless tobacco. He reports that he does not drink alcohol or use drugs. Family History  family history is not on file. Medications  Prior to Admission medications    Medication Sig Start Date End Date Taking? Authorizing Provider   traMADol (ULTRAM) 50 MG tablet Take 50 mg by mouth every 8 hours as needed for Pain. 1 to 2 tablets for pain   Yes Historical Provider, MD   aspirin 81 MG chewable tablet Take 1 tablet by mouth daily 5/24/18   Zeyad Diehl MD   docusate sodium (COLACE) 100 MG capsule Take 1 capsule by mouth daily 5/12/18   Pamela Reyes MD   atorvastatin (LIPITOR) 10 MG tablet Take 20 mg by mouth daily    Historical Provider, MD   metoprolol tartrate (LOPRESSOR) 25 MG tablet Take 12.5 mg by mouth 2 times daily    Historical Provider, MD   insulin glargine (LANTUS) 100 UNIT/ML injection Inject 10 Units into the skin nightly. Patient taking differently: Inject 20 Units into the skin 2 times daily  6/1/10 8/27/18  Ivan Lanza MD   pregabalin (LYRICA) 100 MG capsule Take 200 mg by mouth 2 times daily.   3/18/10   Historical Provider, MD       Allergies  Lisinopril    Review of Systems      Lab Results   Component Value Date    WBC 3.4 (L) 11/24/2020    HGB 12.0 (L) 11/24/2020    HCT 35.3 (L) 11/24/2020    MCV 96.4 11/24/2020     (L) 11/24/2020     Lab Results   Component Value Date    CREATININE 1.4 (H) 11/23/2020    BUN 33 (H) 11/23/2020     11/23/2020    K 3.9 11/23/2020     11/23/2020    CO2 24 11/23/2020     Lab Results   Component Value Date    INR 1.03 11/20/2020    PROTIME 11.9 11/20/2020        Physical Examination:    /87   Pulse 65   Temp 97.9 °F (36.6 °C) (Oral)   Resp 18   Ht 5' 8\" (1.727 m)   Wt 170 lb (77.1 kg)   SpO2 98%   BMI 25.85 kg/m²      Due to the current efforts to prevent transmission of COVID-19 and also the need to preserve PPE for other caregivers, a face-to-face encounter with the patient was not performed. That being said, all relevant records and diagnostic tests were reviewed, including laboratory results and imaging. Please reference any relevant documentation elsewhere. Care will be coordinated with the primary service. Assessment:    NSVT - likely due to acute illness. Consider underlying ischemic heart diseaseasymptomatic  SOB - still w/ sob  Covid PNA - improving  Bradycardia - intermittent. Will monitor   HTN - controlled  HLD  DM   PAD  Echo from 2018 reviewed - normal LVEF    Plan  Monitor on tele  Monitor labs and replace K and Mg as needed  ASA, statin  BBlk - increase dose  Echo and stress test when recovers form covid.     Tani Snowden MD, 11/24/2020 2:17 PM

## 2020-11-24 NOTE — PROGRESS NOTES
Pulmonary Progress Note  CC: COVID 19 pneumonia    Subjective:        EXAM: /87   Pulse 65   Temp 97.9 °F (36.6 °C) (Oral)   Resp 18   Ht 5' 8\" (1.727 m)   Wt 170 lb (77.1 kg)   SpO2 98%   BMI 25.85 kg/m²  on 4lpm  Constitutional:  No acute distress. Eyes: PERRL. Conjunctivae anicteric. ENT: Normal nose. Normal tongue. Neck:  Trachea is midline. No thyroid tenderness. Respiratory: No accessory muscle usage. No wheezes. No rales. No Rhonchi. Cardiovascular: Normal S1S2. No digit clubbing. No digit cyanosis. No LE edema. Psychiatric: No anxiety or Agitation. Alert and Oriented to person, place and time.     Scheduled Meds:   metoprolol tartrate  25 mg Oral BID    cefTRIAXone (ROCEPHIN) IV  1 g Intravenous Q24H    Vitamin D  1,000 Units Oral Daily    remdesivir IVPB  100 mg Intravenous Q24H    pregabalin  200 mg Oral BID    azithromycin  500 mg Intravenous Q24H    aspirin  81 mg Oral Daily    atorvastatin  20 mg Oral Daily    insulin lispro  0-6 Units Subcutaneous TID WC    insulin lispro  0-3 Units Subcutaneous Nightly    sodium chloride flush  10 mL Intravenous 2 times per day    dexamethasone  6 mg Intravenous Q24H    enoxaparin  30 mg Subcutaneous BID    insulin glargine  15 Units Subcutaneous Nightly     Continuous Infusions:   dextrose       PRN Meds:  traMADol **OR** traMADol, glucose, dextrose, glucagon (rDNA), dextrose, sodium chloride flush, acetaminophen **OR** acetaminophen, polyethylene glycol, promethazine **OR** ondansetron    Labs:  CBC:   Recent Labs     11/22/20  0840 11/23/20  0603 11/24/20  0533   WBC 3.2* 3.7* 3.4*   HGB 11.1* 11.8* 12.0*   HCT 32.5* 34.6* 35.3*   MCV 97.2 96.6 96.4   * 126* 133*     BMP:   Recent Labs     11/23/20  0603      K 3.9      CO2 24   PHOS 3.3   BUN 33*   CREATININE 1.4*       Cultures:  11/20/20 COVID 19 +  PCT 0.70     Chest imaging was reviewed by me and showed 11/20/20  Poor delineation of the left hemidiaphragm suggesting left basilar airspace    disease and/or a small left pleural effusion.         ASSESSMENT:  · Covid 19 Pneumonia  · Acute hypoxic respiratory failure  · Possible secondary PNA given elevated PCT  · Pancytopenia  · CKD  · CAD  · DM2     PLAN:  · Droplet + precautions  · Supplemental oxygen to maintain SaO2 >92%; wean as tolerated   · Decadron D#5  · Remdesivir D#3   · CTX/Azithro D#5  · Lovenox BID

## 2020-11-24 NOTE — FLOWSHEET NOTE
11/24/20 0145   Vital Signs   Temp 97.7 °F (36.5 °C)   Temp Source Oral   Pulse 58   Heart Rate Source Monitor   Resp 18   /71   BP Location Left upper arm   BP Upper/Lower Upper   Patient Position Lying right side   Level of Consciousness 0   MEWS Score 1   Patient Currently in Pain No   Pt resting in bed, eyes closed. Resp easy/even.  Trish Ludwig

## 2020-11-24 NOTE — PLAN OF CARE
Problem: Airway Clearance - Ineffective  Goal: Achieve or maintain patent airway  11/24/2020 1056 by Gomez Franco RN  Outcome: Ongoing     Problem: Gas Exchange - Impaired  Goal: Absence of hypoxia  11/24/2020 1056 by Gomez Franco RN  Outcome: Ongoing     Problem: Breathing Pattern - Ineffective  Goal: Ability to achieve and maintain a regular respiratory rate  11/24/2020 1056 by Gomez Franco RN  Outcome: Ongoing

## 2020-11-24 NOTE — PROGRESS NOTES
Hospitalist Progress Note      PCP: Wanda Christianson    Date of Admission: 11/19/2020    Hospital Course: The patient is a 65 y.o. male with DM2, history of right basal ganglia bleed, left carotid stenosis, HTN,with complaint of fatigue, cough, fever and hypoxia ,  covid +    Subjective: in bed feels ok  On 3 L of O2    Medications:  Reviewed    Infusion Medications    dextrose       Scheduled Medications    cefTRIAXone (ROCEPHIN) IV  1 g Intravenous Q24H    Vitamin D  1,000 Units Oral Daily    remdesivir IVPB  100 mg Intravenous Q24H    pregabalin  200 mg Oral BID    azithromycin  500 mg Intravenous Q24H    aspirin  81 mg Oral Daily    atorvastatin  20 mg Oral Daily    metoprolol tartrate  12.5 mg Oral BID    insulin lispro  0-6 Units Subcutaneous TID WC    insulin lispro  0-3 Units Subcutaneous Nightly    sodium chloride flush  10 mL Intravenous 2 times per day    dexamethasone  6 mg Intravenous Q24H    enoxaparin  30 mg Subcutaneous BID    insulin glargine  15 Units Subcutaneous Nightly     PRN Meds: traMADol **OR** traMADol, glucose, dextrose, glucagon (rDNA), dextrose, sodium chloride flush, acetaminophen **OR** acetaminophen, polyethylene glycol, promethazine **OR** ondansetron      Intake/Output Summary (Last 24 hours) at 11/24/2020 1329  Last data filed at 11/24/2020 1217  Gross per 24 hour   Intake 1100 ml   Output 1650 ml   Net -550 ml       Physical Exam Performed:    /87   Pulse 65   Temp 97.9 °F (36.6 °C) (Oral)   Resp 18   Ht 5' 8\" (1.727 m)   Wt 170 lb (77.1 kg)   SpO2 98%   BMI 25.85 kg/m²       Spoke with the patient over the phone. He tells me that he is doing better. He says he is sitting up in the chair. He is currently on 3 L of oxygen. He is alert and oriented.       Labs:   Recent Labs     11/22/20  0840 11/23/20  0603 11/24/20  0533   WBC 3.2* 3.7* 3.4*   HGB 11.1* 11.8* 12.0*   HCT 32.5* 34.6* 35.3*   * 126* 133*     Recent Labs     11/23/20  0603    K 3.9      CO2 24   BUN 33*   CREATININE 1.4*   CALCIUM 9.1   PHOS 3.3     Recent Labs     11/23/20  0603 11/24/20  0533   AST 47* 33   ALT 19 21   BILIDIR <0.2 <0.2   BILITOT 0.4 0.3   ALKPHOS 97 93     No results for input(s): INR in the last 72 hours. Radiology:  XR CHEST PORTABLE   Final Result   Poor delineation of the left hemidiaphragm suggesting left basilar airspace   disease and/or a small left pleural effusion. Assessment/Plan:    Active Hospital Problems    Diagnosis    Acute respiratory failure with hypoxia ([facility] [address] [J96.01]    Pneumonia due to COVID-19 virus [U07.1, J12.89]    Generalized weakness [R53.1]    COVID-19 [U07.1]    Stage 3a chronic kidney disease [N18.31]       Acute respiratory failure due to COVID-19 pneumonia. Continue oxygen-now on 3 L  Decadron D 5  remdesvir  D 3/5  Continue azithromycin and Rocephin day 5    CKD,stage 3   Stable. Nonsustained V. tach. Serum potassium is normal.  Magnesium level checked and is normal.  Cardiology consult. Continue aspirin, statin and beta-blockers  Stress Myoview once recovered from acute Covid infection. Pancytopenia? b12 iron  Ok    DM2   Blood sugar readings high.   Sliding scale insulin      DVT Prophylaxis: lov  Diet: DIET CARB CONTROL;  Code Status: Full Code    PT/OT Eval Status:brittany Gao MD

## 2020-11-25 LAB
ALBUMIN SERPL-MCNC: 3.1 G/DL (ref 3.4–5)
ALP BLD-CCNC: 93 U/L (ref 40–129)
ALT SERPL-CCNC: 17 U/L (ref 10–40)
ANION GAP SERPL CALCULATED.3IONS-SCNC: 13 MMOL/L (ref 3–16)
AST SERPL-CCNC: 24 U/L (ref 15–37)
BASOPHILS ABSOLUTE: 0 K/UL (ref 0–0.2)
BASOPHILS RELATIVE PERCENT: 0.1 %
BILIRUB SERPL-MCNC: 0.3 MG/DL (ref 0–1)
BILIRUBIN DIRECT: <0.2 MG/DL (ref 0–0.3)
BILIRUBIN, INDIRECT: ABNORMAL MG/DL (ref 0–1)
BUN BLDV-MCNC: 46 MG/DL (ref 7–20)
CALCIUM SERPL-MCNC: 9.1 MG/DL (ref 8.3–10.6)
CHLORIDE BLD-SCNC: 100 MMOL/L (ref 99–110)
CO2: 24 MMOL/L (ref 21–32)
CREAT SERPL-MCNC: 1.4 MG/DL (ref 0.8–1.3)
EOSINOPHILS ABSOLUTE: 0 K/UL (ref 0–0.6)
EOSINOPHILS RELATIVE PERCENT: 0.2 %
GFR AFRICAN AMERICAN: >60
GFR NON-AFRICAN AMERICAN: 50
GLUCOSE BLD-MCNC: 164 MG/DL (ref 70–99)
GLUCOSE BLD-MCNC: 192 MG/DL (ref 70–99)
GLUCOSE BLD-MCNC: 262 MG/DL (ref 70–99)
GLUCOSE BLD-MCNC: 284 MG/DL (ref 70–99)
GLUCOSE BLD-MCNC: 386 MG/DL (ref 70–99)
GLUCOSE BLD-MCNC: 402 MG/DL (ref 70–99)
HCT VFR BLD CALC: 33.3 % (ref 40.5–52.5)
HEMOGLOBIN: 11.4 G/DL (ref 13.5–17.5)
LYMPHOCYTES ABSOLUTE: 0.4 K/UL (ref 1–5.1)
LYMPHOCYTES RELATIVE PERCENT: 9.1 %
MCH RBC QN AUTO: 33 PG (ref 26–34)
MCHC RBC AUTO-ENTMCNC: 34.3 G/DL (ref 31–36)
MCV RBC AUTO: 96.2 FL (ref 80–100)
MONOCYTES ABSOLUTE: 0.1 K/UL (ref 0–1.3)
MONOCYTES RELATIVE PERCENT: 1.5 %
NEUTROPHILS ABSOLUTE: 3.8 K/UL (ref 1.7–7.7)
NEUTROPHILS RELATIVE PERCENT: 89.1 %
PDW BLD-RTO: 13.3 % (ref 12.4–15.4)
PERFORMED ON: ABNORMAL
PLATELET # BLD: 160 K/UL (ref 135–450)
PMV BLD AUTO: 8 FL (ref 5–10.5)
POTASSIUM SERPL-SCNC: 4.1 MMOL/L (ref 3.5–5.1)
RBC # BLD: 3.46 M/UL (ref 4.2–5.9)
SODIUM BLD-SCNC: 137 MMOL/L (ref 136–145)
TOTAL PROTEIN: 6.1 G/DL (ref 6.4–8.2)
WBC # BLD: 4.2 K/UL (ref 4–11)

## 2020-11-25 PROCEDURE — 94761 N-INVAS EAR/PLS OXIMETRY MLT: CPT

## 2020-11-25 PROCEDURE — 85025 COMPLETE CBC W/AUTO DIFF WBC: CPT

## 2020-11-25 PROCEDURE — 6360000002 HC RX W HCPCS: Performed by: FAMILY MEDICINE

## 2020-11-25 PROCEDURE — 6370000000 HC RX 637 (ALT 250 FOR IP): Performed by: HOSPITALIST

## 2020-11-25 PROCEDURE — 2580000003 HC RX 258: Performed by: HOSPITALIST

## 2020-11-25 PROCEDURE — 6360000002 HC RX W HCPCS: Performed by: HOSPITALIST

## 2020-11-25 PROCEDURE — 2500000003 HC RX 250 WO HCPCS: Performed by: INTERNAL MEDICINE

## 2020-11-25 PROCEDURE — 6370000000 HC RX 637 (ALT 250 FOR IP): Performed by: INTERNAL MEDICINE

## 2020-11-25 PROCEDURE — 80048 BASIC METABOLIC PNL TOTAL CA: CPT

## 2020-11-25 PROCEDURE — 1200000000 HC SEMI PRIVATE

## 2020-11-25 PROCEDURE — 2700000000 HC OXYGEN THERAPY PER DAY

## 2020-11-25 PROCEDURE — 99233 SBSQ HOSP IP/OBS HIGH 50: CPT | Performed by: INTERNAL MEDICINE

## 2020-11-25 PROCEDURE — 36415 COLL VENOUS BLD VENIPUNCTURE: CPT

## 2020-11-25 PROCEDURE — 2580000003 HC RX 258: Performed by: INTERNAL MEDICINE

## 2020-11-25 PROCEDURE — 80076 HEPATIC FUNCTION PANEL: CPT

## 2020-11-25 PROCEDURE — 6360000002 HC RX W HCPCS: Performed by: INTERNAL MEDICINE

## 2020-11-25 RX ORDER — INSULIN GLARGINE 100 [IU]/ML
15 INJECTION, SOLUTION SUBCUTANEOUS 2 TIMES DAILY
Status: DISCONTINUED | OUTPATIENT
Start: 2020-11-25 | End: 2020-11-26 | Stop reason: HOSPADM

## 2020-11-25 RX ADMIN — CEFTRIAXONE SODIUM 1 G: 1 INJECTION, POWDER, FOR SOLUTION INTRAMUSCULAR; INTRAVENOUS at 11:46

## 2020-11-25 RX ADMIN — REMDESIVIR 100 MG: 100 INJECTION, POWDER, LYOPHILIZED, FOR SOLUTION INTRAVENOUS at 15:55

## 2020-11-25 RX ADMIN — METOPROLOL TARTRATE 25 MG: 25 TABLET, FILM COATED ORAL at 09:22

## 2020-11-25 RX ADMIN — ATORVASTATIN CALCIUM 20 MG: 10 TABLET, FILM COATED ORAL at 09:22

## 2020-11-25 RX ADMIN — ASPIRIN 81 MG CHEWABLE TABLET 81 MG: 81 TABLET CHEWABLE at 09:22

## 2020-11-25 RX ADMIN — INSULIN LISPRO 1 UNITS: 100 INJECTION, SOLUTION INTRAVENOUS; SUBCUTANEOUS at 09:36

## 2020-11-25 RX ADMIN — INSULIN GLARGINE 15 UNITS: 100 INJECTION, SOLUTION SUBCUTANEOUS at 15:20

## 2020-11-25 RX ADMIN — INSULIN LISPRO 5 UNITS: 100 INJECTION, SOLUTION INTRAVENOUS; SUBCUTANEOUS at 11:51

## 2020-11-25 RX ADMIN — PREGABALIN 200 MG: 100 CAPSULE ORAL at 09:22

## 2020-11-25 RX ADMIN — PREGABALIN 200 MG: 100 CAPSULE ORAL at 21:14

## 2020-11-25 RX ADMIN — INSULIN LISPRO 3 UNITS: 100 INJECTION, SOLUTION INTRAVENOUS; SUBCUTANEOUS at 17:20

## 2020-11-25 RX ADMIN — TRAMADOL HYDROCHLORIDE 100 MG: 50 TABLET, FILM COATED ORAL at 17:20

## 2020-11-25 RX ADMIN — METOPROLOL TARTRATE 25 MG: 25 TABLET, FILM COATED ORAL at 21:11

## 2020-11-25 RX ADMIN — DEXAMETHASONE SODIUM PHOSPHATE 6 MG: 10 INJECTION, SOLUTION INTRAMUSCULAR; INTRAVENOUS at 04:43

## 2020-11-25 RX ADMIN — ENOXAPARIN SODIUM 30 MG: 30 INJECTION SUBCUTANEOUS at 21:11

## 2020-11-25 RX ADMIN — DEXTROSE MONOHYDRATE 500 MG: 50 INJECTION, SOLUTION INTRAVENOUS at 09:28

## 2020-11-25 RX ADMIN — INSULIN LISPRO 2 UNITS: 100 INJECTION, SOLUTION INTRAVENOUS; SUBCUTANEOUS at 21:15

## 2020-11-25 RX ADMIN — TRAMADOL HYDROCHLORIDE 100 MG: 50 TABLET, FILM COATED ORAL at 09:28

## 2020-11-25 RX ADMIN — Medication 1000 UNITS: at 09:22

## 2020-11-25 RX ADMIN — INSULIN GLARGINE 15 UNITS: 100 INJECTION, SOLUTION SUBCUTANEOUS at 21:15

## 2020-11-25 RX ADMIN — Medication 10 ML: at 09:24

## 2020-11-25 RX ADMIN — ENOXAPARIN SODIUM 30 MG: 30 INJECTION SUBCUTANEOUS at 09:21

## 2020-11-25 ASSESSMENT — PAIN SCALES - GENERAL
PAINLEVEL_OUTOF10: 9
PAINLEVEL_OUTOF10: 9
PAINLEVEL_OUTOF10: 4
PAINLEVEL_OUTOF10: 3
PAINLEVEL_OUTOF10: 3
PAINLEVEL_OUTOF10: 9

## 2020-11-25 NOTE — FLOWSHEET NOTE
11/25/20 0806   Vital Signs   Temp 97.8 °F (36.6 °C)   Temp Source Oral   Pulse 55   Heart Rate Source Monitor   Resp 16   /76   BP Location Left upper arm   BP Upper/Lower Upper   Patient Position Semi fowlers   Level of Consciousness 0   MEWS Score 1   Patient Currently in Pain Yes   Pain Assessment   Pain Assessment 0-10   Pain Level 9     Pt VS stable, pt reports chronic back pain of a 9. Writer administered PRN Tramadol 100mg. Pt has no other complaints at this time. Call light in reach.  Deborah Titus RN

## 2020-11-25 NOTE — FLOWSHEET NOTE
11/24/20 1925   Vital Signs   Temp 97 °F (36.1 °C)   Temp Source Oral   Pulse 57   Heart Rate Source Monitor   Resp 18   BP (!) 169/75   BP Location Left upper arm   BP Upper/Lower Upper   Patient Position Semi fowlers   Level of Consciousness 0   MEWS Score 1   Oxygen Therapy   SpO2 95 %   O2 Device Nasal cannula   O2 Flow Rate (L/min) 3 L/min   Ultram 2 po given to pt for c/o chronic back pain 8/10. Pt awake/alert and oriented times 4. No acute distress.  Almaz Lunsford

## 2020-11-25 NOTE — FLOWSHEET NOTE
11/25/20 0215   Vital Signs   Temp 97.5 °F (36.4 °C)   Temp Source Oral   Pulse 54   Heart Rate Source Monitor   Resp 16   /65   BP Location Left upper arm   BP Upper/Lower Upper   Patient Position Semi fowlers   Level of Consciousness 0   MEWS Score 1   Oxygen Therapy   SpO2 97 %   O2 Device Nasal cannula   O2 Flow Rate (L/min) 3 L/min   Pt resting in bed, no acute distress. Resp easy/even.  Mega Mcgill

## 2020-11-25 NOTE — PROGRESS NOTES
CARDIOLOGY PROGRESS NOTE      Patient Name: Navneet Milton  Date of admission: 11/19/2020 11:03 PM  Admission Dx: Pneumonia due to COVID-19 virus [U07.1, J12.89]  Reason for Consult:  NSVT, bradycardia  Requesting Physician: Cassius Fletcher MD  Primary Care physician: Krupa Newsome    Subjective:     Navneet Milton is a 65 y.o. patient with prior history notable for cerebrovascular accident, hyperlipidemia, hypertension, diabetes type 2 and peripheral artery disease/carotid disease status post carotid endarterectomy 8/2018, no known cardiac disease, who presented to the hospital with complaints of shortness of breath, fatigue, cough, fever and was ultimately diagnosed with COVID-19. While in the ICU receiving treatment for COVID-19 it was noted that the patient was having runs of nonsustained ventricular tachycardia. Cardiology was consulted for further evaluation and management. Started on low dose metoprolol BID. In discussion with bedside nurse today, he remains on 3L oxygen therapy and has no complaints. No chest pain, dyspnea, volume overload. Tolerating medications. HR's 55 presently, high 40's overnight with sleep. Asymptomatic. Home Medications:  Were reviewed and are listed in nursing record and/or below  Prior to Admission medications    Medication Sig Start Date End Date Taking? Authorizing Provider   tamsulosin (FLOMAX) 0.4 MG capsule Take 0.4 mg by mouth nightly   Yes Historical Provider, MD   traMADol (ULTRAM) 50 MG tablet Take 50 mg by mouth every 8 hours as needed for Pain.  1 to 2 tablets for pain   Yes Historical Provider, MD   aspirin 81 MG chewable tablet Take 1 tablet by mouth daily 5/24/18   Zeyad Mercedes MD   docusate sodium (COLACE) 100 MG capsule Take 1 capsule by mouth daily 5/12/18   Radha Sarabia MD   atorvastatin (LIPITOR) 10 MG tablet Take 20 mg by mouth daily    Historical Provider, MD   metoprolol tartrate (LOPRESSOR) 25 MG tablet Take 12.5 mg by mouth 2 times daily    Historical Provider, MD   insulin glargine (LANTUS) 100 UNIT/ML injection Inject 10 Units into the skin nightly. Patient taking differently: Inject 20 Units into the skin 2 times daily  6/1/10 8/27/18  Gallo Correa MD   pregabalin (LYRICA) 100 MG capsule Take 200 mg by mouth 2 times daily. 3/18/10   Historical Provider, MD        CURRENT Medications:  metoprolol tartrate (LOPRESSOR) tablet 25 mg, BID  cefTRIAXone (ROCEPHIN) 1 g IVPB in 50 mL D5W minibag, Q24H  Vitamin D (CHOLECALCIFEROL) tablet 1,000 Units, Daily  remdesivir 100 mg in sodium chloride 0.9 % 250 mL IVPB, Q24H  traMADol (ULTRAM) tablet 50 mg, TID PRN    Or  traMADol (ULTRAM) tablet 100 mg, TID PRN  pregabalin (LYRICA) capsule 200 mg, BID  azithromycin (ZITHROMAX) 500 mg in D5W 250ml addavial, Q24H  aspirin chewable tablet 81 mg, Daily  atorvastatin (LIPITOR) tablet 20 mg, Daily  glucose (GLUTOSE) 40 % oral gel 15 g, PRN  dextrose 50 % IV solution, PRN  glucagon (rDNA) injection 1 mg, PRN  dextrose 5 % solution, PRN  insulin lispro (HUMALOG) injection vial 0-6 Units, TID WC  insulin lispro (HUMALOG) injection vial 0-3 Units, Nightly  sodium chloride flush 0.9 % injection 10 mL, 2 times per day  sodium chloride flush 0.9 % injection 10 mL, PRN  acetaminophen (TYLENOL) tablet 650 mg, Q6H PRN    Or  acetaminophen (TYLENOL) suppository 650 mg, Q6H PRN  polyethylene glycol (GLYCOLAX) packet 17 g, Daily PRN  promethazine (PHENERGAN) tablet 12.5 mg, Q6H PRN    Or  ondansetron (ZOFRAN) injection 4 mg, Q6H PRN  dexamethasone (PF) (DECADRON) injection 6 mg, Q24H  enoxaparin (LOVENOX) injection 30 mg, BID  insulin glargine (LANTUS) injection vial 15 Units, Nightly        Allergies:  Lisinopril     Review of Systems:   A 14 point review of symptoms completed. Pertinent positives identified in the HPI, all other review of symptoms negative.        Objective:     Vitals:    11/24/20 1615 11/24/20 1925 11/25/20 0215 11/25/20 0806   BP: (!) 169/76 (!) 169/75 112/65 132/76   Pulse: 52 57 54 55   Resp: 18 18 16 16   Temp: 97.7 °F (36.5 °C) 97 °F (36.1 °C) 97.5 °F (36.4 °C) 97.8 °F (36.6 °C)   TempSrc: Oral Oral Oral Oral   SpO2: 100% 95% 97% 91%   Weight:       Height:          Weight: 170 lb (77.1 kg)       PHYSICAL EXAM:    Due to the current efforts to prevent transmission of COVID-19 and also the need to preserve PPE for other caregivers, a face-to-face encounter with the patient was not performed. That being said, all relevant records and diagnostic tests were reviewed, including laboratory results and imaging. Please reference any relevant documentation elsewhere. Care will be coordinated with the primary service.          Labs:   CBC:   Lab Results   Component Value Date    WBC 4.2 11/25/2020    RBC 3.46 11/25/2020    HGB 11.4 11/25/2020    HCT 33.3 11/25/2020    MCV 96.2 11/25/2020    RDW 13.3 11/25/2020     11/25/2020     CMP:  Lab Results   Component Value Date     11/25/2020    K 4.1 11/25/2020    K 4.0 11/21/2020     11/25/2020    CO2 24 11/25/2020    BUN 46 11/25/2020    CREATININE 1.4 11/25/2020    GFRAA >60 11/25/2020    GFRAA >60 03/01/2010    AGRATIO 1.3 11/20/2020    LABGLOM 50 11/25/2020    GLUCOSE 164 11/25/2020    PROT 6.1 11/25/2020    CALCIUM 9.1 11/25/2020    BILITOT 0.3 11/25/2020    ALKPHOS 93 11/25/2020    AST 24 11/25/2020    ALT 17 11/25/2020     PT/INR:  No results found for: PTINR  HgBA1c:  Lab Results   Component Value Date    LABA1C 8.3 11/23/2020     Lab Results   Component Value Date    TROPONINI <0.01 11/23/2020         Interval Testing/Data:     CTA head and neck 2/16/20   Mild atherosclerotic change and narrowing at the proximal right internal carotid artery, unchanged  No acute abnormality in the neck  Left lung apical opacification as described.  This is very similar compared to the prior allowing for differences in technique.  Slight increase in the left distal vertebral artery narrowing.  Multifocal significant narrowing again noted involving the left posterior cerebral artery.  There is mild additional narrowing involving the right posterior cerebral artery, unchanged.  Multifocal cavernous carotid segment narrowing, left greater than right. This is stable.  No new areas of abnormal intracranial narrowing are noted otherwise.         ECHO 5/22/18   Summary   Normal left ventricle size, wall thickness, and systolic function with an   estimated ejection fraction of 55-60%.   No regional wall motion abnormalities are seen.   Diastolic filling parameters suggests normal diastolic function.   Mild mitral regurgitation is present.   Mild tricuspid regurgitation.   Estimated pulmonary artery systolic pressure is [id number] assuming a right   atrial pressure of 3mmHg.   The left atrium is dilated.   A bubble study was performed and fails to show evidence of right to left   shunting. Impression and Plan   Devin Carcamo is a 65 y.o. patient with prior history notable for cerebrovascular accident, hyperlipidemia, hypertension, diabetes type 2 and peripheral artery disease/carotid disease status post carotid endarterectomy 8/2018, no known cardiac disease, who presented to the hospital with complaints of shortness of breath, fatigue, cough, fever and was ultimately diagnosed with COVID-19.       1. NSVT - likely due to acute illness. Consider underlying ischemic heart diseaseasymptomatic. No events last 24 hrs by tele. 2. Acute hypoxic respiratory failure, remains on 3L O2.   3. COVID-19 pneumonia on treatment. 4.  Bradycardia - asymptomatic. Continue metoprolol. 5.  Hypertension- controlled  6. Hyperlipidemia  7. Diabetes  8. Carotid disease status post prior carotid endarterectomy  9. Echo from 2018 reviewed - normal LVEF with no significant valvular disease.     Plan:  1. Continue to Monitor rhythms by telemetry. Sinus jennyfer with no recurrence of VT, continue low dose metoprolol.    2.  Monitor labs and replace K and Mg aggressively to maintain K>4, Mg>2   3. Continue baby ASA, statin  4.  BP's near goal.       We will plan to obtain transthoracic echocardiogram and stress test once the patient recovers and completes quarantine following diagnosis of COVID-19. Should the patient have symptoms/signs concerning for acute ischemia, this may change need from outpatient to inpatient evaluation. We will sign off with plans for outpatient re-evaluation. Should he have signs/symptoms consistent with ischemia and/or recurrence of salvos of VT, please call for re-evaluation on IP basis.          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 In addition, I have reinforced the need for patient directed risk factor modification. All questions and concerns were addressed to the patient/family. Alternatives to my treatment were discussed. Thank you for allowing us to participate in the care of Jazmyn Johnson. Please call me with any questions [phone].     Joey Celestin MD   Cardiovascular Disease  [address]  [phone] [facility]  [phone] [address] [place] [address]  11/25/2020 11:20 AM

## 2020-11-25 NOTE — CARE COORDINATION
INTERDISCIPLINARY PLAN OF CARE CONFERENCE    Date/Time: 11/25/2020 10:32 AM  Completed by: Jens Muller Case Management      Patient Name:  Evelyne Arias  YOB: 1950  Admitting Diagnosis: Pneumonia due to COVID-19 virus [U07.1, J12.89]     Admit Date/Time:  11/19/2020 11:03 PM    Chart reviewed. Interdisciplinary team contacted or reviewed plan related to patient progress and discharge plans. Disciplines included Case Management, Nursing, and Dietitian. Current Status:stable on 3L  PT/OT recommendation for discharge plan of care: NA    Expected D/C Disposition:  Home  Confirmed plan with patient and/or family pt   Discharge Plan Comments: Chart reviewed. Plan continues for pt to return home with spouse.  Will follow for poss HHC/O2 needs at [place].     Home O2 in place on admit: No

## 2020-11-26 VITALS
HEIGHT: 68 IN | OXYGEN SATURATION: 91 % | RESPIRATION RATE: 18 BRPM | SYSTOLIC BLOOD PRESSURE: 103 MMHG | WEIGHT: 170 LBS | BODY MASS INDEX: 25.76 KG/M2 | HEART RATE: 64 BPM | TEMPERATURE: 98.1 F | DIASTOLIC BLOOD PRESSURE: 57 MMHG

## 2020-11-26 LAB
ALBUMIN SERPL-MCNC: 2.5 G/DL (ref 3.4–5)
ALP BLD-CCNC: 94 U/L (ref 40–129)
ALT SERPL-CCNC: 14 U/L (ref 10–40)
AST SERPL-CCNC: 21 U/L (ref 15–37)
BASOPHILS ABSOLUTE: 0 K/UL (ref 0–0.2)
BASOPHILS RELATIVE PERCENT: 0.2 %
BILIRUB SERPL-MCNC: 0.3 MG/DL (ref 0–1)
BILIRUBIN DIRECT: <0.2 MG/DL (ref 0–0.3)
BILIRUBIN, INDIRECT: ABNORMAL MG/DL (ref 0–1)
EOSINOPHILS ABSOLUTE: 0 K/UL (ref 0–0.6)
EOSINOPHILS RELATIVE PERCENT: 0.3 %
GLUCOSE BLD-MCNC: 142 MG/DL (ref 70–99)
GLUCOSE BLD-MCNC: 217 MG/DL (ref 70–99)
GLUCOSE BLD-MCNC: 233 MG/DL (ref 70–99)
HCT VFR BLD CALC: 34.8 % (ref 40.5–52.5)
HEMOGLOBIN: 11.9 G/DL (ref 13.5–17.5)
LYMPHOCYTES ABSOLUTE: 0.5 K/UL (ref 1–5.1)
LYMPHOCYTES RELATIVE PERCENT: 10.6 %
MCH RBC QN AUTO: 32.7 PG (ref 26–34)
MCHC RBC AUTO-ENTMCNC: 34.3 G/DL (ref 31–36)
MCV RBC AUTO: 95.5 FL (ref 80–100)
MONOCYTES ABSOLUTE: 0.1 K/UL (ref 0–1.3)
MONOCYTES RELATIVE PERCENT: 2.1 %
NEUTROPHILS ABSOLUTE: 4.2 K/UL (ref 1.7–7.7)
NEUTROPHILS RELATIVE PERCENT: 86.8 %
PDW BLD-RTO: 13.3 % (ref 12.4–15.4)
PERFORMED ON: ABNORMAL
PLATELET # BLD: 146 K/UL (ref 135–450)
PMV BLD AUTO: 7.5 FL (ref 5–10.5)
RBC # BLD: 3.64 M/UL (ref 4.2–5.9)
TOTAL PROTEIN: 5.9 G/DL (ref 6.4–8.2)
WBC # BLD: 4.8 K/UL (ref 4–11)

## 2020-11-26 PROCEDURE — 6370000000 HC RX 637 (ALT 250 FOR IP): Performed by: HOSPITALIST

## 2020-11-26 PROCEDURE — 6360000002 HC RX W HCPCS: Performed by: INTERNAL MEDICINE

## 2020-11-26 PROCEDURE — 97161 PT EVAL LOW COMPLEX 20 MIN: CPT

## 2020-11-26 PROCEDURE — 2580000003 HC RX 258: Performed by: HOSPITALIST

## 2020-11-26 PROCEDURE — 6370000000 HC RX 637 (ALT 250 FOR IP): Performed by: INTERNAL MEDICINE

## 2020-11-26 PROCEDURE — 97116 GAIT TRAINING THERAPY: CPT

## 2020-11-26 PROCEDURE — 6360000002 HC RX W HCPCS: Performed by: HOSPITALIST

## 2020-11-26 PROCEDURE — 99233 SBSQ HOSP IP/OBS HIGH 50: CPT | Performed by: INTERNAL MEDICINE

## 2020-11-26 PROCEDURE — 97530 THERAPEUTIC ACTIVITIES: CPT

## 2020-11-26 PROCEDURE — 97165 OT EVAL LOW COMPLEX 30 MIN: CPT

## 2020-11-26 PROCEDURE — 36415 COLL VENOUS BLD VENIPUNCTURE: CPT

## 2020-11-26 PROCEDURE — 80076 HEPATIC FUNCTION PANEL: CPT

## 2020-11-26 PROCEDURE — 6360000002 HC RX W HCPCS: Performed by: FAMILY MEDICINE

## 2020-11-26 PROCEDURE — 85025 COMPLETE CBC W/AUTO DIFF WBC: CPT

## 2020-11-26 RX ORDER — LEVOFLOXACIN 500 MG/1
500 TABLET, FILM COATED ORAL DAILY
Qty: 5 TABLET | Refills: 0 | Status: SHIPPED | OUTPATIENT
Start: 2020-11-26 | End: 2020-11-26 | Stop reason: SDUPTHER

## 2020-11-26 RX ORDER — DEXAMETHASONE 2 MG/1
10 TABLET ORAL
Qty: 25 TABLET | Refills: 0 | Status: SHIPPED | OUTPATIENT
Start: 2020-11-26 | End: 2020-11-26 | Stop reason: SDUPTHER

## 2020-11-26 RX ORDER — DEXAMETHASONE 2 MG/1
10 TABLET ORAL
Qty: 25 TABLET | Refills: 0 | Status: SHIPPED | OUTPATIENT
Start: 2020-11-26 | End: 2020-12-01

## 2020-11-26 RX ORDER — LEVOFLOXACIN 500 MG/1
500 TABLET, FILM COATED ORAL DAILY
Qty: 5 TABLET | Refills: 0 | Status: SHIPPED | OUTPATIENT
Start: 2020-11-26 | End: 2020-12-01

## 2020-11-26 RX ORDER — TAMSULOSIN HYDROCHLORIDE 0.4 MG/1
0.4 CAPSULE ORAL DAILY
Status: DISCONTINUED | OUTPATIENT
Start: 2020-11-26 | End: 2020-11-26 | Stop reason: HOSPADM

## 2020-11-26 RX ADMIN — Medication 10 ML: at 09:55

## 2020-11-26 RX ADMIN — ENOXAPARIN SODIUM 30 MG: 30 INJECTION SUBCUTANEOUS at 09:54

## 2020-11-26 RX ADMIN — INSULIN LISPRO 2 UNITS: 100 INJECTION, SOLUTION INTRAVENOUS; SUBCUTANEOUS at 13:00

## 2020-11-26 RX ADMIN — INSULIN LISPRO 2 UNITS: 100 INJECTION, SOLUTION INTRAVENOUS; SUBCUTANEOUS at 09:59

## 2020-11-26 RX ADMIN — TAMSULOSIN HYDROCHLORIDE 0.4 MG: 0.4 CAPSULE ORAL at 13:00

## 2020-11-26 RX ADMIN — DEXAMETHASONE SODIUM PHOSPHATE 6 MG: 10 INJECTION, SOLUTION INTRAMUSCULAR; INTRAVENOUS at 05:03

## 2020-11-26 RX ADMIN — TRAMADOL HYDROCHLORIDE 100 MG: 50 TABLET, FILM COATED ORAL at 09:58

## 2020-11-26 RX ADMIN — ATORVASTATIN CALCIUM 20 MG: 10 TABLET, FILM COATED ORAL at 09:53

## 2020-11-26 RX ADMIN — DEXTROSE MONOHYDRATE 500 MG: 50 INJECTION, SOLUTION INTRAVENOUS at 09:57

## 2020-11-26 RX ADMIN — PREGABALIN 200 MG: 100 CAPSULE ORAL at 09:53

## 2020-11-26 RX ADMIN — Medication 1000 UNITS: at 09:53

## 2020-11-26 RX ADMIN — TRAMADOL HYDROCHLORIDE 100 MG: 50 TABLET, FILM COATED ORAL at 01:14

## 2020-11-26 RX ADMIN — ASPIRIN 81 MG CHEWABLE TABLET 81 MG: 81 TABLET CHEWABLE at 09:53

## 2020-11-26 RX ADMIN — INSULIN GLARGINE 15 UNITS: 100 INJECTION, SOLUTION SUBCUTANEOUS at 09:59

## 2020-11-26 ASSESSMENT — PAIN DESCRIPTION - DESCRIPTORS: DESCRIPTORS: ACHING

## 2020-11-26 ASSESSMENT — PAIN SCALES - GENERAL
PAINLEVEL_OUTOF10: 8
PAINLEVEL_OUTOF10: 8

## 2020-11-26 ASSESSMENT — PAIN DESCRIPTION - LOCATION: LOCATION: BACK

## 2020-11-26 ASSESSMENT — PAIN DESCRIPTION - PAIN TYPE: TYPE: CHRONIC PAIN

## 2020-11-26 ASSESSMENT — PAIN - FUNCTIONAL ASSESSMENT: PAIN_FUNCTIONAL_ASSESSMENT: PREVENTS OR INTERFERES SOME ACTIVE ACTIVITIES AND ADLS

## 2020-11-26 ASSESSMENT — PAIN DESCRIPTION - FREQUENCY: FREQUENCY: CONTINUOUS

## 2020-11-26 ASSESSMENT — PAIN DESCRIPTION - PROGRESSION: CLINICAL_PROGRESSION: NOT CHANGED

## 2020-11-26 ASSESSMENT — PAIN DESCRIPTION - ONSET: ONSET: ON-GOING

## 2020-11-26 NOTE — PROGRESS NOTES
Pulmonary Progress Note  CC: COVID 19 pneumonia    Subjective:        EXAM: /72   Pulse 54   Temp 97.8 °F (36.6 °C) (Oral)   Resp 18   Ht 5' 8\" (1.727 m)   Wt 170 lb (77.1 kg)   SpO2 (!) 88%   BMI 25.85 kg/m²  on 3lpm  Constitutional:  No acute distress. Eyes: PERRL. Conjunctivae anicteric. ENT: Normal nose. Normal tongue. Neck:  Trachea is midline. No thyroid tenderness. Respiratory: No accessory muscle usage. No wheezes. No rales. No Rhonchi. Cardiovascular: Normal S1S2. No digit clubbing. No digit cyanosis. No LE edema. Psychiatric: No anxiety or Agitation. Alert and Oriented to person, place and time.     Scheduled Meds:   tamsulosin  0.4 mg Oral Daily    insulin glargine  15 Units Subcutaneous BID    metoprolol tartrate  25 mg Oral BID    cefTRIAXone (ROCEPHIN) IV  1 g Intravenous Q24H    Vitamin D  1,000 Units Oral Daily    remdesivir IVPB  100 mg Intravenous Q24H    pregabalin  200 mg Oral BID    azithromycin  500 mg Intravenous Q24H    aspirin  81 mg Oral Daily    atorvastatin  20 mg Oral Daily    insulin lispro  0-6 Units Subcutaneous TID WC    insulin lispro  0-3 Units Subcutaneous Nightly    sodium chloride flush  10 mL Intravenous 2 times per day    dexamethasone  6 mg Intravenous Q24H    enoxaparin  30 mg Subcutaneous BID     Continuous Infusions:   dextrose       PRN Meds:  traMADol **OR** traMADol, glucose, dextrose, glucagon (rDNA), dextrose, sodium chloride flush, acetaminophen **OR** acetaminophen, polyethylene glycol, promethazine **OR** ondansetron    Labs:  CBC:   Recent Labs     11/24/20  0533 11/25/20  0552 11/26/20  0624   WBC 3.4* 4.2 4.8   HGB 12.0* 11.4* 11.9*   HCT 35.3* 33.3* 34.8*   MCV 96.4 96.2 95.5   * 160 146     BMP:   Recent Labs     11/25/20  0552      K 4.1      CO2 24   BUN 46*   CREATININE 1.4*       Cultures:  11/20/20 COVID 19 +  PCT 0.70     Chest imaging was reviewed by me and showed 11/20/20  Poor delineation of the left hemidiaphragm suggesting left basilar airspace    disease and/or a small left pleural effusion.         ASSESSMENT:  · Covid 19 Pneumonia  · Acute hypoxic respiratory failure  · Possible secondary PNA given elevated PCT  · Pancytopenia -improved  · CKD  · CAD  · DM2     PLAN:  · Droplet + precautions  · Supplemental oxygen to maintain SaO2 >92%; wean as tolerated   · Decadron D#7  · Remdesivir D#5  · CTXD#6, completedAzithro D#5  · Lovenox BID  · Consider d/c with home o2

## 2020-11-26 NOTE — PROGRESS NOTES
Hospitalist Progress Note      PCP: Nano Carrillo    Date of Admission: 11/19/2020    Subjective: SOB better, still needing 3L O2    Medications:  Reviewed    Infusion Medications    dextrose       Scheduled Medications    tamsulosin  0.4 mg Oral Daily    insulin glargine  15 Units Subcutaneous BID    metoprolol tartrate  25 mg Oral BID    cefTRIAXone (ROCEPHIN) IV  1 g Intravenous Q24H    Vitamin D  1,000 Units Oral Daily    remdesivir IVPB  100 mg Intravenous Q24H    pregabalin  200 mg Oral BID    azithromycin  500 mg Intravenous Q24H    aspirin  81 mg Oral Daily    atorvastatin  20 mg Oral Daily    insulin lispro  0-6 Units Subcutaneous TID WC    insulin lispro  0-3 Units Subcutaneous Nightly    sodium chloride flush  10 mL Intravenous 2 times per day    dexamethasone  6 mg Intravenous Q24H    enoxaparin  30 mg Subcutaneous BID     PRN Meds: traMADol **OR** traMADol, glucose, dextrose, glucagon (rDNA), dextrose, sodium chloride flush, acetaminophen **OR** acetaminophen, polyethylene glycol, promethazine **OR** ondansetron      Intake/Output Summary (Last 24 hours) at 11/26/2020 1122  Last data filed at 11/25/2020 2230  Gross per 24 hour   Intake 602 ml   Output 1175 ml   Net -573 ml       Physical Exam Performed:    /72   Pulse 54   Temp 97.8 °F (36.6 °C) (Oral)   Resp 18   Ht 5' 8\" (1.727 m)   Wt 170 lb (77.1 kg)   SpO2 (!) 88%   BMI 25.85 kg/m²     General: Awake, oriented  HEENT: Pupils round, reacting to light  Heart: S1 S2 heard, no murmurs  Lung: diminished b/l  Abd: Soft, not distended, not tender, BS +  Extr: No cyanosis or clubbing  Neuro: No focal deficits.     Labs:   Recent Labs     11/24/20 0533 11/25/20  0552 11/26/20  0624   WBC 3.4* 4.2 4.8   HGB 12.0* 11.4* 11.9*   HCT 35.3* 33.3* 34.8*   * 160 146     Recent Labs     11/25/20  0552      K 4.1      CO2 24   BUN 46*   CREATININE 1.4*   CALCIUM 9.1     Recent Labs     11/24/20  0533 11/25/20  0552 11/26/20  0624   AST 33 24 21   ALT 21 17 14   BILIDIR <0.2 <0.2 <0.2   BILITOT 0.3 0.3 0.3   ALKPHOS 93 93 94     No results for input(s): INR in the last 72 hours. No results for input(s): Rhoda Belts in the last 72 hours. Urinalysis:      Lab Results   Component Value Date    NITRU Negative 02/16/2020    WBCUA 0-2 05/22/2018    RBCUA 0-2 05/22/2018    BLOODU Negative 02/16/2020    SPECGRAV 1.015 02/16/2020    GLUCOSEU >=1000 02/16/2020       Radiology:  XR CHEST PORTABLE   Final Result   Poor delineation of the left hemidiaphragm suggesting left basilar airspace   disease and/or a small left pleural effusion. Assessment/Plan:    Active Hospital Problems    Diagnosis    Acute respiratory failure with hypoxia ([facility] [address] [J96.01]    Pneumonia due to COVID-19 virus [U07.1, J12.89]    Generalized weakness [R53.1]    COVID-19 [U07.1]    Stage 3a chronic kidney disease [N18.31]         Acute respiratory failure due to COVID-19 pneumonia. Continue oxygen-now on 3 L  Decadron D 6  remdesvir  D 4/5  Continue azithromycin and Rocephin day 6     CKD,stage 3   Stable.        Nonsustained V. tach. Serum potassium is normal.  Magnesium level checked and is normal.  Cardiology consult. Continue aspirin, statin and beta-blockers  Stress Myoview once recovered from acute Covid infection.     Pancytopenia? b12 iron  Ok     DM2   Blood sugar readings high.   Sliding scale insulin        DVT Prophylaxis: lov  Diet: DIET CARB CONTROL;  Code Status: Full Code    PT/OT Eval Status: ordered    Dispo - poss dc in am after Rosalia Brice MD

## 2020-11-26 NOTE — PROGRESS NOTES
IV removed. Patient verbalized understanding of discharge instructions. Patient denies needs, has call light within reach is awaiting his ride. Patient to call out on call light when his ride gets here.

## 2020-11-26 NOTE — DISCHARGE INSTR - COC
Continuity of Care Form    Patient Name: Lasha Phillips   :  1950  MRN:  [id number]    Admit date:  2020  Discharge date:  2020    Code Status Order: Full Code   Advance Directives:   Advance Care Flowsheet Documentation     Date/Time Healthcare Directive Type of Healthcare Directive Copy in [address] Agent's Name Healthcare Agent's Phone Number    [phone]  Yes, patient has an advance directive for healthcare treatment  Living will  No, copy requested from family  --  --  --          Admitting Physician:  Amna Perea MD  PCP: Pedro Pablo Valadez    Discharging Nurse:  Edu Garcia Unit/Room#: 0229/0229-02  Discharging Unit Phone Number: [phone]    Emergency Contact:   Extended Emergency Contact Information  Primary Emergency Contact: Loraine Cortez  Address: [address] [place] [address] Phone: [phone]  Mobile Phone: [phone]  Relation: Spouse    Past Surgical History:  Past Surgical History:   Procedure Laterality Date    CATARACT REMOVAL WITH IMPLANT Right     CYSTOSCOPY      OH REOPER, CAROTID ENDARTEC>1 MON Left 2018    LEFT CAROTID ENDARTERECTOMY performed by Chris Guillen MD at [facility]      neg prostate biopsy     SHOULDER SURGERY      JOINT CLEANING    TURP         Immunization History:   Immunization History   Administered Date(s) Administered    Influenza Virus Vaccine 2009    Pneumococcal Polysaccharide (Hbrsuyeea26) 2007       Active Problems:  Patient Active Problem List   Diagnosis Code    Type II or unspecified type diabetes mellitus without mention of complication, not stated as uncontrolled E11.9    Chronic back pain M54.9, G89.29    HTN (hypertension), benign I10    Gout     Carotid artery occlusion I65.29    Cerebrovascular accident (CVA) ([address] I63.9    Intracranial hemorrhage ([address] I62.9 [[id number]]    Safety Concerns: At Risk for Falls    Impairments/Disabilities:      None    Nutrition Therapy:  Current Nutrition Therapy:   - Oral Diet:  Carb Control 4 carbs/meal (1800kcals/day)    Routes of Feeding: Oral  Liquids: Thin Liquids  Daily Fluid Restriction: no  Last Modified Barium Swallow with Video (Video Swallowing Test): not done    Treatments at the Time of Hospital Discharge:   Respiratory Treatments: none  Oxygen Therapy:  2 L of oxygen  Ventilator:    - No ventilator support    Rehab Therapies: Physical Therapy, Occupational Therapy and SN  Weight Bearing Status/Restrictions: No weight bearing restirctions  Other Medical Equipment (for information only, NOT a DME order):  none  Other Treatments: none    Patient's personal belongings (please select all that are sent with patient):  cell phone and clothing    RN SIGNATURE:  Electronically signed by Rodrigo Pablo RN on 20 at 2:27 PM EST    CASE MANAGEMENT/SOCIAL WORK SECTION    Inpatient Status Date: 2020    Readmission Risk Assessment Score:  Readmission Risk              Risk of Unplanned Readmission:        16           Discharging to Facility/ Agency   Name: [facility]  Address: [address] [place] [address],[address] [place] [address]   Phone: [phone]  Fax: [phone]    ·     / signature: Electronically signed by Annika Ivy RN on 20 at 1:29 PM EST    PHYSICIAN SECTION    Prognosis: {Prognosis:8876479481}    Condition at Discharge: [address] Patient Condition:[id number]}    Rehab Potential (if transferring to Rehab): {Prognosis:0292169352}    Recommended Labs or Other Treatments After Discharge: ***    Physician Certification: I certify the above information and transfer of Allan Mock  is necessary for the continuing treatment of the diagnosis listed and that he requires Home Care for less 30 days.      Update Admission H&P: [facility]    PHYSICIAN SIGNATURE:  Electronically signed by Aaron Oliver MD/Hoa Reyes Officer, RN on 11/26/20 at 1:30 PM EST

## 2020-11-26 NOTE — PROGRESS NOTES
Patient was 92% at rest on room air. Patient was 93% at rest on 2L. Patient was 87% ambulating on room air. Patient was 91% ambulating on 2L.

## 2020-11-26 NOTE — CARE COORDINATION
DISCHARGE ORDER  Date/Time 2020 12:56 PM  Completed by: Alissa Lopez, Case Management    Patient Name: Jerry Denney      : 1950  Admitting Diagnosis: Pneumonia due to COVID-19 virus [U07.1, J12.89]      Admit order Date and Status:2020   (verify MD's last order for status of admission)      Noted discharge order. If applicable PT/OT recommendation at Discharge: home with home PT/OT  DME recommendation by PT/OT:n/a  Confirmed discharge plan  (pt): Yes  with whom_______________pt  If pt confirmed DC plan does family need to be contacted by CM No if yes who___n/a___  Discharge Plan: Reviewed chart. Role of discharge planner explained and patient verbalized understanding. Discharge order is noted. Pt declines HHC. Pt lives at home with his wife. Pt is being d/c'd to home today. Pt's O2 sats are 88% on 3L. Per JOANNA Varner's note\" Patient was 92% at rest on room air. Patient was 93% at rest on 2L. Patient was 87% ambulating on room air. Patient was 91% ambulating on 2L. \"    JOANNA Varner to walk pt to inquire which amount of home O2 is needed for dx of Covid 19. Pt was given a DME Choice via phone (as pt is Covid pos) and he chose [facility].  called Erna Lars with [facility] ([phone]) and she states that she can accept pt and is setting up for when they will meet pt at home and she called pt's wife with his approval.      placed an O2 tank at is door  At hospital room and informed JOANNA Varner, to take into room when she goes back into room. JOANNA Varner, has taken this into his room. Pt states that Shruti has already call his wife for set. No further discharge needs needed or noted. Reviewed chart. Role of discharge planner explained and patient verbalized understanding. Discharge order is noted.     Has Home O2 in place on admit:  Pt is a new start for home O2 and a tank has been taken to his room  Informed of need to bring portable home O2 tank on day of discharge for nursing to connect prior to leaving:   Pt is a new start for home O2 and a tank has been taken to his room  Verbalized agreement/Understanding:   Yes  Pt is being d/c'd to home today. Pt's O2 sats are  on 91% on 2L. Discharge timeout done with Shy Abraham RN. All discharge needs and concerns addressed. Addendum 11/26/2020 1334; Pt states that he would like [facility] now. Pt states that he would like [facility]  For SN/PT/OT. CM spoke with Oh Waddell with [facility] ([phone]) and she can accept. Per Oh Waddell, she is pulling the info from the computer and does not need CM to fax anything to her. +HHC orders, +eCOC. Per Jaswant Crook with [facility], pt is able to leave now and they will meet pt at his home. Orders have been faxed to [facility] to fax #: [phone]. Pt has the O2 tank in his room. Shy Abraham RN is aware.

## 2020-11-26 NOTE — PROGRESS NOTES
Inpatient Physical Therapy Evaluation and Treatment    Unit: [facility]  Date:  11/26/2020  Patient Name:    Jerry Denney  Admitting diagnosis:  Pneumonia due to COVID-19 virus [U07.1, J12.89]  Admit Date:  11/19/2020  Precautions/Restrictions/WB Status/ Lines/ Wounds/ Oxygen: Fall risk, Bed/chair alarm, Lines -IV and Supplemental O2 (2L) and Isolation Precautions: Droplet Plus - COVID    Treatment Time:  7136-1345  Treatment Number: 1   Billable Treatment Time: 25 minutes   Total Treatment Time:   35   minutes    Patient Goals for Therapy: \" go home \"          Discharge Recommendations: Home with home PT   DME needs for discharge: Needs Met       Therapy recommendation for EMS Transport: can transport by wheelchair    Therapy recommendations for staff:   Stand by assist with use of rolling walker (RW) for all transfers and ambulation to/from bathroom  within room    History of Present Illness: H&PLuis, 11/23/2020:   \" The patient is a 64 y. o. male with DM2, history of right basal ganglia bleed, left carotid stenosis, HTN,with complaint of fatigue, cough, fever and hypoxia ,  covid + \"     Home Health S4 Level Recommendation:  Level 1 Standard  AM-PAC Mobility Score            Information obtained from OT/PT evaluation on 5/11/2018 and edited as needed. Preadmission Environment    Pt. Lives with spouse  Home environment:    two story home  Steps to enter first floor:   Ramp           Steps to second floor: Full flight of 12-13  Bathroom:       Walk-in Shower and built in shower seat  Equipment owned:      [facility], [employer] (RW), Rollator  and manual W/C      Preadmission Status / PLOF:  History of falls             No  Pt. Able to drive          Yes  Pt Fully independent with ADL's         Yes  Pt. Required assistance from family for: Independent PTA    Pt.  Fully independent for transfers and gait and walked with:  W/c in the house, Will use a RW except when the neuropathy is flair (related to pain)    Pain   Yes  Location: low back  Ratin /10  Pain Medicine Status: No request made    Cognition    A&O x4   Able to follow 2 step commands    Subjective  Patient lying supine in bed with no family present. Pt agreeable to this PT eval & tx. Upper Extremity ROM/Strength  Please see OT evaluation. Lower Extremity ROM / Strength   AROM WFL: Yes  ROM limitations: n./a    Strength Assessment (measured on a 0-5 scale): and BLE strength WFL, but not formally assessed with MMT. R LE   Quad   4   Ant Tib  4   Hamstring 4   Iliopsoas 4  L LE  Quad   4   Ant Tib  4   Hamstring 4   Iliopsoas 4    Lower Extremity Sensation    Impaired, pt reports that when he has neuropathy and reports that when he walks it feels like he is walking on glass     Lower Extremity Proprioception:   NT    Coordination and Tone  NT    Balance  Sitting:  Normal; Independent  Comments: able to put half of one sock on     Standing: Good ; Modified Independent  Comments: help with lines    Bed Mobility   Supine to Sit:    Independent  Sit to Supine:   Independent  Rolling:   Independent  Scooting in sitting: Independent  Scooting in supine:  Independent    Transfer Training     Sit to stand:   SBA  Stand to sit:    Independent  Bed to Chair:   SBA with use of gait belt and rolling walker (RW)    Gait gait completed as indicated below  Distance:      10 ft  Deviations (firm surface/linoleum):  shuffles and step through pattern  Assistive Device Used:    gait belt and rolling walker (RW)  Level of Assist:    SBA  Comment:     Stair Training deferred, pt unsafe/ not appropriate to complete stairs at this time  # of Steps:   N/A  Level of Assist:  N/A  UE Support:  NA  Assistive Device:  N/A  Pattern:   N/A  Comments:     Activity Tolerance    Pt completed therapy session with SpO2: 88% with transfer, Elevated to 96% with seated rest break on Room Air     Positioning Needs   Pt up in chair, no alarm needed, positioned in proper neutral alignment and pressure relief provided. Call light provided and all needs within reach    Exercises Initiated  Crista deferred secondary to treatment focus on functional mobility  NA    Other  None. Patient/Family Education   Pt educated on role of inpatient PT, POC, importance of continued activity, DC recommendations, safety awareness, transfer techniques, pursed lip breathing and calling for assist with mobility. Assessment  Pt seen for Physical Therapy evaluation in acute care setting. Pt demonstrated decreased Activity tolerance, ROM and Strength as well as decreased independence with Ambulation and Transfers. Recommending Home with home PT upon discharge as patient functioning close to baseline level    Goals : To be met in 3 visits:  1). Independent with LE Ex x 10 reps    To be met in 6 visits:  1). Supine to/from sit: Independent  2). Sit to/from stand: Independent  3). Bed to chair: Independent  4). Gait: Ambulate 150 ft.  with  Independent and use of gait belt and rolling walker (RW)  5). Tolerate B LE exercises 3 sets of 10-15 reps  6). Ascend/descend 4 steps with Modified Independent with use of hand rail bilateral and gait belt and rolling walker (RW)    Rehabilitation Potential: Good  Strengths for achieving goals include:   Pt motivated, PLOF and Family Support   Barriers to achieving goals include:    Pain and Weakness    Plan    To be seen 3-5 x / week  while in acute care setting for therapeutic exercises, bed mobility, transfers, progressive gait training, balance training, and family/patient education. Signature: Dominga Martinez, PT, DPT, ATC, cert-APHPT [id number]    If patient discharges from this facility prior to next visit, this note will serve as the Discharge Summary.

## 2020-11-26 NOTE — PROGRESS NOTES
Inpatient Occupational Therapy  Evaluation and Treatment    Unit: [facility]  Date:  2020  Patient Name:    Cameron Ibrahim  Admitting diagnosis:  Pneumonia due to COVID-19 virus [U07.1, J12.89]  Admit Date:  2020  Precautions/Restrictions/WB Status/ Lines/ Wounds/ Oxygen: Fall risk, Bed/chair alarm, Lines -IV and Supplemental O2 (2L) and Isolation Precautions: Droplet Plus - COVID    Treatment Time:  4809-7429  Treatment Number: 1     Billable Treatment Time: 25 minutes   Total Treatment Time:   35   minutes    Patient Goals for Therapy:  \" to go home \"       Discharge Recommendations: Home with PRN assist  DME needs for discharge: Needs Met       Therapy recommendations for staff:   Assist of 1 with use of rolling walker (RW) for all ambulation within room    History of Present Illness: H&PLuis, 2020:   \" The patient is a 64 y. o. male with DM2, history of right basal ganglia bleed, left carotid stenosis, HTN, with complaint of fatigue, cough, fever and hypoxia,  covid + \"     Home Health S4 Level Recommendation:  NA  AM-PAC Score: AM-PAC Inpatient Daily Activity Raw Score: 20    Information obtained from OT/PT evaluation on 2018 and edited as needed. Preadmission Environment    Pt. Lives with spouse  Home environment:  two story home  Steps to enter first floor:   Ramp     Steps to second floor: Full flight of 12-13  Bathroom:  Walk-in Shower and built in shower seat  Equipment owned:  [employer], [address] [place] [address] (), Rollator  and manual W/C     Preadmission Status / PLOF:  History of falls   No  Pt. Able to drive   Yes  Pt Fully independent with ADL's  Yes  Pt. Required assistance from family for: Independent PTA    Pt.  Fully independent for transfers and gait and walked with:  W/c in the house, Will use a RW except when the neuropathy is flair (related to pain)      Pain  Yes  Ratin  Location: low back (chronic)   Pain Medicine Status: No request made      Cognition    A&O Person, Place, Time and Situation   Able to follow 2 step commands    Subjective  Patient lying supine in bed with no family present - no visitors present due to COVID-19 restrictions  Pt agreeable to this OT eval & tx. Upper Extremity ROM:    WFL,  pt able to perform all bed mobility, transfers, and gait without ROM limitation. (LUE with residual impairment from past strokes.)     Upper Extremity Strength:    BUE strength WFL, but not formally assessed w/ MMT    Upper Extremity Sensation    Impaired on LUE     Upper Extremity Proprioception:  NT    Coordination and Tone  WFL    Balance  Functional Sitting Balance:  WFL  Functional Standing Balance:WFL    Bed mobility:    Supine to sit:   Supervision  Sit to supine:   Not Tested  Rolling:    Supervision  Scooting in sitting:  Supervision  Scooting to head of bed:   Not Tested    Bridging:   Independent    Transfers:    Sit to stand:  CGA and with use of RW  Stand to sit:  CGA and with use of RW  Bed to chair:   CGA and with use of RW  Standard toilet: Not Tested  Bed to [facility]:  Not Tested    Dressing:   UE:   Not Tested  LE:    Max A - Pt attempted to don socks. Limited 2/2 LUE residual stroke impairments. Bathing:    UE:  Not Tested  LE:  Not Tested    Eating:   Independent    Toileting:  Not Tested    Activity Tolerance   Pt completed therapy session with No adverse symptoms noted w/activity  SpO2: 88% with transfer, Elevated to 96% with seated rest break on Room Air     Positioning Needs:   Up in chair, call light and needs in reach. Alarm Set    Exercise / Activities Initiated:   N/A    Patient/Family Education:   Role of OT  Recommendations for DC  Safe RW use/hand placement    Assessment of Deficits: Pt seen for Occupational therapy evaluation in acute care setting. Pt demonstrated decreased ADLs, IADLs and Transfers. Pt functioning below baseline and will likely benefit from skilled occupational therapy services to maximize safety and independence. Goal(s): To be met in 3 Visits:  1). Bed to toilet: Independent    To be met in 5 Visits:  1). Supine to/from Sit:  Independent  2). Upper Body Bathing:   Independent  3). Lower Body Bathing:   Independent  4). Upper Body Dressing:  Independent  5). Lower Body Dressing:  Independent  6). Pt to demonstrate UE exs x 15 reps with minimal cues    Rehabilitation Potential:  Good for goals listed above. Strengths for achieving goals include: Pt motivated, PLOF, Family Support and Pt cooperative  Barriers to achieving goals include:  Complexity of condition     Plan: To be seen 3-5 x/wk while in acute care setting for therapeutic exercises, bed mobility, transfers, dressing, bathing, family/patient education, ADL/IADL retraining, energy conservation training.      Nathanael Yap, MOT, OTR/L   [id number]           If patient discharges from this facility prior to next visit, this note will serve as the Discharge Summary

## 2020-11-27 ENCOUNTER — CARE COORDINATION (OUTPATIENT)
Dept: CASE MANAGEMENT | Age: 70
End: 2020-11-27

## 2020-11-27 RX ORDER — CARVEDILOL 12.5 MG/1
18.5 TABLET ORAL 2 TIMES DAILY WITH MEALS
COMMUNITY
End: 2020-12-10 | Stop reason: ALTCHOICE

## 2020-11-27 NOTE — CARE COORDINATION
T[address] Initial Outreach    Call within 2 business days of discharge: Yes    Patient: Navneet Milton Patient : 1950   MRN: [id number]  Discharge Date: 20   RARS: Readmission Risk Score: 16      Last Discharge [facility]       Complaint Diagnosis Description Type Department Provider    20 Fatigue Generalized weakness . .. ED to Hosp-Admission (Discharged) (ADMITTED) KRISTIN 2 Krystian Gamble MD; Manpreet Dickson. .. Spoke with: Navneet Milton     Non-face-to-face services provided:  Obtained and reviewed discharge summary and/or continuity of care documents  Education of patient/family/caregiver/guardian to support self-management-s/s to report  Assessment and support for treatment adherence and medication management-med review    Challenges to be reviewed by the provider   Additional needs identified to be addressed with provider Yes  medications-clarification on carvedilol vs metoprolol    COVID-19 Detected on 2020. Patient informed of results, if available? Yes       Method of communication with provider : chart routing    Advance Care Planning:   Does patient have an Advance Directive:  decision maker updated. Was this a readmission? No  Patient stated reason for admission: fatigue, cough, fever, exposed to COVID-19   Patients top risk factors for readmission: medical condition    Care Transition Nurse (CTN) contacted the patient by telephone to perform post hospital discharge assessment. Verified name and  with patient as identifiers. Provided introduction to self, and explanation of the CTN role. CTN reviewed discharge instructions, medical action plan and red flags with patient who verbalized understanding. Patient given an opportunity to ask questions and does not have any further questions or concerns at this time. Were discharge instructions available to patient? Yes.  Reviewed appropriate site of care based on symptoms and resources available

## 2020-11-30 ENCOUNTER — CARE COORDINATION (OUTPATIENT)
Dept: CASE MANAGEMENT | Age: 70
End: 2020-11-30

## 2020-11-30 NOTE — CARE COORDINATION
Attempted COVID-19 Monitoring follow up outreach. Unable to reach patient by phone. Message left stating purpose of call with contact information requesting return call.       Minus Foots, RN  Care Transition Nurse  [phone] mobile    Future Appointments   Date Time Provider Elkin Desai   12/10/2020 11:30 AM Azeem Lunsford MD [facility] CARD[facility]

## 2020-12-01 ENCOUNTER — CARE COORDINATION (OUTPATIENT)
Dept: CASE MANAGEMENT | Age: 70
End: 2020-12-01

## 2020-12-01 NOTE — CARE COORDINATION
Patient contacted regarding COVID-19 risk and screening. Care Transition Nurse contacted the patient by telephone to perform follow-up assessment. Verified name and  with patient as identifiers. Patient has following risk factors for COVID-19: diabetes. Symptoms reviewed with patient who verbalized the following symptoms: no new symptoms and no worsening symptoms. Due to no new or worsening symptoms encounter was not routed to provider for escalation. Education provided regarding infection prevention, and signs and symptoms of COVID-19 and when to seek medical attention with patient who verbalized understanding. Discussed exposure protocols and quarantine from [address] you at higher risk for severe illness  and given an opportunity for questions and concerns. From [facility]: Are you at higher risk for severe illness? Has appt at [facility] on Monday. Taking carvedilol as he was prior to admission and not the metoprolol as prescribed at discharge. Sees cardiology on 12/10. Reviewed Dr Julien Mendiola reply to take meds as prescribed on his DC paperwork. He voices understanding. He denies cp, palpitations, SOB, lightheadedness, dizziness. His SaO2 drops with exertion but rebounds with rest. Wearing oxygen 4lpm. Expects [facility]. nurse this afternoon. He denies concerns at this time. CTN provided contact information for future reference. Plan for follow-up call in 7-14 days based on severity of symptoms and risk factors.     Marybeth Amato, RN  Care Transition Nurse  [phone] mobile    Future Appointments   Date Time Provider Elkin Desai   12/10/2020 11:30 AM Stacey Alba MD [facility] CARDIO [facility]

## 2020-12-10 ENCOUNTER — OFFICE VISIT (OUTPATIENT)
Dept: CARDIOLOGY CLINIC | Age: 70
End: 2020-12-10
Payer: MEDICARE

## 2020-12-10 ENCOUNTER — TELEPHONE (OUTPATIENT)
Dept: CARDIOLOGY CLINIC | Age: 70
End: 2020-12-10

## 2020-12-10 VITALS
BODY MASS INDEX: 25.25 KG/M2 | WEIGHT: 166.6 LBS | SYSTOLIC BLOOD PRESSURE: 88 MMHG | HEART RATE: 76 BPM | HEIGHT: 68 IN | DIASTOLIC BLOOD PRESSURE: 62 MMHG | RESPIRATION RATE: 16 BRPM | OXYGEN SATURATION: 96 %

## 2020-12-10 PROBLEM — I47.29 NSVT (NONSUSTAINED VENTRICULAR TACHYCARDIA) (HCC): Status: ACTIVE | Noted: 2020-12-10

## 2020-12-10 PROCEDURE — 99214 OFFICE O/P EST MOD 30 MIN: CPT | Performed by: INTERNAL MEDICINE

## 2020-12-10 RX ORDER — GLIPIZIDE 5 MG/1
5 TABLET ORAL
COMMUNITY

## 2020-12-10 RX ORDER — METOPROLOL SUCCINATE 25 MG/1
25 TABLET, EXTENDED RELEASE ORAL DAILY
Qty: 90 TABLET | Refills: 3 | Status: SHIPPED | OUTPATIENT
Start: 2020-12-10

## 2020-12-10 RX ORDER — ALLOPURINOL 100 MG/1
100 TABLET ORAL DAILY
COMMUNITY

## 2020-12-10 RX ORDER — LANOLIN ALCOHOL/MO/W.PET/CERES
1000 CREAM (GRAM) TOPICAL DAILY
COMMUNITY

## 2020-12-10 RX ORDER — METOPROLOL SUCCINATE 25 MG/1
12.5 TABLET, EXTENDED RELEASE ORAL DAILY
Qty: 90 TABLET | Refills: 3 | Status: ON HOLD | OUTPATIENT
Start: 2020-12-10 | End: 2021-05-11 | Stop reason: HOSPADM

## 2020-12-10 NOTE — TELEPHONE ENCOUNTER
[facility]. Also, pt use to take metoprolol tartrate 12.5mg and did not do well on it. Wants to know why pt is taking it again.  Please fax to [phone] ibrahima Mensah

## 2020-12-10 NOTE — PATIENT INSTRUCTIONS
Plan:  Stop Coreg and start Toprol XL 12.5 mg daily   Echocardiogram   One week cardiac event monitor   Stress test   Continue other medications   Check blood pressure at home weekly  Regular exercise and following a healthy diet encouraged   Follow up with me in 1 month after testing     Your provider has ordered testing for further evaluation. An order/prescription has been included in your paper work.  To schedule outpatient testing, contact Central Scheduling by calling [place] ([phone]).

## 2020-12-10 NOTE — TELEPHONE ENCOUNTER
Office visit faxed to [place]. [facility] also wanted us to know that patient has been on Metoprolol Tartrate in the past and had side effects. Also asking why he is on ASA.

## 2020-12-10 NOTE — PROGRESS NOTES
[address]   Cardiac Consultation    Referring Provider:  Nikita May     Chief Complaint   Patient presents with    Follow-Up from [facility] Other     NSVT        History of Present Illness:    Cameron Ibrahim is a 65 y.o. male who is here today for hospital follow up. Has a past medical history of Carotid artery occlusion, Cerebral artery occlusion with cerebral infarction  Cerebrovascular disease, Chronic back pain, COVID-19, Gout, hyperlipidemia, hypertension, diabetes. He had admitted to the hospital in 11/2020,treated for [place]. While in the ICU it was noted that he was having runs of NSVT. He was started on Metoprolol low dose. Today he states he has been feeling well since he left the hospital. He is now walking around with no walker and is getting his strength back. He is tolerating his medications. Patient currently denies any weight gain, edema, palpitations,dizziness, and syncope. Past Medical History:   has a past medical history of Carotid artery occlusion, Cerebral artery occlusion with cerebral infarction [facility]), Cerebrovascular disease, Chronic back pain, COVID-19, Gout, Hyperlipidemia, Hypertension, Routine health maintenance, and Type II or unspecified type diabetes mellitus without mention of complication, not stated as uncontrolled. Surgical History:   has a past surgical history that includes Prostate surgery; shoulder surgery; TURP (2017); Cataract removal with implant (Right); Cystoscopy; and pr reoper, carotid endartec>1 mon (Left, 8/27/2018). Social History:   reports that he has never smoked. He has never used smokeless tobacco. He reports that he does not drink alcohol or use drugs. Family History:  family history is not on file. Home Medications:  Prior to Admission medications    Medication Sig Start Date End Date Taking?  Authorizing Provider   empagliflozin (JARDIANCE) 25 MG tablet Take 25 mg by mouth daily   Yes Historical Provider, MD glipiZIDE (GLUCOTROL) 5 MG tablet Take 5 mg by mouth 2 times daily (before meals)   Yes Historical Provider, MD   INSULIN GLARGINE, 1 UNIT DIAL, SC Inject into the skin   Yes Historical Provider, MD   metFORMIN (GLUCOPHAGE) 500 MG tablet Take 500 mg by mouth 2 times daily (with meals)   Yes Historical Provider, MD   allopurinol (ZYLOPRIM) 100 MG tablet Take 100 mg by mouth daily   Yes Historical Provider, MD   VITAMIN D PO Take by mouth   Yes Historical Provider, MD   vitamin B-12 (CYANOCOBALAMIN) 1000 MCG tablet Take 1,000 mcg by mouth daily   Yes Historical Provider, MD   carvedilol (COREG) 12.5 MG tablet Take 18.5 mg by mouth 2 times daily (with meals) Takes one and one half tablet BID   Yes Xavi Gray   tamsulosin (FLOMAX) 0.4 MG capsule Take 0.4 mg by mouth nightly   Yes Historical Provider, MD   traMADol (ULTRAM) 50 MG tablet Take 50 mg by mouth every 8 hours as needed for Pain. 1 to 2 tablets for pain   Yes Historical Provider, MD   aspirin 81 MG chewable tablet Take 1 tablet by mouth daily 5/24/18  Yes Paco Baum MD   docusate sodium (COLACE) 100 MG capsule Take 1 capsule by mouth daily 5/12/18  Yes Mariola Noriega MD   atorvastatin (LIPITOR) 10 MG tablet Take 20 mg by mouth daily   Yes Historical Provider, MD   insulin glargine (LANTUS) 100 UNIT/ML injection Inject 10 Units into the skin nightly. Patient taking differently: Inject 20 Units into the skin 2 times daily  6/1/10 12/10/20 Yes Melissa Monroy MD   pregabalin (LYRICA) 100 MG capsule Take 200 mg by mouth 2 times daily. 3/18/10  Yes Historical Provider, MD   metoprolol tartrate (LOPRESSOR) 25 MG tablet Take 1 tablet by mouth 2 times daily  Patient not taking: Reported on 12/10/2020 11/26/20   Chica Mercer MD        Allergies:  Lisinopril     Review of Systems:   · Constitutional: there has been no unanticipated weight loss. There's been no change in energy level, sleep pattern, or activity level.      · Eyes: No visual changes or diplopia. No scleral icterus. · ENT: No Headaches, hearing loss or vertigo. No mouth sores or sore throat. · Cardiovascular: Reviewed in HPI  · Respiratory: No cough or wheezing, no sputum production. No hematemesis. · Gastrointestinal: No abdominal pain, appetite loss, blood in stools. No change in bowel or bladder habits. · Genitourinary: No dysuria, trouble voiding, or hematuria. · Musculoskeletal:  No gait disturbance, weakness or joint complaints. · Integumentary: No rash or pruritis. · Neurological: No headache, diplopia, change in muscle strength, numbness or tingling. No change in gait, balance, coordination, mood, affect, memory, mentation, behavior. · Psychiatric: No anxiety, no depression. · Endocrine: No malaise, fatigue or temperature intolerance. No excessive thirst, fluid intake, or urination. No tremor. · Hematologic/Lymphatic: No abnormal bruising or bleeding, blood clots or swollen lymph nodes. · Allergic/Immunologic: No nasal congestion or hives. Physical Examination:    Vitals:    12/10/20 1143   BP: 88/62   Pulse:    Resp:    SpO2:         Constitutional and General Appearance: NAD   Respiratory:  · Normal excursion and expansion without use of accessory muscles  · Resp Auscultation: Normal breath sounds without dullness  Cardiovascular:  · The apical impulses not displaced  · Heart tones are crisp and normal  · Cervical veins are not engorged  · The carotid upstroke is normal in amplitude and contour without delay or bruit  · Normal S1S2, No S3, No Murmur  · Peripheral pulses are symmetrical and full  · There is no clubbing, cyanosis of the extremities.   · No edema  · Femoral Arteries: 2+ and equal  · Pedal Pulses: 2+ and equal   Abdomen:  · No masses or tenderness  · Liver/Spleen: No Abnormalities Noted  Neurological/Psychiatric:  · Alert and oriented in all spheres  · Moves all extremities well  · Exhibits normal gait balance and coordination  · No abnormalities of mood, affect, memory, mentation, or behavior are noted    Echocardiogram 5/24/18  Summary   Normal left ventricle size, wall thickness, and systolic function with an   estimated ejection fraction of 55-60%. No regional wall motion abnormalities are seen. Diastolic filling parameters suggests normal diastolic function. Mild mitral regurgitation is present. Mild tricuspid regurgitation. Estimated pulmonary artery systolic pressure is 83USRI assuming a right   atrial pressure of 3mmHg. The left atrium is dilated. A bubble study was performed and fails to show evidence of right to left   shunting. Assessment:   NSVT - noted during hospitalization 11/2020 for covid. likely due to acute illness. Asymptomatic. Consider underlying ischemic heart disease  SOB - resolving  Covid PNA - 11/2020  Bradycardia - exacerbated by BBlk  HTN - controlled  HLD  DM   PAD  Echo from 2018 - normal LVEF    Plan:  Stop Coreg and start Toprol XL 12.5 mg daily   Echocardiogram   One week cardiac event monitor   Stress test   Continue other medications   Check blood pressure at home weekly  Regular exercise and following a healthy diet encouraged   Follow up with me in 1 month after testing     Scribe's attestation: This note was scribed in the presence of Dr. Roger Mares M.D. By Katie Akhtar RN    The scribes documentation has been prepared under my direction and personally reviewed by me in its entirety. I confirm that the note above accurately reflects all work, treatment, procedures, and medical decision making performed by me. Dr. Roger Mares MD    Thank you for allowing me to participate in the care of this individual.      [place].  Pamela Garcia M.D., [facility]

## 2020-12-11 ENCOUNTER — CARE COORDINATION (OUTPATIENT)
Dept: CASE MANAGEMENT | Age: 70
End: 2020-12-11

## 2020-12-11 NOTE — CARE COORDINATION
Patient resolved from the Care Transitions episode on 12/11/2020. Unable to reach patient by phone. Message left stating purpose of call with contact information requesting return call. No further outreach scheduled with this CTN. Episode of Care resolved. Patient has this CTN contact information if future needs arise.     Celi Ko RN  Care Transition Nurse  [phone] mobile    Future Appointments   Date Time Provider Department Center   1/14/2021 10:15 AM Georgiana Chance MD [facility] CARDIO [facility]

## 2020-12-14 ENCOUNTER — TELEPHONE (OUTPATIENT)
Dept: CARDIOLOGY CLINIC | Age: 70
End: 2020-12-14

## 2020-12-14 NOTE — TELEPHONE ENCOUNTER
Central scheduling called, pt is scheduled for the echo and stress test on 12/23, and diagnosis codes not meet medical necessity with medicare. Will need new diagnosis codes entered. Will need new orders in when codes are updated. Please call [place] at [phone] when they are updated.

## 2020-12-18 ENCOUNTER — TELEPHONE (OUTPATIENT)
Dept: CARDIOLOGY CLINIC | Age: 70
End: 2020-12-18

## 2020-12-18 NOTE — TELEPHONE ENCOUNTER
Let patient know, I think that these numbers are in a reasonable range, continue current medications, would only adjust medicines if he is not feeling well, he should call us back if he has any other concerns. Thank you.

## 2020-12-18 NOTE — TELEPHONE ENCOUNTER
Patient called to inform [facility] of his BP readings:    12/14 84/50   P83  12/15 96/56   P91  12/16 99/60   P86  12/17 113/54   P77  12/18 84/56   P85

## 2020-12-18 NOTE — TELEPHONE ENCOUNTER
[facility] and [facility]: Spoke with pt. He is feeling dizziness with his low blood pressure. In the past he was taken off of his Metoprolol due to low blood pressures and dizziness. He is also on Flomax. Please advise if medication changes are warranted.

## 2021-05-08 ENCOUNTER — APPOINTMENT (OUTPATIENT)
Dept: GENERAL RADIOLOGY | Age: 71
DRG: 862 | End: 2021-05-08
Payer: MEDICARE

## 2021-05-08 ENCOUNTER — HOSPITAL ENCOUNTER (INPATIENT)
Age: 71
LOS: 2 days | Discharge: HOME | DRG: 862 | End: 2021-05-11
Attending: EMERGENCY MEDICINE | Admitting: HOSPITALIST
Payer: MEDICARE

## 2021-05-08 ENCOUNTER — APPOINTMENT (OUTPATIENT)
Dept: CT IMAGING | Age: 71
DRG: 862 | End: 2021-05-08
Payer: MEDICARE

## 2021-05-08 DIAGNOSIS — A41.9 SEPSIS SECONDARY TO UTI (HCC): Primary | ICD-10-CM

## 2021-05-08 DIAGNOSIS — N39.0 SEPSIS SECONDARY TO UTI (HCC): Primary | ICD-10-CM

## 2021-05-08 LAB
GLUCOSE BLD-MCNC: 177 MG/DL (ref 70–99)
PERFORMED ON: ABNORMAL

## 2021-05-08 PROCEDURE — 87040 BLOOD CULTURE FOR BACTERIA: CPT

## 2021-05-08 PROCEDURE — 2580000003 HC RX 258: Performed by: EMERGENCY MEDICINE

## 2021-05-08 PROCEDURE — 81001 URINALYSIS AUTO W/SCOPE: CPT

## 2021-05-08 PROCEDURE — 85025 COMPLETE CBC W/AUTO DIFF WBC: CPT

## 2021-05-08 PROCEDURE — 96366 THER/PROPH/DIAG IV INF ADDON: CPT

## 2021-05-08 PROCEDURE — 6370000000 HC RX 637 (ALT 250 FOR IP): Performed by: EMERGENCY MEDICINE

## 2021-05-08 PROCEDURE — 96367 TX/PROPH/DG ADDL SEQ IV INF: CPT

## 2021-05-08 PROCEDURE — 99285 EMERGENCY DEPT VISIT HI MDM: CPT

## 2021-05-08 PROCEDURE — 87186 SC STD MICRODIL/AGAR DIL: CPT

## 2021-05-08 PROCEDURE — 93005 ELECTROCARDIOGRAM TRACING: CPT | Performed by: EMERGENCY MEDICINE

## 2021-05-08 PROCEDURE — 87150 DNA/RNA AMPLIFIED PROBE: CPT

## 2021-05-08 PROCEDURE — 96361 HYDRATE IV INFUSION ADD-ON: CPT

## 2021-05-08 PROCEDURE — 87077 CULTURE AEROBIC IDENTIFY: CPT

## 2021-05-08 PROCEDURE — 84484 ASSAY OF TROPONIN QUANT: CPT

## 2021-05-08 PROCEDURE — 83605 ASSAY OF LACTIC ACID: CPT

## 2021-05-08 PROCEDURE — 80053 COMPREHEN METABOLIC PANEL: CPT

## 2021-05-08 PROCEDURE — 87086 URINE CULTURE/COLONY COUNT: CPT

## 2021-05-08 PROCEDURE — 6360000002 HC RX W HCPCS: Performed by: EMERGENCY MEDICINE

## 2021-05-08 PROCEDURE — 71045 X-RAY EXAM CHEST 1 VIEW: CPT

## 2021-05-08 PROCEDURE — 74176 CT ABD & PELVIS W/O CONTRAST: CPT

## 2021-05-08 PROCEDURE — 96365 THER/PROPH/DIAG IV INF INIT: CPT

## 2021-05-08 PROCEDURE — 84145 PROCALCITONIN (PCT): CPT

## 2021-05-08 RX ORDER — 0.9 % SODIUM CHLORIDE 0.9 %
1000 INTRAVENOUS SOLUTION INTRAVENOUS ONCE
Status: COMPLETED | OUTPATIENT
Start: 2021-05-08 | End: 2021-05-09

## 2021-05-08 RX ORDER — ACETAMINOPHEN 500 MG
1000 TABLET ORAL ONCE
Status: COMPLETED | OUTPATIENT
Start: 2021-05-08 | End: 2021-05-08

## 2021-05-08 RX ADMIN — SODIUM CHLORIDE 1000 ML: 9 INJECTION, SOLUTION INTRAVENOUS at 23:44

## 2021-05-08 RX ADMIN — ACETAMINOPHEN 1000 MG: 500 TABLET ORAL at 23:44

## 2021-05-08 RX ADMIN — CEFEPIME 2000 MG: 2 INJECTION, POWDER, FOR SOLUTION INTRAVENOUS at 23:44

## 2021-05-08 ASSESSMENT — PAIN SCALES - GENERAL: PAINLEVEL_OUTOF10: 0

## 2021-05-09 PROBLEM — A41.9 SEPSIS (HCC): Status: ACTIVE | Noted: 2021-05-09

## 2021-05-09 PROBLEM — N39.0 SEPSIS SECONDARY TO UTI (HCC): Status: ACTIVE | Noted: 2021-05-09

## 2021-05-09 LAB
A/G RATIO: 1.5 (ref 1.1–2.2)
ALBUMIN SERPL-MCNC: 4.3 G/DL (ref 3.4–5)
ALP BLD-CCNC: 176 U/L (ref 40–129)
ALT SERPL-CCNC: 11 U/L (ref 10–40)
ANION GAP SERPL CALCULATED.3IONS-SCNC: 12 MMOL/L (ref 3–16)
AST SERPL-CCNC: 16 U/L (ref 15–37)
BACTERIA: ABNORMAL /HPF
BASOPHILS ABSOLUTE: 0 K/UL (ref 0–0.2)
BASOPHILS RELATIVE PERCENT: 0.3 %
BILIRUB SERPL-MCNC: 0.7 MG/DL (ref 0–1)
BILIRUBIN URINE: NEGATIVE
BLOOD, URINE: ABNORMAL
BUN BLDV-MCNC: 29 MG/DL (ref 7–20)
CALCIUM SERPL-MCNC: 9.8 MG/DL (ref 8.3–10.6)
CHLORIDE BLD-SCNC: 99 MMOL/L (ref 99–110)
CLARITY: CLEAR
CO2: 23 MMOL/L (ref 21–32)
COLOR: YELLOW
CREAT SERPL-MCNC: 1.4 MG/DL (ref 0.8–1.3)
EKG ATRIAL RATE: 104 BPM
EKG DIAGNOSIS: NORMAL
EKG P AXIS: -2 DEGREES
EKG P-R INTERVAL: 170 MS
EKG Q-T INTERVAL: 332 MS
EKG QRS DURATION: 82 MS
EKG QTC CALCULATION (BAZETT): 436 MS
EKG R AXIS: -39 DEGREES
EKG T AXIS: 56 DEGREES
EKG VENTRICULAR RATE: 104 BPM
EOSINOPHILS ABSOLUTE: 0 K/UL (ref 0–0.6)
EOSINOPHILS RELATIVE PERCENT: 1.2 %
EPITHELIAL CELLS, UA: ABNORMAL /HPF (ref 0–5)
GFR AFRICAN AMERICAN: >60
GFR NON-AFRICAN AMERICAN: 50
GLOBULIN: 2.8 G/DL
GLUCOSE BLD-MCNC: 109 MG/DL (ref 70–99)
GLUCOSE BLD-MCNC: 122 MG/DL (ref 70–99)
GLUCOSE BLD-MCNC: 164 MG/DL (ref 70–99)
GLUCOSE BLD-MCNC: 185 MG/DL (ref 70–99)
GLUCOSE BLD-MCNC: 229 MG/DL (ref 70–99)
GLUCOSE BLD-MCNC: 233 MG/DL (ref 70–99)
GLUCOSE BLD-MCNC: 327 MG/DL (ref 70–99)
GLUCOSE BLD-MCNC: 428 MG/DL (ref 70–99)
GLUCOSE URINE: >=1000 MG/DL
HCT VFR BLD CALC: 39.9 % (ref 40.5–52.5)
HEMOGLOBIN: 13.6 G/DL (ref 13.5–17.5)
INFLUENZA A: NOT DETECTED
INFLUENZA B: NOT DETECTED
KETONES, URINE: ABNORMAL MG/DL
LACTIC ACID: 1.1 MMOL/L (ref 0.4–2)
LACTIC ACID: 1.5 MMOL/L (ref 0.4–2)
LACTIC ACID: 1.7 MMOL/L (ref 0.4–2)
LACTIC ACID: 2.7 MMOL/L (ref 0.4–2)
LEUKOCYTE ESTERASE, URINE: NEGATIVE
LYMPHOCYTES ABSOLUTE: 0.3 K/UL (ref 1–5.1)
LYMPHOCYTES RELATIVE PERCENT: 10.6 %
MCH RBC QN AUTO: 32.6 PG (ref 26–34)
MCHC RBC AUTO-ENTMCNC: 34.2 G/DL (ref 31–36)
MCV RBC AUTO: 95.4 FL (ref 80–100)
MICROSCOPIC EXAMINATION: YES
MONOCYTES ABSOLUTE: 0.1 K/UL (ref 0–1.3)
MONOCYTES RELATIVE PERCENT: 2.3 %
MUCUS: ABNORMAL /LPF
NEUTROPHILS ABSOLUTE: 2.2 K/UL (ref 1.7–7.7)
NEUTROPHILS RELATIVE PERCENT: 85.6 %
NITRITE, URINE: NEGATIVE
PDW BLD-RTO: 14.6 % (ref 12.4–15.4)
PERFORMED ON: ABNORMAL
PH UA: 7.5 (ref 5–8)
PLATELET # BLD: 103 K/UL (ref 135–450)
PMV BLD AUTO: 8 FL (ref 5–10.5)
POTASSIUM REFLEX MAGNESIUM: 4.2 MMOL/L (ref 3.5–5.1)
PROCALCITONIN: 0.68 NG/ML (ref 0–0.15)
PROTEIN UA: 30 MG/DL
RBC # BLD: 4.18 M/UL (ref 4.2–5.9)
RBC UA: ABNORMAL /HPF (ref 0–4)
REPORT: NORMAL
SARS-COV-2 RNA, RT PCR: NOT DETECTED
SODIUM BLD-SCNC: 134 MMOL/L (ref 136–145)
SPECIFIC GRAVITY UA: 1.01 (ref 1–1.03)
TOTAL PROTEIN: 7.1 G/DL (ref 6.4–8.2)
TROPONIN: <0.01 NG/ML
URINE REFLEX TO CULTURE: YES
URINE TYPE: ABNORMAL
UROBILINOGEN, URINE: 1 E.U./DL
WBC # BLD: 2.6 K/UL (ref 4–11)
WBC UA: ABNORMAL /HPF (ref 0–5)

## 2021-05-09 PROCEDURE — 6370000000 HC RX 637 (ALT 250 FOR IP): Performed by: INTERNAL MEDICINE

## 2021-05-09 PROCEDURE — 2060000000 HC ICU INTERMEDIATE R&B

## 2021-05-09 PROCEDURE — 87636 SARSCOV2 & INF A&B AMP PRB: CPT

## 2021-05-09 PROCEDURE — 93010 ELECTROCARDIOGRAM REPORT: CPT | Performed by: INTERNAL MEDICINE

## 2021-05-09 PROCEDURE — 83036 HEMOGLOBIN GLYCOSYLATED A1C: CPT

## 2021-05-09 PROCEDURE — 6360000002 HC RX W HCPCS: Performed by: EMERGENCY MEDICINE

## 2021-05-09 PROCEDURE — 2580000003 HC RX 258: Performed by: EMERGENCY MEDICINE

## 2021-05-09 PROCEDURE — 36415 COLL VENOUS BLD VENIPUNCTURE: CPT

## 2021-05-09 PROCEDURE — 6360000002 HC RX W HCPCS: Performed by: HOSPITALIST

## 2021-05-09 PROCEDURE — 99233 SBSQ HOSP IP/OBS HIGH 50: CPT | Performed by: INTERNAL MEDICINE

## 2021-05-09 PROCEDURE — 2580000003 HC RX 258: Performed by: HOSPITALIST

## 2021-05-09 PROCEDURE — 83605 ASSAY OF LACTIC ACID: CPT

## 2021-05-09 PROCEDURE — 6370000000 HC RX 637 (ALT 250 FOR IP): Performed by: HOSPITALIST

## 2021-05-09 RX ORDER — METOPROLOL SUCCINATE 25 MG/1
25 TABLET, EXTENDED RELEASE ORAL DAILY
Status: DISCONTINUED | OUTPATIENT
Start: 2021-05-09 | End: 2021-05-11 | Stop reason: HOSPADM

## 2021-05-09 RX ORDER — ACETAMINOPHEN 325 MG/1
650 TABLET ORAL EVERY 6 HOURS PRN
Status: DISCONTINUED | OUTPATIENT
Start: 2021-05-09 | End: 2021-05-11 | Stop reason: HOSPADM

## 2021-05-09 RX ORDER — ACETAMINOPHEN 650 MG/1
650 SUPPOSITORY RECTAL EVERY 6 HOURS PRN
Status: DISCONTINUED | OUTPATIENT
Start: 2021-05-09 | End: 2021-05-11 | Stop reason: HOSPADM

## 2021-05-09 RX ORDER — TRAMADOL HYDROCHLORIDE 50 MG/1
50 TABLET ORAL EVERY 8 HOURS PRN
Status: DISCONTINUED | OUTPATIENT
Start: 2021-05-09 | End: 2021-05-11 | Stop reason: HOSPADM

## 2021-05-09 RX ORDER — SODIUM CHLORIDE 9 MG/ML
25 INJECTION, SOLUTION INTRAVENOUS PRN
Status: DISCONTINUED | OUTPATIENT
Start: 2021-05-09 | End: 2021-05-11 | Stop reason: HOSPADM

## 2021-05-09 RX ORDER — ONDANSETRON 2 MG/ML
4 INJECTION INTRAMUSCULAR; INTRAVENOUS EVERY 6 HOURS PRN
Status: DISCONTINUED | OUTPATIENT
Start: 2021-05-09 | End: 2021-05-11 | Stop reason: HOSPADM

## 2021-05-09 RX ORDER — INSULIN GLARGINE 100 [IU]/ML
20 INJECTION, SOLUTION SUBCUTANEOUS 2 TIMES DAILY
Status: DISCONTINUED | OUTPATIENT
Start: 2021-05-09 | End: 2021-05-11 | Stop reason: HOSPADM

## 2021-05-09 RX ORDER — SODIUM CHLORIDE 0.9 % (FLUSH) 0.9 %
5-40 SYRINGE (ML) INJECTION EVERY 12 HOURS SCHEDULED
Status: DISCONTINUED | OUTPATIENT
Start: 2021-05-09 | End: 2021-05-11 | Stop reason: HOSPADM

## 2021-05-09 RX ORDER — SODIUM CHLORIDE 9 MG/ML
INJECTION, SOLUTION INTRAVENOUS CONTINUOUS
Status: DISCONTINUED | OUTPATIENT
Start: 2021-05-09 | End: 2021-05-11 | Stop reason: HOSPADM

## 2021-05-09 RX ORDER — POLYETHYLENE GLYCOL 3350 17 G/17G
17 POWDER, FOR SOLUTION ORAL DAILY PRN
Status: DISCONTINUED | OUTPATIENT
Start: 2021-05-09 | End: 2021-05-11 | Stop reason: HOSPADM

## 2021-05-09 RX ORDER — ALLOPURINOL 100 MG/1
100 TABLET ORAL DAILY
Status: DISCONTINUED | OUTPATIENT
Start: 2021-05-09 | End: 2021-05-11 | Stop reason: HOSPADM

## 2021-05-09 RX ORDER — SODIUM CHLORIDE 0.9 % (FLUSH) 0.9 %
5-40 SYRINGE (ML) INJECTION PRN
Status: DISCONTINUED | OUTPATIENT
Start: 2021-05-09 | End: 2021-05-11 | Stop reason: HOSPADM

## 2021-05-09 RX ORDER — ATORVASTATIN CALCIUM 10 MG/1
20 TABLET, FILM COATED ORAL DAILY
Status: DISCONTINUED | OUTPATIENT
Start: 2021-05-09 | End: 2021-05-11 | Stop reason: HOSPADM

## 2021-05-09 RX ORDER — DEXTROSE MONOHYDRATE 50 MG/ML
100 INJECTION, SOLUTION INTRAVENOUS PRN
Status: DISCONTINUED | OUTPATIENT
Start: 2021-05-09 | End: 2021-05-11 | Stop reason: HOSPADM

## 2021-05-09 RX ORDER — 0.9 % SODIUM CHLORIDE 0.9 %
1000 INTRAVENOUS SOLUTION INTRAVENOUS ONCE
Status: COMPLETED | OUTPATIENT
Start: 2021-05-09 | End: 2021-05-09

## 2021-05-09 RX ORDER — PREGABALIN 100 MG/1
300 CAPSULE ORAL 2 TIMES DAILY
Status: DISCONTINUED | OUTPATIENT
Start: 2021-05-09 | End: 2021-05-11 | Stop reason: HOSPADM

## 2021-05-09 RX ORDER — PREGABALIN 100 MG/1
200 CAPSULE ORAL 2 TIMES DAILY
Status: DISCONTINUED | OUTPATIENT
Start: 2021-05-09 | End: 2021-05-09

## 2021-05-09 RX ORDER — ASPIRIN 81 MG/1
81 TABLET, CHEWABLE ORAL DAILY
Status: DISCONTINUED | OUTPATIENT
Start: 2021-05-09 | End: 2021-05-11 | Stop reason: HOSPADM

## 2021-05-09 RX ORDER — PROMETHAZINE HYDROCHLORIDE 25 MG/1
12.5 TABLET ORAL EVERY 6 HOURS PRN
Status: DISCONTINUED | OUTPATIENT
Start: 2021-05-09 | End: 2021-05-11 | Stop reason: HOSPADM

## 2021-05-09 RX ORDER — DEXTROSE MONOHYDRATE 25 G/50ML
12.5 INJECTION, SOLUTION INTRAVENOUS PRN
Status: DISCONTINUED | OUTPATIENT
Start: 2021-05-09 | End: 2021-05-11 | Stop reason: HOSPADM

## 2021-05-09 RX ORDER — TAMSULOSIN HYDROCHLORIDE 0.4 MG/1
0.4 CAPSULE ORAL NIGHTLY
Status: DISCONTINUED | OUTPATIENT
Start: 2021-05-09 | End: 2021-05-11 | Stop reason: HOSPADM

## 2021-05-09 RX ORDER — NICOTINE POLACRILEX 4 MG
15 LOZENGE BUCCAL PRN
Status: DISCONTINUED | OUTPATIENT
Start: 2021-05-09 | End: 2021-05-11 | Stop reason: HOSPADM

## 2021-05-09 RX ADMIN — ASPIRIN 81 MG: 81 TABLET, CHEWABLE ORAL at 08:57

## 2021-05-09 RX ADMIN — Medication 10 ML: at 08:57

## 2021-05-09 RX ADMIN — ENOXAPARIN SODIUM 40 MG: 40 INJECTION SUBCUTANEOUS at 08:57

## 2021-05-09 RX ADMIN — CEFEPIME 2000 MG: 2 INJECTION, POWDER, FOR SOLUTION INTRAVENOUS at 11:26

## 2021-05-09 RX ADMIN — VANCOMYCIN HYDROCHLORIDE 1500 MG: 10 INJECTION, POWDER, LYOPHILIZED, FOR SOLUTION INTRAVENOUS at 00:20

## 2021-05-09 RX ADMIN — ALLOPURINOL 100 MG: 100 TABLET ORAL at 08:57

## 2021-05-09 RX ADMIN — SODIUM CHLORIDE: 9 INJECTION, SOLUTION INTRAVENOUS at 14:35

## 2021-05-09 RX ADMIN — ATORVASTATIN CALCIUM 20 MG: 10 TABLET, FILM COATED ORAL at 08:57

## 2021-05-09 RX ADMIN — TRAMADOL HYDROCHLORIDE 50 MG: 50 TABLET ORAL at 16:33

## 2021-05-09 RX ADMIN — CEFEPIME 2000 MG: 2 INJECTION, POWDER, FOR SOLUTION INTRAVENOUS at 23:06

## 2021-05-09 RX ADMIN — Medication 10 ML: at 19:59

## 2021-05-09 RX ADMIN — SODIUM CHLORIDE: 9 INJECTION, SOLUTION INTRAVENOUS at 05:29

## 2021-05-09 RX ADMIN — PREGABALIN 200 MG: 100 CAPSULE ORAL at 08:57

## 2021-05-09 RX ADMIN — PREGABALIN 300 MG: 100 CAPSULE ORAL at 19:59

## 2021-05-09 RX ADMIN — INSULIN GLARGINE 20 UNITS: 100 INJECTION, SOLUTION SUBCUTANEOUS at 21:10

## 2021-05-09 RX ADMIN — SODIUM CHLORIDE 1000 ML: 9 INJECTION, SOLUTION INTRAVENOUS at 02:03

## 2021-05-09 RX ADMIN — TAMSULOSIN HYDROCHLORIDE 0.4 MG: 0.4 CAPSULE ORAL at 19:59

## 2021-05-09 RX ADMIN — ACETAMINOPHEN 650 MG: 325 TABLET ORAL at 14:35

## 2021-05-09 RX ADMIN — VANCOMYCIN HYDROCHLORIDE 1000 MG: 1 INJECTION, POWDER, LYOPHILIZED, FOR SOLUTION INTRAVENOUS at 20:00

## 2021-05-09 ASSESSMENT — PAIN DESCRIPTION - DESCRIPTORS: DESCRIPTORS: ACHING;CRAMPING

## 2021-05-09 ASSESSMENT — PAIN DESCRIPTION - PAIN TYPE: TYPE: CHRONIC PAIN

## 2021-05-09 ASSESSMENT — PAIN DESCRIPTION - ORIENTATION: ORIENTATION: LEFT

## 2021-05-09 ASSESSMENT — PAIN DESCRIPTION - PROGRESSION: CLINICAL_PROGRESSION: NOT CHANGED

## 2021-05-09 ASSESSMENT — PAIN SCALES - GENERAL
PAINLEVEL_OUTOF10: 6
PAINLEVEL_OUTOF10: 0
PAINLEVEL_OUTOF10: 0
PAINLEVEL_OUTOF10: 6

## 2021-05-09 ASSESSMENT — PAIN DESCRIPTION - FREQUENCY: FREQUENCY: INTERMITTENT

## 2021-05-09 ASSESSMENT — PAIN DESCRIPTION - LOCATION: LOCATION: FOOT

## 2021-05-09 ASSESSMENT — PAIN DESCRIPTION - ONSET: ONSET: GRADUAL

## 2021-05-09 ASSESSMENT — PAIN - FUNCTIONAL ASSESSMENT: PAIN_FUNCTIONAL_ASSESSMENT: 0-10

## 2021-05-09 NOTE — ED NOTES
6970- Perfect serve was sent to the hospitalist regarding admission.    9599-  returned call and spoke with Yari Pfeiffer  05/09/21 Elkin Veras  05/09/21 [id number]

## 2021-05-09 NOTE — H&P
Hospital Medicine History & Physical      PCP: Janeth Grande    Date of Admission: 5/8/2021    Date of Service: Pt seen/examined on 5/9/2021    Chief Complaint:    Chief Complaint   Patient presents with    Altered Mental Status     pt arrives via EMS, pt was out mowing grass came inside around 8pm. pt unable to walk, urinated on himself, fever 103.0  EMS States he was unable to get out of bed when they got there. pt A&O X4. pt had cystoscopy yesterday. History Of Present Illness: The patient is a 65 y.o. male with chronic pain, hx of COVID-19 infection, Gout, HTN, HLD, DM2 who presented to [facility] ED with complaint of weakness and fevers. Patient reports that he had a cystoscopy at the [place] on Friday as an outpatient procedure. Has been having difficulty peeing and reports they were looking for any obstruction. He went home on Friday feeling well, but after mowing his yard yesterday, he went in and sat down to talk with his family and started feeling very cold and shaking. He went in to his bed and his wife covered him with a lot of blankets and he said about 15 mins later he wasn't shaking anymore. He went to try to get up but was too weak and even with his 2 sons help, he still couldn't get up. He reports some dysuria but no abdominal pain, CP cough, SOB, N/V. Past Medical History:        Diagnosis Date    Carotid artery occlusion 3/10    60-69% left (neg stress test 5/10)    Cerebral artery occlusion with cerebral infarction [facility])     Cerebrovascular disease 3/10    ? TIA    Chronic back pain      Dr Edilberto Moore - pain management    COVID-19 11/20/2020    Gout     Hyperlipidemia     Hypertension     Routine health maintenance     Type II or unspecified type diabetes mellitus without mention of complication, not stated as uncontrolled     eye exam 5/30       Past Surgical History:        Procedure Laterality Date    CATARACT REMOVAL WITH IMPLANT Right     CYSTOSCOPY      [place] REOPER, CAROTID ENDARTEC>1 MON Left 8/27/2018    LEFT CAROTID ENDARTERECTOMY performed by Marnie Phalen, MD at [address]      neg prostate biopsy 1-09    SHOULDER SURGERY      JOINT CLEANING    TURP  2017       Medications Prior to Admission:    Prior to Admission medications    Medication Sig Start Date End Date Taking? Authorizing Provider   empagliflozin (JARDIANCE) 25 MG tablet Take 25 mg by mouth daily   Yes Historical Provider, MD   glipiZIDE (GLUCOTROL) 5 MG tablet Take 5 mg by mouth 2 times daily (before meals)   Yes Historical Provider, MD   INSULIN GLARGINE, 1 UNIT DIAL, SC Inject into the skin   Yes Historical Provider, MD   metFORMIN (GLUCOPHAGE) 500 MG tablet Take 500 mg by mouth 2 times daily (with meals)   Yes Historical Provider, MD   allopurinol (ZYLOPRIM) 100 MG tablet Take 100 mg by mouth daily   Yes Historical Provider, MD   VITAMIN D PO Take by mouth   Yes Historical Provider, MD   vitamin B-12 (CYANOCOBALAMIN) 1000 MCG tablet Take 1,000 mcg by mouth daily   Yes Historical Provider, MD   metoprolol succinate (TOPROL XL) 25 MG extended release tablet Take 1 tablet by mouth daily 12/10/20  Yes Etienne Fang MD   metoprolol succinate (TOPROL XL) 25 MG extended release tablet Take 0.5 tablets by mouth daily 12/10/20  Yes Etienne Fang MD   metoprolol tartrate (LOPRESSOR) 25 MG tablet Take 1 tablet by mouth 2 times daily 11/26/20  Yes Derek Hawkins MD   traMADol (ULTRAM) 50 MG tablet Take 50 mg by mouth every 8 hours as needed for Pain. 1 to 2 tablets for pain   Yes Historical Provider, MD   aspirin 81 MG chewable tablet Take 1 tablet by mouth daily 5/24/18  Yes Ludivina Sales MD   atorvastatin (LIPITOR) 10 MG tablet Take 20 mg by mouth daily   Yes Historical Provider, MD   pregabalin (LYRICA) 100 MG capsule Take 200 mg by mouth 2 times daily.   3/18/10  Yes Historical Provider, MD   tamsulosin (FLOMAX) 0.4 MG capsule Take 0.4 mg by mouth nightly    Historical Provider, MD docusate sodium (COLACE) 100 MG capsule Take 1 capsule by mouth daily 5/12/18   Jaime Pacheco MD   insulin glargine (LANTUS) 100 UNIT/ML injection Inject 10 Units into the skin nightly. Patient taking differently: Inject 20 Units into the skin 2 times daily  6/1/10 12/10/20  Jami Chavarria MD       Allergies:  Lisinopril    Social History:  The patient currently lives at home     TOBACCO:   reports that he has never smoked. He has never used smokeless tobacco.  ETOH:   reports no history of alcohol use. Family History:   Positive as follows:    History reviewed. No pertinent family history. REVIEW OF SYSTEMS:     Constitutional: +  Fever, + chills  HENT: Negative for sore throat   Eyes: Negative for redness   Respiratory: Negative  for dyspnea, cough   Cardiovascular: Negative for chest pain   Gastrointestinal: Negative for vomiting, diarrhea   Genitourinary: Negative for hematuria   Musculoskeletal: Negative for arthralgias   Skin: Negative for rash   Neurological: + weakness   Hematological: Negative for adenopathy   Psychiatric/Behavorial: Negative for anxiety    PHYSICAL EXAM:    BP 99/62   Pulse 72   Temp 98.1 °F (36.7 °C) (Oral)   Resp 16   Ht 5' 7\" (1.702 m)   Wt 166 lb (75.3 kg)   SpO2 95%   BMI 26.00 kg/m²   Gen: No distress. Alert. Eyes:  No conjunctival injection. ENT: No discharge. Pharynx clear. Neck: Trachea midline. Resp: No accessory muscle use. No crackles. No wheezes. No rhonchi. CV: Regular rate. Regular rhythm. No murmur. No rub. No edema. Capillary Refill: Brisk,< 3 seconds   Peripheral Pulses: +2 palpable, equal bilaterally   GI: Non-tender. Non-distended Normal bowel sounds. Skin: Warm and dry. Superficial abrasion to BLEs  M/S: No cyanosis. No joint deformity. No clubbing. Neuro: Awake. Grossly nonfocal, generalized weakness  Psych: Oriented x 3. No anxiety or agitation.        MARIA ESTHER Arroyo have reviewed the chart on [address] and personally interviewed and examined patient, reviewed the data (labs and imaging) and after discussion with my PA formulated the plan. Agree with note with the following edits. HPI: A 52-year-old male with history of chronic pain, hypertension, history of COVID-19 infection, type 2 diabetes presented emergency room with weakness and fevers. He underwent a cystoscopy on Friday for urinary obstruction. Started noticing chills and severe weakness yesterday. I reviewed the patient's Past Medical History, Past Surgical History, Medications, and Allergies. Physical exam:    BP (!) 118/59   Pulse 73   Temp 97.8 °F (36.6 °C) (Oral)   Resp 17   Ht 5' 7\" (1.702 m)   Wt 166 lb (75.3 kg)   SpO2 97%   BMI 26.00 kg/m²     Gen: No distress. Alert. Eyes: PERRL. No sclera icterus. No conjunctival injection. ENT: No discharge. Pharynx clear. Neck: Trachea midline. Normal thyroid. Resp: No accessory muscle use. No crackles. No wheezes. No rhonchi. No dullness on percussion. CV: Regular rate. Regular rhythm. No murmur or rub. No edema. GI: Non-tender. Non-distended. No masses. No organomegaly. Normal bowel sounds. No hernia. Skin: Warm and dry. No nodule on exposed extremities. No rash on exposed extremities. Lymph: No cervical LAD. No supraclavicular LAD. M/S: No cyanosis. No joint deformity. No clubbing. Neuro: Awake. Reflexes 2+ symmetric bilaterally. Moves all 4 extremities, non focal  Psych: Oriented x 3. No anxiety or agitation.            CHASITY Hunter.      CBC:   Recent Labs     05/08/21  2320   WBC 2.6*   HGB 13.6   HCT 39.9*   MCV 95.4   *     BMP:   Recent Labs     05/08/21  2320   *   K 4.2   CL 99   CO2 23   BUN 29*   CREATININE 1.4*     LIVER PROFILE:   Recent Labs     05/08/21  2320   AST 16   ALT 11   BILITOT 0.7   ALKPHOS 176*     UA:  Recent Labs     05/08/21  2346   COLORU Yellow   PHUR 7.5   WBCUA 21-50*   RBCUA 21-50*   MUCUS 1+*   BACTERIA 2+*   CLARITYU Clear   SPECGRAV 1.015   LEUKOCYTESUR Negative   UROBILINOGEN 1.0   BILIRUBINUR Negative   BLOODU MODERATE*   GLUCOSEU >=1000*     CARDIAC ENZYMES  Recent Labs     05/08/21  2320   TROPONINI <0.01     CULTURES  SARS-CoV-2 RNA, RT PCR NOT DETECTED    Blood Cx: pending   Urine Cx: pending     EKG:  I have reviewed the EKG with the following interpretation:   Sinus tachycardia, LAD, normal interval, no acute ST segment changes concerning for acute ischemia     RADIOLOGY  CT ABDOMEN PELVIS WO CONTRAST Additional Contrast? None   Final Result   Patchy ground-glass opacities at the lung bases for which multifocal   pneumonia should be considered, likely an atypical or viral pneumonia. Cholelithiasis. The gallbladder is moderately dilated with a few tiny   calcified gallstones. No findings to suggest acute cholecystitis. Mild gas and stool load. XR CHEST PORTABLE   Final Result   No acute cardiopulmonary process. Pertinent previous results reviewed   None     ASSESSMENT/PLAN:  Sepsis (POA- leukopenia, LA, febrile, hypotension, source)  - suspect source is UTI  - PCT elevated   - BP low, no need for pressors at this time   - Continue IVF, LA resolved   - Blood and Urine Cx pending   - Continue Vanc/Cefepime D#1    Abnormal Chest CT  - CT with patchy ground glass opacities at the lung bases-> multifocal PNA vs viral PNA-->does have a hx of COVID in Nov 2020   - COVID PCR is negative   - No PNA symptoms and lungs clear on exam  - Likely residual changes from prior COVID infection?  -Continue vancomycin and cefepime for now.     Leukopenia  - in the setting of acute sepsis   - Monitor   -  Blood Cx pending     Possible UTI  - Recent cystoscopy on Friday at [facility]   - UA was negative for Nitrite or LE but does show WBC and Bacteria  - Urine Cx pending  - Continue IV Abx as above     TCP  - baseline PLT ~140  - PLT on admission 103   - No bleeding, okay to have PPx Lovenox     CKD Stage III  - Baseline creatinine ~1.5  - Creatinine on admission  1.4   - stable     DM2  - Hold home oral Rx   - Continue SSI and Lantus   - Hgb A1c: pending      HLD  - Continue statin     Gout  - no acute flare   - Continue home allopurinol     Chronic Back pain   Neuropathy   - Continue Lyrica     DVT Prophylaxis: Lovenox   Diet: DIET CARB CONTROL;   Code Status: Full Code      Maddison Chol, PA-C  5/9/2021 7:44 AM    Sepsis. Likely secondary UTI from recent instrumentation. IV cefepime and vancomycin. Urine culture. Continue IV fluids. Abnormal chest CT. Likely secondary to Covid pneumonia 11/2020  Denies any cough or shortness of breath. CHASIYT Hunter.

## 2021-05-09 NOTE — ED PROVIDER NOTES
CHIEF COMPLAINT  Altered Mental Status (pt arrives via EMS, pt was out mowing grass came inside around 8pm. pt unable to walk, urinated on himself, fever 103.0  EMS States he was unable to get out of bed when they got there. pt A&O X4. pt had cystoscopy yesterday.)      HISTORY OF PRESENT ILLNESS  Pankaj López is a 65 y.o. male with a history of CKD, DM, HTN, TIA, TURP, status post recent cystoscopy who presents to the ED complaining of generalized weakness. Patient came inside around 8 PM from mowing his grass and reported crampy discomfort in his lower abdomen which he attributed to gas. He then reports generalized weakness such that he was too weak to stand and ambulate. He was also incontinent of urine. Febrile on arrival at 100.3. He denies chest pain, dyspnea, cough, nausea, vomiting, upper abdominal pain, diarrhea, constipation. No other complaints, modifying factors or associated symptoms. I have reviewed the following from the nursing documentation. Past Medical History:   Diagnosis Date    Carotid artery occlusion 3/10    60-69% left (neg stress test 5/10)    Cerebral artery occlusion with cerebral infarction [facility])     Cerebrovascular disease 3/10    ? TIA    Chronic back pain      Dr Chastity Shell - pain management    COVID-19 11/20/2020    Gout     Hyperlipidemia     Hypertension     Routine health maintenance     Type II or unspecified type diabetes mellitus without mention of complication, not stated as uncontrolled     eye exam 5/30     Past Surgical History:   Procedure Laterality Date    CATARACT REMOVAL WITH IMPLANT Right     CYSTOSCOPY      [place] REOPER, CAROTID ENDARTEC>1 MON Left 8/27/2018    LEFT CAROTID ENDARTERECTOMY performed by Pablo Hernandez MD at [address] [facility] [address]      neg prostate biopsy 1-09    SHOULDER SURGERY      JOINT CLEANING    TURP  2017     History reviewed. No pertinent family history.   Social History     Socioeconomic History    Marital status:      Spouse name: Brad Ibrahim Number of children: 4    Years of education: Not on file    Highest education level: Not on file   Occupational History    Not on file   Social Needs    Financial resource strain: Not on file    Food insecurity     Worry: Not on file     Inability: Not on file   [employer] needs     Medical: Not on file     Non-medical: Not on file   Tobacco Use    Smoking status: Never Smoker    Smokeless tobacco: Never Used   Substance and Sexual Activity    Alcohol use: No    Drug use: No    Sexual activity: Yes     Partners: Female   Lifestyle    Physical activity     Days per week: Not on file     Minutes per session: Not on file    Stress: Not on file   Relationships    Social connections     Talks on phone: Not on file     Gets together: Not on file     Attends Christian service: Not on file     Active member of club or organization: Not on file     Attends meetings of clubs or organizations: Not on file     Relationship status: Not on file    Intimate partner violence     Fear of current or ex partner: Not on file     Emotionally abused: Not on file     Physically abused: Not on file     Forced sexual activity: Not on file   Other Topics Concern    Not on file   Social History Narrative    Not on file     No current facility-administered medications for this encounter.       Current Outpatient Medications   Medication Sig Dispense Refill    empagliflozin (JARDIANCE) 25 MG tablet Take 25 mg by mouth daily      glipiZIDE (GLUCOTROL) 5 MG tablet Take 5 mg by mouth 2 times daily (before meals)      INSULIN GLARGINE, 1 UNIT DIAL, SC Inject into the skin      metFORMIN (GLUCOPHAGE) 500 MG tablet Take 500 mg by mouth 2 times daily (with meals)      allopurinol (ZYLOPRIM) 100 MG tablet Take 100 mg by mouth daily      VITAMIN D PO Take by mouth      vitamin B-12 (CYANOCOBALAMIN) 1000 MCG tablet Take 1,000 mcg by mouth daily      metoprolol succinate (TOPROL XL) 25 MG extended release tablet Take 1 tablet by mouth daily 90 tablet 3    metoprolol succinate (TOPROL XL) 25 MG extended release tablet Take 0.5 tablets by mouth daily 90 tablet 3    metoprolol tartrate (LOPRESSOR) 25 MG tablet Take 1 tablet by mouth 2 times daily (Patient not taking: Reported on 12/10/2020) 60 tablet 3    tamsulosin (FLOMAX) 0.4 MG capsule Take 0.4 mg by mouth nightly      traMADol (ULTRAM) 50 MG tablet Take 50 mg by mouth every 8 hours as needed for Pain. 1 to 2 tablets for pain      aspirin 81 MG chewable tablet Take 1 tablet by mouth daily 30 tablet 3    docusate sodium (COLACE) 100 MG capsule Take 1 capsule by mouth daily 30 capsule 3    atorvastatin (LIPITOR) 10 MG tablet Take 20 mg by mouth daily      insulin glargine (LANTUS) 100 UNIT/ML injection Inject 10 Units into the skin nightly. (Patient taking differently: Inject 20 Units into the skin 2 times daily ) 3 mL 12    pregabalin (LYRICA) 100 MG capsule Take 200 mg by mouth 2 times daily. Allergies   Allergen Reactions    Lisinopril      \"Makes me feel goofy\"       REVIEW OF SYSTEMS  10 systems reviewed, pertinent positives per HPI otherwise noted to be negative. PHYSICAL EXAM  BP (!) 90/58   Pulse 80   Temp 100.3 °F (37.9 °C) (Oral)   Resp 16   Ht 5' 7\" (1.702 m)   Wt 166 lb (75.3 kg)   SpO2 96%   BMI 26.00 kg/m²   GENERAL APPEARANCE: Awake and alert. Cooperative. No acute distress. HEAD: Normocephalic. Atraumatic. EYES: PERRL. EOM's grossly intact. ENT: Mucous membranes are moist.   NECK: Supple, trachea midline. HEART: Tachycardia, rate 104. Normal S1, S2. No murmurs, rubs or gallops. LUNGS: Respirations unlabored. CTAB. Good air exchange. No wheezes, rales, or rhonchi. Speaking comfortably in full sentences. ABDOMEN: Soft. Non-distended. Mild suprapubic tenderness. No guarding or rebound. Normal Bowel sounds. Incontinent of urine. EXTREMITIES: No peripheral edema. MAEE.  No acute deformities. SKIN: Warm and dry. No acute rashes. NEUROLOGICAL: Alert and oriented X 3. CN II-XII intact. No gross facial drooping. Strength 5/5, sensation intact. No pronator drift. Normal coordination. PSYCHIATRIC: Normal mood and affect. LABS  I have reviewed all labs for this visit.    Results for orders placed or performed during the hospital encounter of 05/08/21   CBC Auto Differential   Result Value Ref Range    WBC 2.6 (L) 4.0 - 11.0 K/uL    RBC 4.18 (L) 4.20 - 5.90 M/uL    Hemoglobin 13.6 13.5 - 17.5 g/dL    Hematocrit 39.9 (L) 40.5 - 52.5 %    MCV 95.4 80.0 - 100.0 fL    MCH 32.6 26.0 - 34.0 pg    MCHC 34.2 31.0 - 36.0 g/dL    RDW 14.6 12.4 - 15.4 %    Platelets 365 (L) 329 - 450 K/uL    MPV 8.0 5.0 - 10.5 fL    Neutrophils % 85.6 %    Lymphocytes % 10.6 %    Monocytes % 2.3 %    Eosinophils % 1.2 %    Basophils % 0.3 %    Neutrophils Absolute 2.2 1.7 - 7.7 K/uL    Lymphocytes Absolute 0.3 (L) 1.0 - 5.1 K/uL    Monocytes Absolute 0.1 0.0 - 1.3 K/uL    Eosinophils Absolute 0.0 0.0 - 0.6 K/uL    Basophils Absolute 0.0 0.0 - 0.2 K/uL   Comprehensive Metabolic Panel w/ Reflex to MG   Result Value Ref Range    Sodium 134 (L) 136 - 145 mmol/L    Potassium reflex Magnesium 4.2 3.5 - 5.1 mmol/L    Chloride 99 99 - 110 mmol/L    CO2 23 21 - 32 mmol/L    Anion Gap 12 3 - 16    Glucose 185 (H) 70 - 99 mg/dL    BUN 29 (H) 7 - 20 mg/dL    CREATININE 1.4 (H) 0.8 - 1.3 mg/dL    GFR Non-African American 50 (A) >60    GFR African American >60 >60    Calcium 9.8 8.3 - 10.6 mg/dL    Total Protein 7.1 6.4 - 8.2 g/dL    Albumin 4.3 3.4 - 5.0 g/dL    Albumin/Globulin Ratio 1.5 1.1 - 2.2    Total Bilirubin 0.7 0.0 - 1.0 mg/dL    Alkaline Phosphatase 176 (H) 40 - 129 U/L    ALT 11 10 - 40 U/L    AST 16 15 - 37 U/L    Globulin 2.8 g/dL   Troponin   Result Value Ref Range    Troponin <0.01 <0.01 ng/mL   Urinalysis Reflex to Culture    Specimen: Urine, clean catch   Result Value Ref Range    Color, UA Yellow Straw/Yellow Clarity, UA Clear Clear    Glucose, Ur >=1000 (A) Negative mg/dL    Bilirubin Urine Negative Negative    Ketones, Urine TRACE (A) Negative mg/dL    Specific Gravity, UA 1.015 1.005 - 1.030    Blood, Urine MODERATE (A) Negative    pH, UA 7.5 5.0 - 8.0    Protein, UA 30 (A) Negative mg/dL    Urobilinogen, Urine 1.0 <2.0 E.U./dL    Nitrite, Urine Negative Negative    Leukocyte Esterase, Urine Negative Negative    Microscopic Examination YES     Urine Type NotGiven     Urine Reflex to Culture Yes    Lactic Acid, Plasma   Result Value Ref Range    Lactic Acid 2.7 (H) 0.4 - 2.0 mmol/L   Procalcitonin   Result Value Ref Range    Procalcitonin 0.68 (H) 0.00 - 0.15 ng/mL   Microscopic Urinalysis   Result Value Ref Range    Mucus, UA 1+ (A) None Seen /LPF    WBC, UA 21-50 (A) 0 - 5 /HPF    RBC, UA 21-50 (A) 0 - 4 /HPF    Epithelial Cells, UA 0-1 0 - 5 /HPF    Bacteria, UA 2+ (A) None Seen /HPF   POCT Glucose   Result Value Ref Range    POC Glucose 177 (H) 70 - 99 mg/dl    Performed on ACCU-CHEK        EKG  Sinus tachycardia, rate 104, left axis deviation, QTC within normal limits, no acute ST changes or T wave inversions. RADIOLOGY  X-RAYS:  I have reviewed radiologic plain film image(s). ALL OTHER NON-PLAIN FILM IMAGES SUCH AS CT, ULTRASOUND AND MRI HAVE BEEN READ BY THE RADIOLOGIST. CT ABDOMEN PELVIS WO CONTRAST Additional Contrast? None   Final Result   Patchy ground-glass opacities at the lung bases for which multifocal   pneumonia should be considered, likely an atypical or viral pneumonia. Cholelithiasis. The gallbladder is moderately dilated with a few tiny   calcified gallstones. No findings to suggest acute cholecystitis. Mild gas and stool load. XR CHEST PORTABLE   Final Result   No acute cardiopulmonary process. Rechecks: Physical assessment performed. Resting comfortably on reevaluation.   Reports hypotension is a frequent occurrence for him and believes it may not be related to his infection. Critical Care: 35min. Management of suspected sepsis with unstable vital signs requiring IV antibiotics fluids and frequent reevaluation. ED COURSE/MDM  Patient seen and evaluated. Old records reviewed. Labs and imaging reviewed and results discussed with patient. Patient arrives with generalized weakness, fever, tachycardia. Has had some low blood pressures in the ER. Lactic acid and procalcitonin are also somewhat elevated. Found to have acute UTI. Concern for potential sepsis. Patient responding to IV fluid boluses and started on broad-spectrum antibiotics. Admitted to the hospitalist service. New Prescriptions    No medications on file       CLINICAL IMPRESSION  1. Sepsis secondary to UTI ([facility] [address]        Blood pressure (!) 90/58, pulse 80, temperature 100.3 °F (37.9 °C), temperature source Oral, resp. rate 16, height 5' 7\" (1.702 m), weight 166 lb (75.3 kg), SpO2 96 %. Fausto Robison was admitted in stable condition.         Amanda Skelton MD  05/09/21 [id number]

## 2021-05-09 NOTE — FLOWSHEET NOTE
05/09/21 1945   Vital Signs   Temp 98.3 °F (36.8 °C)   Temp Source Axillary   Pulse 72   Heart Rate Source Monitor   Resp 18   BP (!) 124/59   Patient Position Semi fowlers   Level of Consciousness Alert (0)   MEWS Score 1   Oxygen Therapy   SpO2 94 %   O2 Device None (Room air)   Pt. Resting in bed. Call light in reach. Shift assessment completed see flow sheet. Denies any needs at this time. Will continue to monitor.

## 2021-05-09 NOTE — CONSULTS
Pharmacy Note  Vancomycin Consult    Candi Crane is a 65 y.o. male started on Vancomycin for UTI; consult received from Dr. Earnest Connors to manage therapy. Also receiving the following antibiotics: Cefepime. 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)    Pneumonia due to COVID-19 virus    Generalized weakness    COVID-19    Stage 3a chronic kidney disease    Acute respiratory failure with hypoxia (HCC)    NSVT (nonsustained ventricular tachycardia) (HCC)    Sepsis (HCC)     Allergies:  Lisinopril     Temp max:     Recent Labs     05/08/21  2320   BUN 29*   CREATININE 1.4*   WBC 2.6*       Intake/Output Summary (Last 24 hours) at 5/9/2021 0550  Last data filed at 5/9/2021 0329  Gross per 24 hour   Intake 2300 ml   Output --   Net 2300 ml     Culture Date      Source                       Results      Ht Readings from Last 1 Encounters:   05/08/21 5' 7\" (1.702 m)        Wt Readings from Last 1 Encounters:   05/08/21 166 lb (75.3 kg)       Body mass index is 26 kg/m². Estimated Creatinine Clearance: 45 mL/min (A) (based on SCr of 1.4 mg/dL (H)). Goal Trough Level: 13-19 mcg/mL    Assessment/Plan:  Patient received Vancomycin 1500 mg x 1 dose in ER. Start Vancomycin 1000 mg IV every 18 hours. Timing of trough level will be determined based on culture results, renal function, and clinical response. Vanco trough ordered for 0730 on 5/11/21. Thank you for the consult. Will continue to follow.

## 2021-05-09 NOTE — PROGRESS NOTES
Patient admitted to room 325 from ED. Patient oriented to room, call light, bed rails, phone, lights and bathroom. Patient instructed about the schedule of the day including: vital sign frequency, lab draws, possible tests, frequency of MD and staff rounds, daily weights, I &O's and prescribed diet. bed alarm in place, patient aware of placement and reason. Telemetry box in place, patient aware of placement and reason. Bed locked, in lowest position, side rails up 2/4, call light within reach. Recliner Assessment  Patient is able to demonstrate the ability to move from a reclining position to an upright position within the recliner. 4 Eyes Skin Assessment     The patient is being assess for   Admission    I agree that 2 RN's have performed a thorough Head to Toe Skin Assessment on the patient. ALL assessment sites listed below have been assessed. Areas assessed by both nurses:   [x]   Head, Face, and Ears   [x]   Shoulders, Back, and Chest, Abdomen  [x]   Arms, Elbows, and Hands   [x]   Coccyx, Sacrum, and Ischium  [x]   Legs, Feet, and Heels        Scattered bruising/abraisions     **SHARE this note so that the co-signing nurse is able to place an eSignature**    Co-signer eSignature: Electronically signed by Isra Jackson RN on 5/9/21 at 5:40 AM EDT    Does the Patient have Skin Breakdown?   No          Jacques Prevention initiated:  NA   Wound Care Orders initiated:  NA      [facility] nurse consulted for Pressure Injury (Stage 3,4, Unstageable, DTI, NWPT, Complex wounds)and New or Established Ostomies:  NA      Primary Nurse eSignature: Electronically signed by Karina Crain RN on 5/9/21 at 5:39 AM EDT

## 2021-05-09 NOTE — PROGRESS NOTES
Admission assessment complete, pt oriented to room. Call light and bed side table in reach. Vss. Pt states no further needs at this time. Will continue to monitor.

## 2021-05-09 NOTE — PROGRESS NOTES
Bedside report and transfer of care given to Emilee Blanchard, [address] [place] [address]. Pt currently resting in bed with the call light within reach. Pt denies any other care needs at this time. Pt stable at this time.     Donya Brown RN

## 2021-05-09 NOTE — PROGRESS NOTES
Pt assessment complete; see flow sheets. Vitals signs completed and stable; see flow sheets. Meds given per MAR. Pt currently resting in bed with the call light within reach. Pt denies any other care needs at this time. Pt stable at this time.     Jorge Echols RN

## 2021-05-10 LAB
ANION GAP SERPL CALCULATED.3IONS-SCNC: 12 MMOL/L (ref 3–16)
BASOPHILS ABSOLUTE: 0 K/UL (ref 0–0.2)
BASOPHILS RELATIVE PERCENT: 0.3 %
BUN BLDV-MCNC: 23 MG/DL (ref 7–20)
CALCIUM SERPL-MCNC: 8.6 MG/DL (ref 8.3–10.6)
CHLORIDE BLD-SCNC: 107 MMOL/L (ref 99–110)
CO2: 20 MMOL/L (ref 21–32)
CREAT SERPL-MCNC: 1.4 MG/DL (ref 0.8–1.3)
EOSINOPHILS ABSOLUTE: 0 K/UL (ref 0–0.6)
EOSINOPHILS RELATIVE PERCENT: 1.2 %
ESTIMATED AVERAGE GLUCOSE: 269 MG/DL
GFR AFRICAN AMERICAN: >60
GFR NON-AFRICAN AMERICAN: 50
GLUCOSE BLD-MCNC: 108 MG/DL (ref 70–99)
GLUCOSE BLD-MCNC: 136 MG/DL (ref 70–99)
GLUCOSE BLD-MCNC: 172 MG/DL (ref 70–99)
GLUCOSE BLD-MCNC: 192 MG/DL (ref 70–99)
GLUCOSE BLD-MCNC: 83 MG/DL (ref 70–99)
HBA1C MFR BLD: 11 %
HCT VFR BLD CALC: 34.3 % (ref 40.5–52.5)
HEMOGLOBIN: 11.7 G/DL (ref 13.5–17.5)
LYMPHOCYTES ABSOLUTE: 0.6 K/UL (ref 1–5.1)
LYMPHOCYTES RELATIVE PERCENT: 17.9 %
MCH RBC QN AUTO: 32.7 PG (ref 26–34)
MCHC RBC AUTO-ENTMCNC: 34.2 G/DL (ref 31–36)
MCV RBC AUTO: 95.7 FL (ref 80–100)
MONOCYTES ABSOLUTE: 0.1 K/UL (ref 0–1.3)
MONOCYTES RELATIVE PERCENT: 2.2 %
NEUTROPHILS ABSOLUTE: 2.5 K/UL (ref 1.7–7.7)
NEUTROPHILS RELATIVE PERCENT: 78.4 %
PDW BLD-RTO: 14.6 % (ref 12.4–15.4)
PERFORMED ON: ABNORMAL
PERFORMED ON: NORMAL
PLATELET # BLD: 100 K/UL (ref 135–450)
PMV BLD AUTO: 8.1 FL (ref 5–10.5)
POTASSIUM REFLEX MAGNESIUM: 3.9 MMOL/L (ref 3.5–5.1)
RBC # BLD: 3.58 M/UL (ref 4.2–5.9)
SODIUM BLD-SCNC: 139 MMOL/L (ref 136–145)
WBC # BLD: 3.2 K/UL (ref 4–11)

## 2021-05-10 PROCEDURE — 6370000000 HC RX 637 (ALT 250 FOR IP): Performed by: INTERNAL MEDICINE

## 2021-05-10 PROCEDURE — 85025 COMPLETE CBC W/AUTO DIFF WBC: CPT

## 2021-05-10 PROCEDURE — 97116 GAIT TRAINING THERAPY: CPT

## 2021-05-10 PROCEDURE — 97161 PT EVAL LOW COMPLEX 20 MIN: CPT

## 2021-05-10 PROCEDURE — 6360000002 HC RX W HCPCS: Performed by: HOSPITALIST

## 2021-05-10 PROCEDURE — 97165 OT EVAL LOW COMPLEX 30 MIN: CPT

## 2021-05-10 PROCEDURE — 6370000000 HC RX 637 (ALT 250 FOR IP): Performed by: HOSPITALIST

## 2021-05-10 PROCEDURE — 2580000003 HC RX 258: Performed by: HOSPITALIST

## 2021-05-10 PROCEDURE — 97535 SELF CARE MNGMENT TRAINING: CPT

## 2021-05-10 PROCEDURE — 2060000000 HC ICU INTERMEDIATE R&B

## 2021-05-10 PROCEDURE — 36415 COLL VENOUS BLD VENIPUNCTURE: CPT

## 2021-05-10 PROCEDURE — 2580000003 HC RX 258: Performed by: PHYSICIAN ASSISTANT

## 2021-05-10 PROCEDURE — 80048 BASIC METABOLIC PNL TOTAL CA: CPT

## 2021-05-10 PROCEDURE — 99232 SBSQ HOSP IP/OBS MODERATE 35: CPT | Performed by: PHYSICIAN ASSISTANT

## 2021-05-10 RX ADMIN — PREGABALIN 300 MG: 100 CAPSULE ORAL at 07:47

## 2021-05-10 RX ADMIN — VANCOMYCIN HYDROCHLORIDE 1000 MG: 1 INJECTION, POWDER, LYOPHILIZED, FOR SOLUTION INTRAVENOUS at 14:57

## 2021-05-10 RX ADMIN — CEFEPIME 2000 MG: 2 INJECTION, POWDER, FOR SOLUTION INTRAVENOUS at 11:36

## 2021-05-10 RX ADMIN — TRAMADOL HYDROCHLORIDE 50 MG: 50 TABLET ORAL at 18:02

## 2021-05-10 RX ADMIN — SODIUM CHLORIDE: 9 INJECTION, SOLUTION INTRAVENOUS at 11:29

## 2021-05-10 RX ADMIN — ATORVASTATIN CALCIUM 20 MG: 10 TABLET, FILM COATED ORAL at 07:47

## 2021-05-10 RX ADMIN — TAMSULOSIN HYDROCHLORIDE 0.4 MG: 0.4 CAPSULE ORAL at 20:21

## 2021-05-10 RX ADMIN — Medication 10 ML: at 20:23

## 2021-05-10 RX ADMIN — CEFEPIME 2000 MG: 2 INJECTION, POWDER, FOR SOLUTION INTRAVENOUS at 22:45

## 2021-05-10 RX ADMIN — ACETAMINOPHEN 650 MG: 325 TABLET ORAL at 01:50

## 2021-05-10 RX ADMIN — Medication 10 ML: at 07:48

## 2021-05-10 RX ADMIN — ASPIRIN 81 MG: 81 TABLET, CHEWABLE ORAL at 07:47

## 2021-05-10 RX ADMIN — TRAMADOL HYDROCHLORIDE 50 MG: 50 TABLET ORAL at 08:42

## 2021-05-10 RX ADMIN — SODIUM CHLORIDE: 9 INJECTION, SOLUTION INTRAVENOUS at 22:49

## 2021-05-10 RX ADMIN — SODIUM CHLORIDE: 9 INJECTION, SOLUTION INTRAVENOUS at 01:46

## 2021-05-10 RX ADMIN — ACETAMINOPHEN 650 MG: 325 TABLET ORAL at 22:49

## 2021-05-10 RX ADMIN — PREGABALIN 300 MG: 100 CAPSULE ORAL at 20:21

## 2021-05-10 RX ADMIN — ALLOPURINOL 100 MG: 100 TABLET ORAL at 07:47

## 2021-05-10 RX ADMIN — INSULIN GLARGINE 20 UNITS: 100 INJECTION, SOLUTION SUBCUTANEOUS at 11:39

## 2021-05-10 RX ADMIN — ACETAMINOPHEN 650 MG: 325 TABLET ORAL at 07:47

## 2021-05-10 ASSESSMENT — PAIN DESCRIPTION - PROGRESSION
CLINICAL_PROGRESSION: NOT CHANGED
CLINICAL_PROGRESSION: NOT CHANGED

## 2021-05-10 ASSESSMENT — PAIN DESCRIPTION - DESCRIPTORS
DESCRIPTORS: HEADACHE
DESCRIPTORS: ACHING
DESCRIPTORS: ACHING

## 2021-05-10 ASSESSMENT — PAIN - FUNCTIONAL ASSESSMENT
PAIN_FUNCTIONAL_ASSESSMENT: PREVENTS OR INTERFERES SOME ACTIVE ACTIVITIES AND ADLS
PAIN_FUNCTIONAL_ASSESSMENT: PREVENTS OR INTERFERES SOME ACTIVE ACTIVITIES AND ADLS
PAIN_FUNCTIONAL_ASSESSMENT: ACTIVITIES ARE NOT PREVENTED

## 2021-05-10 ASSESSMENT — PAIN SCALES - GENERAL
PAINLEVEL_OUTOF10: 8
PAINLEVEL_OUTOF10: 7
PAINLEVEL_OUTOF10: 9
PAINLEVEL_OUTOF10: 3
PAINLEVEL_OUTOF10: 7

## 2021-05-10 ASSESSMENT — PAIN DESCRIPTION - ORIENTATION
ORIENTATION: LOWER
ORIENTATION: LOWER

## 2021-05-10 ASSESSMENT — PAIN DESCRIPTION - LOCATION
LOCATION: BACK
LOCATION: HEAD

## 2021-05-10 ASSESSMENT — PAIN DESCRIPTION - PAIN TYPE
TYPE: CHRONIC PAIN
TYPE: ACUTE PAIN
TYPE: CHRONIC PAIN
TYPE: CHRONIC PAIN

## 2021-05-10 ASSESSMENT — PAIN DESCRIPTION - FREQUENCY
FREQUENCY: CONTINUOUS
FREQUENCY: CONTINUOUS

## 2021-05-10 ASSESSMENT — PAIN DESCRIPTION - ONSET
ONSET: ON-GOING
ONSET: ON-GOING

## 2021-05-10 NOTE — PROGRESS NOTES
Inpatient Physical Therapy Evaluation and Treatment    Unit: PCU  Date:  5/10/2021  Patient Name:    Candi Crane  Admitting diagnosis:  Sepsis [facility]) [A41.9]  Admit Date:  5/8/2021  Precautions/Restrictions/WB Status/ Lines/ Wounds/ Oxygen: Fall risk, Bed/chair alarm, Lines -IV, Telemetry and Continuous pulse oximetry    Treatment Time: 1030 - 1054  Treatment Number:  1   Timed Code Treatment Minutes: 14 minutes  Total Treatment Minutes:  24  minutes    Patient Goals for Therapy: \" go home \"          Discharge Recommendations: Home PRN assist and with home PT   DME needs for discharge: Needs Met       Therapy recommendation for EMS Transport: can transport by wheelchair    Therapy recommendations for staff:   Stand by assist with use of rolling walker (RW) and gait belt for all transfers and ambulation to/from chair  to/from bathroom  within room    History of Present Illness: (H & P taken Bharati Rust MD's note dated 5/9/2021)  The patient is a 65 y.o. male with chronic pain, hx of COVID-19 infection, Gout, HTN, HLD, DM2 who presented to [facility] ED with complaint of weakness and fevers. Patient reports that he had a cystoscopy at the [place] on Friday as an outpatient procedure. Has been having difficulty peeing and reports they were looking for any obstruction. He went home on Friday feeling well, but after mowing his yard yesterday, he went in and sat down to talk with his family and started feeling very cold and shaking. He went in to his bed and his wife covered him with a lot of blankets and he said about 15 mins later he wasn't shaking anymore. He went to try to get up but was too weak and even with his 2 sons help, he still couldn't get up. He reports some dysuria but no abdominal pain, CP cough, SOB, N/V.    Assessment and plan for : sepsis, UTI, leukopenia    Home Health S4 Level Recommendation:  Level 1 Standard  AM-PAC Mobility Score    AM-PAC Inpatient Mobility Raw Score : 20     Preadmission Environment    Pt. Abiodun Bateman spouse and son  Home environment:   one story home with a basement (all needs on 1st floor)   Steps to enter first floor:   Ramp           Steps to basement: Full flight of 12-13  Bathroom:       Walk-in Shower and built in shower seat and grab bars   Equipment owned:      SPC, Rolling Walker (RW), Rollator  and manual W/C      Preadmission Status / PLOF:  History of falls             No  Pt. Able to drive          Yes  Pt Fully independent with ADL's         Yes  Pt. Required assistance from family for:  Independent PTA    Pt. Fully independent for transfers and gait and walked with: no AD in home, rollator in the community.       Pain   No    Cognition    A&O x4   Able to follow 2 step commands    Subjective  Patient lying supine in bed with no family present. Pt agreeable to this PT eval & tx. Upper Extremity ROM/Strength  Please see OT evaluation.       Lower Extremity ROM / Strength   AROM WFL: Yes  ROM limitations: N/A    Strength Assessment (measured on a 0-5 scale):  R LE   Quad   4+   Ant Tib  4+   Hamstring 4+   Iliopsoas 4+  L LE  Quad   5   Ant Tib  5   Hamstring 5   Iliopsoas 5    Lower Extremity Sensation    WFL    Lower Extremity Proprioception:   WFL    Coordination and Tone  WFL    Balance  Sitting:  Normal; Modified Independent  Comments:     Standing: Fair +; SBA  Comments: ~5 min    Bed Mobility   Supine to Sit:    Supervision  Sit to Supine:   Not Tested  Rolling:   Not Tested  Scooting in sitting: Supervision  Scooting in supine:  Not Tested    Transfer Training     Sit to stand:   SBA  Stand to sit:   SBA  Bed to Chair:   SBA with use of gait belt and rolling walker (RW)    Gait gait completed as indicated below  Distance:      40 ft  Deviations (firm surface/linoleum):  decreased ml  Assistive Device Used:    gait belt and rolling walker (RW)  Level of Assist:    SBA  Comment:     Stair Training deferred, pt does not have stairs in home environment    Activity Tolerance   Pt completed therapy session with No adverse symptoms noted w/activity    Positioning Needs   Pt reclined in chair, alarm set, positioned in proper neutral alignment and pressure relief provided. Call light provided and all needs within reach    Exercises Initiated  all completed bilaterally unless indicated  Ankle Pumps x 10 reps    Other  None. Patient/Family Education   Pt educated on role of inpatient PT, POC, importance of continued activity, DC recommendations, safety awareness, transfer techniques, energy conservation, pacing activity and calling for assist with mobility. Assessment  Pt seen for Physical Therapy evaluation in acute care setting. Pt demonstrated decreased Activity tolerance, Balance, Safety and Strength as well as decreased independence with Ambulation and Transfers. Recommending Home PRN assist and with home PT upon discharge as patient functioning close to baseline level and would benefit from continued therapy services    Goals : To be met in 3 visits:  1). Independent with LE Ex x 10 reps    To be met in 6 visits:  1). Supine to/from sit: Independent  2). Sit to/from stand: Independent  3). Bed to chair: Independent  4). Gait: Ambulate 150 ft.  with  Modified Independent and use of LRAD (least restrictive assistive device)  5). Tolerate B LE exercises 3 sets of 10-15 reps    Rehabilitation Potential: Good  Strengths for achieving goals include:   Pt motivated, PLOF, Family Support and Pt cooperative   Barriers to achieving goals include:    No Barriers    Plan    To be seen 2-3 x / week  while in acute care setting for therapeutic exercises, bed mobility, transfers, progressive gait training, balance training, and family/patient education. Signature: Izabella Almazan MS PT, # [id number]    If patient discharges from this facility prior to next visit, this note will serve as the Discharge Summary.

## 2021-05-10 NOTE — PROGRESS NOTES
Pt has been educated to hospital falls prevention policy. They are aware they will be assessed every shift, and with any condition changes, by the nursing staff on their ability to perform their ADL's without need for assistance. Pt understands that based on the number of their score they are given a base score that will assign a level of low, medium, or high risk for falls. This patient has rated a high which requires a bed / chair alarm for their safety to prevent a fall. The patient is alert and oriented, and acknowledges and understands the need for intervention but refuses the application and use of the bed / chair alarm that is required per policy. Pt agrees to use call light and wait for help to arrive to assist them to get up when they need to. Call light is within reach and all other safety measures in place. Will continue to monitor.   Eladio Hancock RN

## 2021-05-10 NOTE — PROGRESS NOTES
Inpatient Occupational Therapy  Evaluation and Treatment    Unit: PCU  Date:  5/10/2021  Patient Name:    Maryan Licona  Admitting diagnosis:  Sepsis [facility]) [A41.9]  Admit Date:  5/8/2021  Precautions/Restrictions/WB Status/ Lines/ Wounds/ Oxygen: fall risk, IV, bed/chair alarm and telemetry    Treatment Time:  10:30-10:53  Treatment Number: 1     Billable Treatment Time: 13 minutes   Total Treatment Time:   23   minutes    Patient Goals for Therapy:  \" to go home \"      Discharge Recommendations: Home with PRN assist and home therapy  DME needs for discharge: Needs Met       Therapy recommendations for staff:   Assist of 1 with use of rolling walker (RW) for all ambulation to/from bathroom    History of Present Illness: 65 y.o. male with a history of CKD, DM, HTN, TIA, TURP, status post recent cystoscopy who presents to the ED complaining of generalized weakness. Patient came inside around 8 PM from mowing his grass and reported crampy discomfort in his lower abdomen which he attributed to gas. He then reports generalized weakness such that he was too weak to stand and ambulate. He was also incontinent of urine. Febrile on arrival at 100.3. He denies chest pain, dyspnea, cough, nausea, vomiting, upper abdominal pain, diarrhea, constipation. Home Health S4 Level Recommendation:  Level 1 Standard  AM-PAC Score: AM-PAC Inpatient Daily Activity Raw Score: 21    Preadmission Environment    Pt. Abimbola Brought spouse and son  Home environment:   one story home with a basement (all needs on 1st floor)   Steps to enter first floor:   Ramp           Steps to basement: Full flight of 12-13  Bathroom:       Walk-in Shower and built in shower seat and grab bars   Equipment owned:      SPC, Rolling Walker (RW), Rollator  and manual W/C      Preadmission Status / PLOF:  History of falls             No  Pt. Able to drive          Yes  Pt Fully independent with ADL's         Yes  Pt.  Required assistance from family for:  Independent PTA    Pt. Fully independent for transfers and gait and walked with: no AD in home, rollator in the community.       Pain  No  Rating:NA  Location:  Pain Medicine Status: Denies need      Cognition    A&O x4   Able to follow 2 step commands    Subjective  Patient lying supine in bed with no family present. Pt agreeable to this OT eval & tx. Upper Extremity ROM:    WFL,  pt able to perform all bed mobility, transfers, and gait without ROM limitation. Upper Extremity Strength:    BUE strength impaired but not formally assessed w/ MMT    Upper Extremity Sensation    Impaired- reports numbness in L UE from CVA    Upper Extremity Proprioception:  WFL    Coordination and Tone  WFL    Balance  Functional Sitting Balance:  WFL  Functional Standing Balance:Diminished (supervision)     Bed mobility:    Supine to sit: Independent  Sit to supine:   Not Tested  Rolling:    Independent  Scooting in sitting:  Independent  Scooting to head of bed:   Not Tested    Bridging:   Not Tested    Transfers:    Sit to stand:  Supervision  Stand to sit:  Supervision  Bed to chair:   Supervision and with use of RW  Standard toilet: Supervision and with use of RW  Bed to [facility]:  Not Tested    Dressing:      UE:   Supervision to doff t-shirt  LE:    Supervision to don underwear     Bathing:    UE:  Supervision  LE:  Not Tested    Eating:   Not Tested    Toileting:  Supervision    Activity Tolerance   Pt completed therapy session with No adverse symptoms noted w/activity  SpO2:   HR:   BP:     Positioning Needs:   Up in chair, call light and needs in reach. Alarm Set    Exercise / Activities Initiated:   N/A    Patient/Family Education:   Role of OT  Recommendations for DC  Safe RW use/hand placement    Assessment of Deficits: Pt seen for Occupational therapy evaluation in acute care setting.   Pt demonstrated decreased Activity tolerance, ADLs, IADLs, Balance , Bathing, Bed mobility, Dressing, ROM, Safety Awareness, Strength, Transfers, Cognition and Coping Skills. Pt functioning below baseline and will likely benefit from skilled occupational therapy services to maximize safety and independence. Goal(s) : To be met in 3 Visits:  1). Bed to toilet/BSC: Modified Independent    To be met in 5 Visits:  1). Supine to/from Sit:  Independent  2). Upper Body Bathing:   Independent  3). Lower Body Bathing:   Independent  4). Upper Body Dressing:  Independent  5). Lower Body Dressing:  Independent  6). Pt to demonstrate UE exs x 15 reps with minimal cues    Rehabilitation Potential:  Good for goals listed above. Strengths for achieving goals include: Pt motivated, PLOF and Pt cooperative  Barriers to achieving goals include:  Complexity of condition     Plan: To be seen 2-3 x/wk  while in acute care setting for therapeutic exercises, bed mobility, transfers, dressing, bathing, family/patient education, ADL/IADL retraining, energy conservation training.        Kermitt Sicard, OTR/L #[id number]      If patient discharges from this facility prior to next visit, this note will serve as the Discharge Summary

## 2021-05-10 NOTE — PROGRESS NOTES
[facility] and is [facility] by cefepime. Will continue to monitor.

## 2021-05-10 NOTE — FLOWSHEET NOTE
05/10/21 0730   Vital Signs   Temp 97.8 °F (36.6 °C)   Temp Source Oral   Pulse 86   Heart Rate Source Monitor   Resp 16   /70   BP Location Left upper arm   Patient Position Semi fowlers   Level of Consciousness Alert (0)   MEWS Score 1   Patient Currently in Pain Yes   Pain Assessment   Pain Assessment 0-10   Pain Level 7   Pain Type Chronic pain   Pain Location Back   Pain Orientation Lower   Pain Frequency Continuous   Pain Onset On-going   Clinical Progression Not changed   Functional Pain Assessment Prevents or interferes some active activities and ADLs     AM assessment completed, see flowsheet. Patient resting in bed at this time. All needs met. Respirations easy and even at rest, lungs diminished. C/o pain at this time- see MAR. Bed in lowest position and locked, SR up x2. Call light and bedside table within reach.

## 2021-05-10 NOTE — ACP (ADVANCE CARE PLANNING)
Advance Care Planning   Healthcare Decision Maker:    Primary Decision Maker: Yue Patrick - [phone]    Click here to complete Healthcare Decision Makers including selection of the Healthcare Decision Maker Relationship (ie \"Primary\").

## 2021-05-10 NOTE — PROGRESS NOTES
Vancomycin Day: 2    Patient's labs, cultures, vitals, and vancomycin regimen reviewed. No changes today.   Trough due on 5/11 at [address] [address] D 5/10/67674:11 PM  .

## 2021-05-10 NOTE — CARE COORDINATION
Case Management Assessment  Initial Evaluation      Patient Name: Claudia Quinn  YOB: 1950  Diagnosis: Sepsis [facility]) [A41.9]  Date / Time: 5/8/2021 10:59 PM    Admission status/Date: 05/09/2021 Inpatient   Chart Reviewed: Yes      Patient Interviewed: Yes   Family Interviewed:  No      Hospitalization in the last 30 days:  No      Health Care Decision Maker :   Primary Decision Maker: Loraine Cortez - Spouse - [phone]    (CM - must 1st enter selection under Navigator - emergency contact- [place] Relationship and pick relationship)   Who do you trust or have selected to make healthcare decisions for you      Met with: pt   Interview conducted  (bedside/phone): bedside    Current PCP: Pt stated Ewelina Hernandez recently retired at the [employer] and he is unsure who he sees now    Financial  Medicare  Precert required for SNF : N          3 night stay required - N-WAIVEd    ADLS  Support Systems/Care Needs: Spouse/Significant Other, Children  Transportation: self    Meal Preparation: self    Housing  Living Arrangements: pt lives at home with wife and son (27years old)  Steps: Ramp  Intent for return to present living arrangements: Yes  Identified Issues: [id number] [facility] with [address] : No Agency:(Services)  Type of Home Care Services: None  Passport/Waiver : No  :                      Phone Number:    Passport/Waiver Services: n/a          Durable Medical Equiptment   DME Provider: n/a  Equipment:   Walker___Cane___RTS___ BSC___Shower Chair___Hospital Bed___W/C____Other__Rolator (as needed)______  02 at ____Liter(s)---wears(frequency)_______ HHN ___ CPAP___ BiPap___   N/A____      Home O2 Use :  No    If No for home O2---if presently on O2 during hospitalization:  No  if yes CM to follow for potential DC O2 need  Informed of need for care provider to bring portable home O2 tank on day of discharge for nursing to connect prior to leaving: Not Indicated  Verbalized agreement/Understanding:   Not Indicated    Community Service Affiliation  Dialysis:  No    · Agency:  · Location:  · Dialysis Schedule:  · Phone:   · Fax: Other Community Services: n/a    DISCHARGE PLAN: Explained Case Management role/services. Chart review completed. Met with pt at bedside. He stated he is independent and plans on returning home when discharged. PT/OT recommended Home with assist PRN and home PT/OT. Discussed this with pt and he stated that his wife/son are able to provide the PRN assist. He stated that he doesn't really want [address] for RN/OT/PT. Explained it may beneficial when discharged and he stated understanding. Provided him with a [address] list to review in case he changes his mind. Pt denied needs or questions for CM at this time. Notified JOANNA Syed that pt stated he is shaking and has a headache which is not normal for him. CM will follow. Please notify CM if needs or concerns arise.

## 2021-05-10 NOTE — PROGRESS NOTES
Physician Progress Note      PATIENT:               Berkley Briones  CSN #:                  [id number]  :                       1950  ADMIT DATE:       2021 10:59 PM  DISCH DATE:  RESPONDING  PROVIDER #:        Lenore GORE          QUERY TEXT:    Pt admitted with sepsis. Pt noted to have underwent a cystoscopy on Friday   for urinary obstruction. If possible, please document in progress notes and   discharge summary:    The medical record reflects the following:  Risk Factors: sepsis, UTI  Clinical Indicators: He underwent a cystoscopy on Friday for urinary   obstruction. Started noticing chills and severe weakness yesterday. Urine   culture >100,000 CFU/ml Serratia marcescens, blood culture POSITIVE for   Serratia marcescens  Treatment: vancomycin, cefepime, Blood and Urine Cx, monitor labs    Thank You Royce Mcmullen RN, [email or web address] [email or web address]  Options provided:  -- UTI as a postoperative complication  -- UIT as an expected/inherent condition that occurred postoperatively and not   a complication  -- UTI related to other incidental risk factor (please specify) occurring   following surgery and not a complication, Please document incidental risk   factor. -- Other - I will add my own diagnosis  -- Disagree - Not applicable / Not valid  -- Disagree - Clinically unable to determine / Unknown  -- Refer to Clinical Documentation Reviewer    PROVIDER RESPONSE TEXT:    Patient has UTI as a postoperative complication.     Query created by: John Castanon on 5/10/2021 1:53 PM      Electronically signed by:  Lenore GORE 5/10/2021 4:13 PM

## 2021-05-10 NOTE — PROGRESS NOTES
Progress Note    Admit Date:  5/8/2021    Admitted for sepsis     Subjective:  Mr. Rashmi Guo reports he is feeling better today. +Blood Cx with Serratia marcescens and urine Cx + as well. Continue IV Abx. Fevers improved     Objective:   Patient Vitals for the past 4 hrs:   BP Temp Temp src Pulse Resp   05/10/21 0730 118/70 97.8 °F (36.6 °C) Oral 86 16       Intake/Output Summary (Last 24 hours) at 5/10/2021 0932  Last data filed at 5/10/2021 0901  Gross per 24 hour   Intake 3225 ml   Output 3350 ml   Net -125 ml       Physical Exam:  Gen: No distress. Alert. Eyes:  No conjunctival injection. ENT: No discharge. Pharynx clear. Neck: Trachea midline. Resp: No accessory muscle use. No crackles. No wheezes. No rhonchi. CV: Regular rate. Regular rhythm. No murmur. No rub. No edema. Capillary Refill: Brisk,< 3 seconds   Peripheral Pulses: +2 palpable, equal bilaterally   GI: Non-tender. Non-distended Normal bowel sounds. Skin: Warm and dry. Superficial abrasion to BLEs  M/S: No cyanosis. No joint deformity. No clubbing. Neuro: Awake. Grossly nonfocal, generalized weakness  Psych: Oriented x 3. No anxiety or agitation.      Scheduled Meds:   cefepime  2,000 mg Intravenous Q12H    allopurinol  100 mg Oral Daily    aspirin  81 mg Oral Daily    atorvastatin  20 mg Oral Daily    insulin glargine  20 Units Subcutaneous BID    [Held by provider] metoprolol succinate  25 mg Oral Daily    tamsulosin  0.4 mg Oral Nightly    sodium chloride flush  5-40 mL Intravenous 2 times per day    enoxaparin  40 mg Subcutaneous Daily    insulin lispro  0-6 Units Subcutaneous TID     insulin lispro  0-3 Units Subcutaneous Nightly    vancomycin  1,000 mg Intravenous Q18H    pregabalin  300 mg Oral BID       Continuous Infusions:   sodium chloride      dextrose      sodium chloride 100 mL/hr at 05/10/21 0146       PRN Meds:  traMADol, sodium chloride flush, sodium chloride, promethazine **OR** ondansetron, polyethylene glycol, acetaminophen **OR** acetaminophen, glucose, dextrose, glucagon (rDNA), dextrose      Data:  CBC:   Recent Labs     05/08/21  2320 05/10/21  0421   WBC 2.6* 3.2*   HGB 13.6 11.7*   HCT 39.9* 34.3*   MCV 95.4 95.7   * 100*     BMP:   Recent Labs     05/08/21  2320 05/10/21  0421   * 139   K 4.2 3.9   CL 99 107   CO2 23 20*   BUN 29* 23*   CREATININE 1.4* 1.4*     LIVER PROFILE:   Recent Labs     05/08/21 2320   AST 16   ALT 11   BILITOT 0.7   ALKPHOS 176*     CULTURES  COVID-19:not detected   Blood Cx #1: NGTD  Blood Cx#2: Serratia marcescens   Urine Cx: pending      RADIOLOGY  CT ABDOMEN PELVIS WO CONTRAST Additional Contrast? None   Final Result   Patchy ground-glass opacities at the lung bases for which multifocal   pneumonia should be considered, likely an atypical or viral pneumonia. Cholelithiasis. The gallbladder is moderately dilated with a few tiny   calcified gallstones. No findings to suggest acute cholecystitis. Mild gas and stool load. XR CHEST PORTABLE   Final Result   No acute cardiopulmonary process.              Assessment/Plan:  Sepsis (POA- leukopenia, LA, febrile, hypotension, source)  - suspect source is UTI  - PCT elevated   - BP low, no need for pressors at this time   - Continue IVF, LA resolved   - Blood Cx: + Serratia marcescens in 1 out of 2 bottles   - Urine Cx pending   - Continue Vanc/Cefepime D#2     Abnormal Chest CT  - CT with patchy ground glass opacities at the lung bases-> multifocal PNA vs viral PNA-->does have a hx of COVID in Nov 2020   - COVID PCR is negative   - No PNA symptoms and lungs clear on exam  - Likely residual changes from prior COVID infection?  -Continue vancomycin and cefepime for now.     Bacteremia  - + Blood Cx: Serratia   - Sensitivities pending   - Continue IV Abx    Leukopenia  - in the setting of acute sepsis  - WBC: 2.6 > 3.2   - Monitor   -  Blood Cx +   - Continue Abx and trend      UTI  - Recent cystoscopy on Friday at the [place]   - UA was negative for Nitrite or LE but does show WBC and Bacteria  - Urine Cx: + Serratia   - Continue IV Abx as above      TCP  - baseline PLT ~140  - PLT on admission 103 > 100  - No bleeding, okay to have PPx Lovenox      CKD Stage III  - Baseline creatinine ~1.5  - Creatinine on admission  1.4-> 1.4   - stable      DM2  - Hold home oral Rx   - Continue SSI and Lantus   - Hgb A1c: pending       HLD  - Continue statin      Gout  - no acute flare   - Continue home allopurinol      Chronic Back pain   Neuropathy   - Continue Lyrica     DVT Prophylaxis: Lovenox   Diet: DIET CARB CONTROL;  Code Status: Full Code    Await sensitivities of blood Cx and urine Cx results. Continue IV Abx. PT/OT.  Continue IVF    Yisel Elizabeth 9:32 AM 5/10/2021

## 2021-05-10 NOTE — FLOWSHEET NOTE
05/10/21 0842   Pain Assessment   Pain Assessment 0-10   Pain Level 7   Pain Type Chronic pain   Pain Location Back   Pain Orientation Lower   Pain Descriptors Aching   Pain Frequency Continuous   Pain Onset On-going   Clinical Progression Not changed   Functional Pain Assessment Prevents or interferes some active activities and ADLs     Pain unchanged after routine Lyrica and PRN Tylenol. PRN ultram administered per order.

## 2021-05-11 VITALS
HEART RATE: 69 BPM | DIASTOLIC BLOOD PRESSURE: 59 MMHG | RESPIRATION RATE: 16 BRPM | TEMPERATURE: 97.5 F | BODY MASS INDEX: 26.93 KG/M2 | SYSTOLIC BLOOD PRESSURE: 133 MMHG | WEIGHT: 171.6 LBS | OXYGEN SATURATION: 94 % | HEIGHT: 67 IN

## 2021-05-11 LAB
ANION GAP SERPL CALCULATED.3IONS-SCNC: 13 MMOL/L (ref 3–16)
BASOPHILS ABSOLUTE: 0 K/UL (ref 0–0.2)
BASOPHILS ABSOLUTE: 0 K/UL (ref 0–0.2)
BASOPHILS RELATIVE PERCENT: 0.4 %
BASOPHILS RELATIVE PERCENT: 0.6 %
BUN BLDV-MCNC: 19 MG/DL (ref 7–20)
CALCIUM SERPL-MCNC: 8.9 MG/DL (ref 8.3–10.6)
CHLORIDE BLD-SCNC: 107 MMOL/L (ref 99–110)
CO2: 21 MMOL/L (ref 21–32)
CREAT SERPL-MCNC: 1.2 MG/DL (ref 0.8–1.3)
CULTURE, BLOOD 2: ABNORMAL
CULTURE, BLOOD 2: ABNORMAL
EOSINOPHILS ABSOLUTE: 0 K/UL (ref 0–0.6)
EOSINOPHILS ABSOLUTE: 0.1 K/UL (ref 0–0.6)
EOSINOPHILS RELATIVE PERCENT: 1.6 %
EOSINOPHILS RELATIVE PERCENT: 1.9 %
GFR AFRICAN AMERICAN: >60
GFR NON-AFRICAN AMERICAN: 59
GLUCOSE BLD-MCNC: 167 MG/DL (ref 70–99)
GLUCOSE BLD-MCNC: 69 MG/DL (ref 70–99)
GLUCOSE BLD-MCNC: 70 MG/DL (ref 70–99)
GLUCOSE BLD-MCNC: 89 MG/DL (ref 70–99)
HCT VFR BLD CALC: 35.1 % (ref 40.5–52.5)
HCT VFR BLD CALC: 36.4 % (ref 40.5–52.5)
HEMOGLOBIN: 12.1 G/DL (ref 13.5–17.5)
HEMOGLOBIN: 12.3 G/DL (ref 13.5–17.5)
LYMPHOCYTES ABSOLUTE: 0.6 K/UL (ref 1–5.1)
LYMPHOCYTES ABSOLUTE: 0.7 K/UL (ref 1–5.1)
LYMPHOCYTES RELATIVE PERCENT: 21.5 %
LYMPHOCYTES RELATIVE PERCENT: 22.3 %
MCH RBC QN AUTO: 32.7 PG (ref 26–34)
MCH RBC QN AUTO: 32.9 PG (ref 26–34)
MCHC RBC AUTO-ENTMCNC: 33.7 G/DL (ref 31–36)
MCHC RBC AUTO-ENTMCNC: 34.5 G/DL (ref 31–36)
MCV RBC AUTO: 95.4 FL (ref 80–100)
MCV RBC AUTO: 96.9 FL (ref 80–100)
MONOCYTES ABSOLUTE: 0.1 K/UL (ref 0–1.3)
MONOCYTES ABSOLUTE: 0.1 K/UL (ref 0–1.3)
MONOCYTES RELATIVE PERCENT: 2.8 %
MONOCYTES RELATIVE PERCENT: 3.4 %
NEUTROPHILS ABSOLUTE: 2.1 K/UL (ref 1.7–7.7)
NEUTROPHILS ABSOLUTE: 2.2 K/UL (ref 1.7–7.7)
NEUTROPHILS RELATIVE PERCENT: 72.4 %
NEUTROPHILS RELATIVE PERCENT: 73.1 %
ORGANISM: ABNORMAL
PDW BLD-RTO: 14.3 % (ref 12.4–15.4)
PDW BLD-RTO: 14.4 % (ref 12.4–15.4)
PERFORMED ON: ABNORMAL
PERFORMED ON: NORMAL
PERFORMED ON: NORMAL
PLATELET # BLD: 100 K/UL (ref 135–450)
PLATELET # BLD: 89 K/UL (ref 135–450)
PMV BLD AUTO: 7.3 FL (ref 5–10.5)
PMV BLD AUTO: 7.3 FL (ref 5–10.5)
POTASSIUM REFLEX MAGNESIUM: 3.8 MMOL/L (ref 3.5–5.1)
PROCALCITONIN: 3.82 NG/ML (ref 0–0.15)
RBC # BLD: 3.68 M/UL (ref 4.2–5.9)
RBC # BLD: 3.75 M/UL (ref 4.2–5.9)
SODIUM BLD-SCNC: 141 MMOL/L (ref 136–145)
URINE CULTURE, ROUTINE: ABNORMAL
VANCOMYCIN TROUGH: 12.4 UG/ML (ref 10–20)
WBC # BLD: 2.9 K/UL (ref 4–11)
WBC # BLD: 3 K/UL (ref 4–11)

## 2021-05-11 PROCEDURE — 6370000000 HC RX 637 (ALT 250 FOR IP): Performed by: HOSPITALIST

## 2021-05-11 PROCEDURE — 84145 PROCALCITONIN (PCT): CPT

## 2021-05-11 PROCEDURE — 6360000002 HC RX W HCPCS: Performed by: INTERNAL MEDICINE

## 2021-05-11 PROCEDURE — 2580000003 HC RX 258: Performed by: HOSPITALIST

## 2021-05-11 PROCEDURE — 80048 BASIC METABOLIC PNL TOTAL CA: CPT

## 2021-05-11 PROCEDURE — 36415 COLL VENOUS BLD VENIPUNCTURE: CPT

## 2021-05-11 PROCEDURE — 87040 BLOOD CULTURE FOR BACTERIA: CPT

## 2021-05-11 PROCEDURE — 99239 HOSP IP/OBS DSCHRG MGMT >30: CPT | Performed by: INTERNAL MEDICINE

## 2021-05-11 PROCEDURE — 85025 COMPLETE CBC W/AUTO DIFF WBC: CPT

## 2021-05-11 PROCEDURE — 80202 ASSAY OF VANCOMYCIN: CPT

## 2021-05-11 PROCEDURE — 2580000003 HC RX 258: Performed by: PHYSICIAN ASSISTANT

## 2021-05-11 PROCEDURE — 6360000002 HC RX W HCPCS: Performed by: HOSPITALIST

## 2021-05-11 PROCEDURE — 6370000000 HC RX 637 (ALT 250 FOR IP): Performed by: INTERNAL MEDICINE

## 2021-05-11 RX ORDER — CIPROFLOXACIN 2 MG/ML
400 INJECTION, SOLUTION INTRAVENOUS ONCE
Status: COMPLETED | OUTPATIENT
Start: 2021-05-11 | End: 2021-05-11

## 2021-05-11 RX ORDER — CIPROFLOXACIN 500 MG/1
500 TABLET, FILM COATED ORAL 2 TIMES DAILY
Qty: 20 TABLET | Refills: 0 | Status: SHIPPED | OUTPATIENT
Start: 2021-05-11 | End: 2021-05-21

## 2021-05-11 RX ADMIN — ENOXAPARIN SODIUM 40 MG: 40 INJECTION SUBCUTANEOUS at 08:53

## 2021-05-11 RX ADMIN — ASPIRIN 81 MG: 81 TABLET, CHEWABLE ORAL at 08:53

## 2021-05-11 RX ADMIN — SODIUM CHLORIDE: 9 INJECTION, SOLUTION INTRAVENOUS at 06:00

## 2021-05-11 RX ADMIN — PREGABALIN 300 MG: 100 CAPSULE ORAL at 08:53

## 2021-05-11 RX ADMIN — ALLOPURINOL 100 MG: 100 TABLET ORAL at 08:53

## 2021-05-11 RX ADMIN — INSULIN GLARGINE 20 UNITS: 100 INJECTION, SOLUTION SUBCUTANEOUS at 08:43

## 2021-05-11 RX ADMIN — CIPROFLOXACIN 400 MG: 2 INJECTION, SOLUTION INTRAVENOUS at 08:54

## 2021-05-11 RX ADMIN — Medication 10 ML: at 08:55

## 2021-05-11 RX ADMIN — ATORVASTATIN CALCIUM 20 MG: 10 TABLET, FILM COATED ORAL at 08:53

## 2021-05-11 NOTE — PLAN OF CARE
Problem: Infection:  Goal: Will remain free from infection  Description: Will remain free from infection  5/10/2021 0831 by Nellie Evans RN  Outcome: Ongoing     Problem: Safety:  Goal: Free from accidental physical injury  Description: Free from accidental physical injury  5/10/2021 0831 by Nellie Evans RN  Outcome: Ongoing     Problem: Daily Care:  Goal: Daily care needs are met  Description: Daily care needs are met  5/10/2021 0831 by Nellie Evans RN  Outcome: Ongoing     Problem: Pain:  Goal: Patient's pain/discomfort is manageable  Description: Patient's pain/discomfort is manageable  5/10/2021 0831 by Nellie Evans RN  Outcome: Ongoing    Problem: Falls - Risk of:  Goal: Will remain free from falls  Description: Will remain free from falls  5/10/2021 0831 by Nellie Evans RN  Outcome: Ongoing  5/9/2021 2347 by Shai Brasher RN  Outcome: Ongoing
Problem: Infection:  Goal: Will remain free from infection  Description: Will remain free from infection  5/11/2021 0821 by India Dowd RN  Outcome: Completed     Problem: Safety:  Goal: Free from accidental physical injury  Description: Free from accidental physical injury  5/11/2021 0821 by India Dowd RN  Outcome: Completed     Problem: Safety:  Goal: Free from intentional harm  Description: Free from intentional harm  5/11/2021 0821 by India Dowd RN  Outcome: Completed     Problem: Daily Care:  Goal: Daily care needs are met  Description: Daily care needs are met  5/11/2021 0821 by India Dowd RN  Outcome: Completed     Problem: Pain:  Goal: Patient's pain/discomfort is manageable  Description: Patient's pain/discomfort is manageable  5/11/2021 0821 by India Dowd RN  Outcome: Completed     Problem: Pain:  Goal: Pain level will decrease  Description: Pain level will decrease  5/11/2021 0821 by India Dowd RN  Outcome: Completed     Problem: Pain:  Goal: Control of acute pain  Description: Control of acute pain  5/11/2021 0821 by Inida Dowd RN  Outcome: Completed     Problem: Pain:  Goal: Control of chronic pain  Description: Control of chronic pain  5/11/2021 0821 by India oDwd RN  Outcome: Completed     Problem: Skin Integrity:  Goal: Skin integrity will stabilize  Description: Skin integrity will stabilize  5/11/2021 0821 by India Dowd RN  Outcome: Completed     Problem: Discharge Planning:  Goal: Patients continuum of care needs are met  Description: Patients continuum of care needs are met  5/11/2021 0821 by India oDwd RN  Outcome: Completed     Problem: Falls - Risk of:  Goal: Will remain free from falls  Description: Will remain free from falls  5/11/2021 0821 by India Dowd RN  Outcome: Completed     Problem: Falls - Risk of:  Goal: Absence of physical injury  Description: Absence of physical injury  5/11/2021 0821 by India Dowd RN  Outcome: Completed
Problem: Infection:  Goal: Will remain free from infection  Description: Will remain free from infection  Outcome: Ongoing     Problem: Safety:  Goal: Free from accidental physical injury  Description: Free from accidental physical injury  Outcome: Ongoing  Goal: Free from intentional harm  Description: Free from intentional harm  Outcome: Ongoing     Problem: Daily Care:  Goal: Daily care needs are met  Description: Daily care needs are met  Outcome: Ongoing     Problem: Pain:  Goal: Patient's pain/discomfort is manageable  Description: Patient's pain/discomfort is manageable  Outcome: Ongoing     Problem: Skin Integrity:  Goal: Skin integrity will stabilize  Description: Skin integrity will stabilize  Outcome: Ongoing     Problem: Discharge Planning:  Goal: Patients continuum of care needs are met  Description: Patients continuum of care needs are met  Outcome: Ongoing     Problem: Falls - Risk of:  Goal: Will remain free from falls  Description: Will remain free from falls  Outcome: Ongoing  Goal: Absence of physical injury  Description: Absence of physical injury  Outcome: Ongoing     Problem: Falls - Risk of:  Goal: Absence of physical injury  Description: Absence of physical injury  Outcome: Ongoing     Problem: Falls - Risk of:  Goal: Absence of physical injury  Description: Absence of physical injury  Outcome: Ongoing
Recent outpatient cystoscopy 2 days ago, presenting with sepsis, uti
What Is The Reason For Today's Visit?: History of Non-Melanoma Skin Cancer
How Many Skin Cancers Have You Had?: one
Ongoing
When Was Your Last Cancer Diagnosed?: 2018

## 2021-05-11 NOTE — DISCHARGE SUMMARY
Name:  Jessica Sherman  Room:  /1138-75  MRN:    [id number]    Discharge Summary      This discharge summary is in conjunction with a complete physical exam done on the day of discharge. Discharging Physician: Dr. Reyez Brittany: 5/8/2021  Discharge:  5/11/2021    HPI taken from admission H&P:    The patient is a 65 y.o. male with chronic pain, hx of COVID-19 infection, Gout, HTN, HLD, DM2 who presented to [facility] ED with complaint of weakness and fevers. Patient reports that he had a cystoscopy at the [place] on Friday as an outpatient procedure. Has been having difficulty peeing and reports they were looking for any obstruction. He went home on Friday feeling well, but after mowing his yard yesterday, he went in and sat down to talk with his family and started feeling very cold and shaking. He went in to his bed and his wife covered him with a lot of blankets and he said about 15 mins later he wasn't shaking anymore. He went to try to get up but was too weak and even with his 2 sons help, he still couldn't get up. He reports some dysuria but no abdominal pain, CP cough, SOB, N/V. Diagnoses this Admission and Hospital Course     Sepsis (POA- leukopenia, LA, febrile, hypotension, source)  - 2/2 Serratia UTI (recent cysto last week at [place]) with Serratia bacteremia   - BP low, but did not require pressors. LA elevation resolved with IV NS  - Initially placed on Vanc/Cefepime D#2- DC to home on Cipro to complete 10 more days      Abnormal Chest CT  - CT with patchy ground glass opacities at the lung bases-> multifocal PNA vs viral PNA-->does have a hx of COVID in Nov 2020   - COVID PCR is negative   - No PNA symptoms and lungs clear on exam  - Could residual changes from prior COVID infection?     Leukopenia  - in the setting of acute sepsis, repeat CBC as OP after acute infection resolves is recommended      TCP  - baseline PLT ~140  - PLT on admission 103 > 100  - low plts could be related to sepsis. Repeat CBC after acute infection resolves      CKD Stage III  - Baseline creatinine ~1.5  - stable      DM2  - can resume home regimen     HLD  - Continue statin      Gout  - no acute flare   - Continue home allopurinol      Chronic Back pain   Neuropathy   - Continue Lyrica     Procedures (Please Review Full Report for Details)  None     Consults    None     Physical Exam at Discharge:    BP (!) 133/59   Pulse 69   Temp 97.5 °F (36.4 °C) (Oral)   Resp 16   Ht 5' 7\" (1.702 m)   Wt 171 lb 9.6 oz (77.8 kg)   SpO2 94%   BMI 26.88 kg/m²     Gen: No distress. Alert. Eyes:  No conjunctival injection. ENT: No discharge. Pharynx clear. Neck: Trachea midline. Resp: No accessory muscle use. No crackles. No wheezes. No rhonchi. CV: Regular rate. Regular rhythm. No murmur.  No rub. No edema. GI: Non-tender. Non-distended Normal bowel sounds. Skin: Warm and dry. Superficial abrasion to BLEs  M/S: No cyanosis. No joint deformity. No clubbing. Neuro: Awake. Grossly nonfocal, generalized weakness  Psych: Oriented x 3. No anxiety or agitation.      CBC:   Recent Labs     05/10/21  0421 05/11/21  0731 05/11/21  0822   WBC 3.2* 3.0* 2.9*   HGB 11.7* 12.3* 12.1*   HCT 34.3* 36.4* 35.1*   MCV 95.7 96.9 95.4   * 89* 100*     BMP:   Recent Labs     05/08/21 2320 05/10/21  0421 05/11/21  0731   * 139 141   K 4.2 3.9 3.8   CL 99 107 107   CO2 23 20* 21   BUN 29* 23* 19   CREATININE 1.4* 1.4* 1.2     LIVER PROFILE:   Recent Labs     05/08/21 2320   AST 16   ALT 11   BILITOT 0.7   ALKPHOS 176*     UA:  Recent Labs     05/08/21  2346   COLORU Yellow   PHUR 7.5   WBCUA 21-50*   RBCUA 21-50*   MUCUS 1+*   BACTERIA 2+*   CLARITYU Clear   SPECGRAV 1.015   LEUKOCYTESUR Negative   UROBILINOGEN 1.0   BILIRUBINUR Negative   BLOODU MODERATE*   GLUCOSEU >=1000*     CULTURES  Urine:   Serratia marcescens (1)    Antibiotic Interpretation ASHER Status    ceFAZolin Resistant >=64 mcg/mL     cefepime Sensitive <=0.12 mcg/mL cefTRIAXone Sensitive <=0.25 mcg/mL     ciprofloxacin Sensitive <=0.25 mcg/mL     ertapenem Sensitive <=0.12 mcg/mL     gentamicin Sensitive <=1 mcg/mL     levofloxacin Sensitive <=0.12 mcg/mL     nitrofurantoin Resistant 256 mcg/mL     trimethoprim-sulfamethoxazole Sensitive <=20 mcg/mL       Blood #1 5/8/21: NGTD    Blood #2 5/8/21   Serratia marcescens (2)    Antibiotic Interpretation ASHER Status    amoxicillin-clavulanate Resistant >16/8 mcg/mL     ampicillin Resistant 16 mcg/mL     ceFAZolin Resistant >16 mcg/mL     cefepime Sensitive <=2 mcg/mL     cefTRIAXone Resistant 8 mcg/mL     cefuroxime Resistant >16 mcg/mL     ciprofloxacin Sensitive <=1 mcg/mL     ertapenem Sensitive <=0.5 mcg/mL     gentamicin Sensitive <=4 mcg/mL     meropenem Sensitive <=1 mcg/mL     piperacillin-tazobactam Sensitive <=16 mcg/mL     trimethoprim-sulfamethoxazole Sensitive <=2/38 mcg/mL         RADIOLOGY  CT ABDOMEN PELVIS WO CONTRAST Additional Contrast? None   Final Result   Patchy ground-glass opacities at the lung bases for which multifocal   pneumonia should be considered, likely an atypical or viral pneumonia. Cholelithiasis. The gallbladder is moderately dilated with a few tiny   calcified gallstones. No findings to suggest acute cholecystitis. Mild gas and stool load. XR CHEST PORTABLE   Final Result   No acute cardiopulmonary process. Discharge Medications     Medication List      START taking these medications    ciprofloxacin 500 MG tablet  Commonly known as: CIPRO  Take 1 tablet by mouth 2 times daily for 10 days        CHANGE how you take these medications    * INSULIN GLARGINE (1 UNIT DIAL) SC  What changed: Another medication with the same name was changed. Make sure you understand how and when to take each. * insulin glargine 100 UNIT/ML injection vial  Commonly known as: Lantus  Inject 10 Units into the skin nightly.    What changed:   · how much to take  · when to take this metoprolol succinate 25 MG extended release tablet  Commonly known as: Toprol XL  Take 1 tablet by mouth daily  What changed: Another medication with the same name was removed. Continue taking this medication, and follow the directions you see here. * This list has 2 medication(s) that are the same as other medications prescribed for you. Read the directions carefully, and ask your doctor or other care provider to review them with you. CONTINUE taking these medications    allopurinol 100 MG tablet  Commonly known as: ZYLOPRIM     aspirin 81 MG chewable tablet  Take 1 tablet by mouth daily     atorvastatin 10 MG tablet  Commonly known as: LIPITOR     docusate sodium 100 MG capsule  Commonly known as: Colace  Take 1 capsule by mouth daily     empagliflozin 25 MG tablet  Commonly known as: JARDIANCE     glipiZIDE 5 MG tablet  Commonly known as: GLUCOTROL     metFORMIN 500 MG tablet  Commonly known as: GLUCOPHAGE     pregabalin 100 MG capsule  Commonly known as: LYRICA     tamsulosin 0.4 MG capsule  Commonly known as: FLOMAX     traMADol 50 MG tablet  Commonly known as: ULTRAM     vitamin B-12 1000 MCG tablet  Commonly known as: CYANOCOBALAMIN     VITAMIN D PO        STOP taking these medications    metoprolol tartrate 25 MG tablet  Commonly known as: LOPRESSOR           Where to Get Your Medications      These medications were sent to [id number] [address], [address] [place] [address] Drive [address]    Phone: [phone]   · ciprofloxacin 500 MG tablet           Discharged in stable condition to home     Follow Up:   Follow up with PCP in 1 week       More than 30 mts spent      [facility] 5/13/2021 2:55 PM

## 2021-05-11 NOTE — FLOWSHEET NOTE
05/10/21 1911   Vital Signs   Temp 99.5 °F (37.5 °C)   Temp Source Oral   Pulse 90   Heart Rate Source Monitor   Resp 16   BP (!) 160/77   BP Location Left lower arm   Patient Position Semi fowlers   Level of Consciousness Alert (0)   MEWS Score 1   Oxygen Therapy   SpO2 90 %   O2 Device None (Room air)   Pt. Resting in bed. Call light in reach. Shift assessment completed see flow sheet. Denies any needs at this time. Will continue to monitor.

## 2021-05-11 NOTE — CARE COORDINATION
DISCHARGE ORDER  Date/Time 2021 9:51 AM  Completed by: Michela Momin, Case Management    Patient Name: Yue Recinos      : 1950  Admitting Diagnosis: Sepsis ([address] [A41.9]      Admit order Date and Status:2021 inpt  (verify MD's last order for status of admission)      Noted discharge order. If applicable PT/OT recommendation at Discharge: home with prn assist with home PT/OT  DME recommendation by PT/OT:n/a  Confirmed discharge plan  (pt): Yes  with whom_______________pt  If pt confirmed DC plan does family need to be contacted by CM No if yes who_____n/a_  Discharge Plan: Reviewed chart. Role of discharge planner explained and patient verbalized understanding. Discharge order is noted. Pt is being d/c'd to home today. Pt's O2 sats are 94% on RA. Pt is aware that PT/OT recommend home with PRN assist with home PT/OT. Pt states that he lives with his family and his family is with his  and can provide PRN assist. CM offered HHC and Pt declines HHC. No further discharge needs needed or noted. Reviewed chart. Role of discharge planner explained and patient verbalized understanding. Discharge order is noted. Has Home O2 in place on admit:  No  Informed of need to bring portable home O2 tank on day of discharge for nursing to connect prior to leaving:   Not Indicated  Verbalized agreement/Understanding:   Not Indicated    Discharge timeout done with Sunny Ramos RN. All discharge needs and concerns addressed.

## 2021-05-11 NOTE — PROGRESS NOTES
Discharge instructions given to pt and family,verbalize understanding. Pt discharged home stable condition.

## 2021-05-13 LAB — BLOOD CULTURE, ROUTINE: NORMAL

## 2021-05-14 VITALS
BODY MASS INDEX: 27.94 KG/M2 | SYSTOLIC BLOOD PRESSURE: 147 MMHG | WEIGHT: 178 LBS | TEMPERATURE: 97.8 F | DIASTOLIC BLOOD PRESSURE: 81 MMHG | OXYGEN SATURATION: 95 % | RESPIRATION RATE: 20 BRPM | HEART RATE: 96 BPM | HEIGHT: 67 IN

## 2021-05-14 PROCEDURE — 99283 EMERGENCY DEPT VISIT LOW MDM: CPT

## 2021-05-15 ENCOUNTER — HOSPITAL ENCOUNTER (EMERGENCY)
Age: 71
Discharge: HOME | End: 2021-05-15
Attending: EMERGENCY MEDICINE
Payer: MEDICARE

## 2021-05-15 DIAGNOSIS — R53.1 GENERALIZED WEAKNESS: Primary | ICD-10-CM

## 2021-05-15 LAB
A/G RATIO: 1.3 (ref 1.1–2.2)
ALBUMIN SERPL-MCNC: 3.9 G/DL (ref 3.4–5)
ALP BLD-CCNC: 136 U/L (ref 40–129)
ALT SERPL-CCNC: 12 U/L (ref 10–40)
ANION GAP SERPL CALCULATED.3IONS-SCNC: 12 MMOL/L (ref 3–16)
AST SERPL-CCNC: 19 U/L (ref 15–37)
BASOPHILS ABSOLUTE: 0 K/UL (ref 0–0.2)
BASOPHILS RELATIVE PERCENT: 0.6 %
BILIRUB SERPL-MCNC: 0.4 MG/DL (ref 0–1)
BILIRUBIN URINE: NEGATIVE
BLOOD CULTURE, ROUTINE: NORMAL
BLOOD, URINE: NEGATIVE
BUN BLDV-MCNC: 29 MG/DL (ref 7–20)
CALCIUM SERPL-MCNC: 9.5 MG/DL (ref 8.3–10.6)
CHLORIDE BLD-SCNC: 99 MMOL/L (ref 99–110)
CLARITY: CLEAR
CO2: 23 MMOL/L (ref 21–32)
COLOR: ABNORMAL
CREAT SERPL-MCNC: 1.5 MG/DL (ref 0.8–1.3)
EOSINOPHILS ABSOLUTE: 0 K/UL (ref 0–0.6)
EOSINOPHILS RELATIVE PERCENT: 1.6 %
GFR AFRICAN AMERICAN: 56
GFR NON-AFRICAN AMERICAN: 46
GLOBULIN: 3.1 G/DL
GLUCOSE BLD-MCNC: 246 MG/DL (ref 70–99)
GLUCOSE URINE: >=1000 MG/DL
HCT VFR BLD CALC: 35.1 % (ref 40.5–52.5)
HEMOGLOBIN: 12 G/DL (ref 13.5–17.5)
KETONES, URINE: NEGATIVE MG/DL
LACTIC ACID: 0.7 MMOL/L (ref 0.4–2)
LEUKOCYTE ESTERASE, URINE: NEGATIVE
LYMPHOCYTES ABSOLUTE: 0.6 K/UL (ref 1–5.1)
LYMPHOCYTES RELATIVE PERCENT: 22.4 %
MCH RBC QN AUTO: 32.3 PG (ref 26–34)
MCHC RBC AUTO-ENTMCNC: 34.1 G/DL (ref 31–36)
MCV RBC AUTO: 94.6 FL (ref 80–100)
MICROSCOPIC EXAMINATION: YES
MONOCYTES ABSOLUTE: 0.1 K/UL (ref 0–1.3)
MONOCYTES RELATIVE PERCENT: 4.5 %
NEUTROPHILS ABSOLUTE: 1.8 K/UL (ref 1.7–7.7)
NEUTROPHILS RELATIVE PERCENT: 70.9 %
NITRITE, URINE: NEGATIVE
PDW BLD-RTO: 14 % (ref 12.4–15.4)
PH UA: 7.5 (ref 5–8)
PLATELET # BLD: 129 K/UL (ref 135–450)
PMV BLD AUTO: 7.2 FL (ref 5–10.5)
POTASSIUM REFLEX MAGNESIUM: 4.2 MMOL/L (ref 3.5–5.1)
PROTEIN UA: ABNORMAL MG/DL
RBC # BLD: 3.71 M/UL (ref 4.2–5.9)
RBC UA: NORMAL /HPF (ref 0–4)
SODIUM BLD-SCNC: 134 MMOL/L (ref 136–145)
SPECIFIC GRAVITY UA: 1.01 (ref 1–1.03)
TOTAL PROTEIN: 7 G/DL (ref 6.4–8.2)
URINE REFLEX TO CULTURE: ABNORMAL
URINE TYPE: ABNORMAL
UROBILINOGEN, URINE: 1 E.U./DL
WBC # BLD: 2.5 K/UL (ref 4–11)
WBC UA: NORMAL /HPF (ref 0–5)

## 2021-05-15 PROCEDURE — 80053 COMPREHEN METABOLIC PANEL: CPT

## 2021-05-15 PROCEDURE — 83605 ASSAY OF LACTIC ACID: CPT

## 2021-05-15 PROCEDURE — 81001 URINALYSIS AUTO W/SCOPE: CPT

## 2021-05-15 PROCEDURE — 85025 COMPLETE CBC W/AUTO DIFF WBC: CPT

## 2021-05-15 NOTE — ED PROVIDER NOTES
[facility]  ED    CHIEF COMPLAINT  Fatigue (Junior comes into ED with c/o feeling weak x1 week. Patient was recently seen at [place] for same issues. )       HISTORY OF PRESENT ILLNESS  Pankaj López is a 65 y.o. male with past medical history including COVID-19 infection, gout, hypertension, hyperlipidemia, DM 2 who presents to the ED with complaint of fatigue x1 week. Patient was discharged from [facility] on 5/11/2021 after admission for sepsis favored secondary to Serratia UTI. He was treated with vancomycin and cefepime and ultimately transition to ciprofloxacin at time of hospital discharge. Since discharge she has had persistent fatigue. Denies fever, chest pain, shortness of breath, nausea, vomiting, diarrhea, abdominal pain, dysuria, cough, congestion. No other complaints, modifying factors or associated symptoms. I have reviewed the following from the nursing documentation:    Past Medical History:   Diagnosis Date    Carotid artery occlusion 3/10    60-69% left (neg stress test 5/10)    Cerebral artery occlusion with cerebral infarction [facility])     Cerebrovascular disease 3/10    ? TIA    Chronic back pain      Dr Chastity Shell - pain management    COVID-19 11/20/2020    Gout     Hyperlipidemia     Hypertension     Routine health maintenance     Type II or unspecified type diabetes mellitus without mention of complication, not stated as uncontrolled     eye exam 5/30     Past Surgical History:   Procedure Laterality Date    CATARACT REMOVAL WITH IMPLANT Right     CYSTOSCOPY      NV REOPER, CAROTID ENDARTEC>1 MON Left 8/27/2018    LEFT CAROTID ENDARTERECTOMY performed by Pablo Hernandez MD at [facility]      neg prostate biopsy 1-09    SHOULDER SURGERY      JOINT CLEANING    TURP  2017     History reviewed. No pertinent family history.   Social History     Socioeconomic History    Marital status:      Spouse name: Brad Ibrahim Number of children: 4  Years of education: Not on file    Highest education level: Not on file   Occupational History    Not on file   Tobacco Use    Smoking status: Never Smoker    Smokeless tobacco: Never Used   Vaping Use    Vaping Use: Never used   Substance and Sexual Activity    Alcohol use: No    Drug use: No    Sexual activity: Yes     Partners: Female   Other Topics Concern    Not on file   Social History Narrative    Not on file     Social Determinants of Health     Financial Resource Strain:     Difficulty of Paying Living Expenses:    Food Insecurity:     Worried About Running Out of Food in the Last Year:     [address] in the Last Year:    Transportation Needs:     Lack of Transportation (Medical):  Lack of Transportation (Non-Medical):    Physical Activity:     Days of Exercise per Week:     Minutes of Exercise per Session:    Stress:     Feeling of Stress :    Social Connections:     Frequency of Communication with Friends and Family:     Frequency of Social Gatherings with Friends and Family:     Attends Mosque Services:     Active Member of Clubs or Organizations:     Attends Club or Organization Meetings:     Marital Status:    Intimate Partner Violence:     Fear of Current or Ex-Partner:     Emotionally Abused:     Physically Abused:     Sexually Abused:      No current facility-administered medications for this encounter.      Current Outpatient Medications   Medication Sig Dispense Refill    ciprofloxacin (CIPRO) 500 MG tablet Take 1 tablet by mouth 2 times daily for 10 days 20 tablet 0    empagliflozin (JARDIANCE) 25 MG tablet Take 25 mg by mouth daily      glipiZIDE (GLUCOTROL) 5 MG tablet Take 5 mg by mouth 2 times daily (before meals)      INSULIN GLARGINE, 1 UNIT DIAL, SC Inject into the skin      metFORMIN (GLUCOPHAGE) 500 MG tablet Take 500 mg by mouth 2 times daily (with meals)      allopurinol (ZYLOPRIM) 100 MG tablet Take 100 mg by mouth daily      VITAMIN D limitations of this technology and occasionally words are not transcribed correctly.         Obdulio Mckenzie MD  05/15/21 2128

## 2021-05-15 NOTE — ED NOTES
Patient identified as a positive fall risk on the ED triage fall screening. Patient placed in fall precautions which includes:  yellow fall risk bracelet on wrist, yellow socks on feet, \"Be Safe\" sign placed on patient's door, and bed alarm placed under patient/alarm turned on. Patient instructed on importance of not getting out of bed or ambulating without assistance for safety. Modified for david.       Brit Umana RN  05/14/21 1123

## 2023-12-18 ENCOUNTER — HOSPITAL ENCOUNTER (INPATIENT)
Age: 73
LOS: 2 days | Discharge: HOME | DRG: 884 | End: 2023-12-20
Attending: EMERGENCY MEDICINE | Admitting: INTERNAL MEDICINE
Payer: OTHER GOVERNMENT

## 2023-12-18 ENCOUNTER — APPOINTMENT (OUTPATIENT)
Dept: CT IMAGING | Age: 73
DRG: 884 | End: 2023-12-18
Payer: OTHER GOVERNMENT

## 2023-12-18 ENCOUNTER — APPOINTMENT (OUTPATIENT)
Dept: GENERAL RADIOLOGY | Age: 73
DRG: 884 | End: 2023-12-18
Payer: OTHER GOVERNMENT

## 2023-12-18 DIAGNOSIS — H53.9 VISUAL CHANGES: ICD-10-CM

## 2023-12-18 DIAGNOSIS — I16.0 HYPERTENSIVE URGENCY: ICD-10-CM

## 2023-12-18 DIAGNOSIS — N18.9 CHRONIC KIDNEY DISEASE, UNSPECIFIED CKD STAGE: ICD-10-CM

## 2023-12-18 DIAGNOSIS — R42 DIZZINESS: Primary | ICD-10-CM

## 2023-12-18 LAB
ALBUMIN SERPL-MCNC: 4.7 G/DL (ref 3.4–5)
ALBUMIN/GLOB SERPL: 1.7 {RATIO} (ref 1.1–2.2)
ALP SERPL-CCNC: 171 U/L (ref 40–129)
ALT SERPL-CCNC: 14 U/L (ref 10–40)
ANION GAP SERPL CALCULATED.3IONS-SCNC: 11 MMOL/L (ref 3–16)
APTT BLD: 25.8 SEC (ref 22.7–35.9)
AST SERPL-CCNC: 17 U/L (ref 15–37)
BASOPHILS # BLD: 0 K/UL (ref 0–0.2)
BASOPHILS NFR BLD: 0.5 %
BILIRUB SERPL-MCNC: 0.6 MG/DL (ref 0–1)
BILIRUB UR QL STRIP.AUTO: NEGATIVE
BUN SERPL-MCNC: 24 MG/DL (ref 7–20)
CALCIUM SERPL-MCNC: 9.9 MG/DL (ref 8.3–10.6)
CHLORIDE SERPL-SCNC: 101 MMOL/L (ref 99–110)
CLARITY UR: CLEAR
CO2 SERPL-SCNC: 26 MMOL/L (ref 21–32)
COLOR UR: YELLOW
CREAT SERPL-MCNC: 1.4 MG/DL (ref 0.8–1.3)
DEPRECATED RDW RBC AUTO: 13.4 % (ref 12.4–15.4)
EOSINOPHIL # BLD: 0.1 K/UL (ref 0–0.6)
EOSINOPHIL NFR BLD: 2.4 %
EPI CELLS #/AREA URNS HPF: NORMAL /HPF (ref 0–5)
GFR SERPLBLD CREATININE-BSD FMLA CKD-EPI: 52 ML/MIN/{1.73_M2}
GLUCOSE BLD-MCNC: 91 MG/DL (ref 70–99)
GLUCOSE SERPL-MCNC: 223 MG/DL (ref 70–99)
GLUCOSE UR STRIP.AUTO-MCNC: >=1000 MG/DL
HCT VFR BLD AUTO: 39.9 % (ref 40.5–52.5)
HGB BLD-MCNC: 13.5 G/DL (ref 13.5–17.5)
HGB UR QL STRIP.AUTO: NEGATIVE
INR PPP: 0.88 (ref 0.84–1.16)
KETONES UR STRIP.AUTO-MCNC: ABNORMAL MG/DL
LEUKOCYTE ESTERASE UR QL STRIP.AUTO: NEGATIVE
LYMPHOCYTES # BLD: 0.8 K/UL (ref 1–5.1)
LYMPHOCYTES NFR BLD: 34.6 %
MCH RBC QN AUTO: 32.7 PG (ref 26–34)
MCHC RBC AUTO-ENTMCNC: 33.9 G/DL (ref 31–36)
MCV RBC AUTO: 96.5 FL (ref 80–100)
MONOCYTES # BLD: 0.1 K/UL (ref 0–1.3)
MONOCYTES NFR BLD: 2.2 %
NEUTROPHILS # BLD: 1.4 K/UL (ref 1.7–7.7)
NEUTROPHILS NFR BLD: 60.3 %
NITRITE UR QL STRIP.AUTO: NEGATIVE
PERFORMED ON: NORMAL
PH UR STRIP.AUTO: 8 [PH] (ref 5–8)
PLATELET # BLD AUTO: 120 K/UL (ref 135–450)
PMV BLD AUTO: 7.3 FL (ref 5–10.5)
POTASSIUM SERPL-SCNC: 5 MMOL/L (ref 3.5–5.1)
PROT SERPL-MCNC: 7.5 G/DL (ref 6.4–8.2)
PROT UR STRIP.AUTO-MCNC: 100 MG/DL
PROTHROMBIN TIME: 11.9 SEC (ref 11.5–14.8)
RBC # BLD AUTO: 4.14 M/UL (ref 4.2–5.9)
RBC #/AREA URNS HPF: NORMAL /HPF (ref 0–4)
SODIUM SERPL-SCNC: 138 MMOL/L (ref 136–145)
SP GR UR STRIP.AUTO: 1.01 (ref 1–1.03)
TROPONIN, HIGH SENSITIVITY: 17 NG/L (ref 0–22)
UA COMPLETE W REFLEX CULTURE PNL UR: ABNORMAL
UA DIPSTICK W REFLEX MICRO PNL UR: YES
URN SPEC COLLECT METH UR: ABNORMAL
UROBILINOGEN UR STRIP-ACNC: 0.2 E.U./DL
WBC # BLD AUTO: 2.4 K/UL (ref 4–11)
WBC #/AREA URNS HPF: NORMAL /HPF (ref 0–5)

## 2023-12-18 PROCEDURE — 80053 COMPREHEN METABOLIC PANEL: CPT

## 2023-12-18 PROCEDURE — 81001 URINALYSIS AUTO W/SCOPE: CPT

## 2023-12-18 PROCEDURE — 71045 X-RAY EXAM CHEST 1 VIEW: CPT

## 2023-12-18 PROCEDURE — 70498 CT ANGIOGRAPHY NECK: CPT

## 2023-12-18 PROCEDURE — 85025 COMPLETE CBC W/AUTO DIFF WBC: CPT

## 2023-12-18 PROCEDURE — 85610 PROTHROMBIN TIME: CPT

## 2023-12-18 PROCEDURE — 70450 CT HEAD/BRAIN W/O DYE: CPT

## 2023-12-18 PROCEDURE — 85730 THROMBOPLASTIN TIME PARTIAL: CPT

## 2023-12-18 PROCEDURE — 99285 EMERGENCY DEPT VISIT HI MDM: CPT

## 2023-12-18 PROCEDURE — 36415 COLL VENOUS BLD VENIPUNCTURE: CPT

## 2023-12-18 PROCEDURE — 2060000000 HC ICU INTERMEDIATE R&B

## 2023-12-18 PROCEDURE — 84484 ASSAY OF TROPONIN QUANT: CPT

## 2023-12-18 PROCEDURE — 93005 ELECTROCARDIOGRAM TRACING: CPT | Performed by: EMERGENCY MEDICINE

## 2023-12-18 PROCEDURE — 6360000004 HC RX CONTRAST MEDICATION: Performed by: EMERGENCY MEDICINE

## 2023-12-18 RX ORDER — CARVEDILOL 6.25 MG/1
6.25 TABLET ORAL 2 TIMES DAILY
COMMUNITY
Start: 2023-10-10

## 2023-12-18 RX ORDER — LIDOCAINE AND PRILOCAINE 25; 25 MG/G; MG/G
1 CREAM TOPICAL PRN
COMMUNITY

## 2023-12-18 RX ORDER — ACETAMINOPHEN 500 MG
1000 TABLET ORAL EVERY 6 HOURS PRN
COMMUNITY

## 2023-12-18 RX ADMIN — IOMEPROL INJECTION 75 ML: 714 INJECTION, SOLUTION INTRAVASCULAR at 17:17

## 2023-12-18 NOTE — ED NOTES
Reports at approximately 1000 today he developed a headache trouble focusing his eyes. Reports SBP >200 at home. C/o nausea. Hx of CVA with left side residual deficit.         Lilli Enrique RN  12/18/23 4267

## 2023-12-18 NOTE — ED NOTES
PTC contacted and updated on destination is now [facility] per patient request.     Brian Hooker RN  12/18/23 0696

## 2023-12-18 NOTE — ED PROVIDER NOTES
[address] [facility]        Pt Name: Alejandro Brooks  MRN: [id number]  [address] [facility] [place] 1950  Date of evaluation: 12/18/2023  Provider: Graciela Brice DO  PCP: No primary care provider on file. Note Started: 3:35 PM EST 12/18/23    CHIEF COMPLAINT       Chief Complaint   Patient presents with    Hypertension       HISTORY OF PRESENT ILLNESS: 1 or more Elements     History from : Patient and Family wife    Limitations to history : None    Isiac H Madelyn Liu is a 74 y.o. male with past medical history of carotid artery occlusion, stroke, CVA, chronic back pain, COVID-19, gout, hypertension, hyperlipidemia, CKD, and diabetes who presents to the emergency department accompanied by wife at bedside for chief complaint of elevated blood pressure readings with nausea and visual changes. Patient states that on Saturday he began having worsening visual changes while trying to watch football. Patient does have a history of hemorrhagic stroke and is not on any current anticoagulation. Patient is compliant with his current antihypertensive medication which includes Coreg. Patient states that today he began experiencing worsening visual changes around 10 AM with a headache. Patient does admit that he has left-sided residual weakness from prior stroke with residual numbness and tingling to his left side. Patient denies any increasing numbness and tingling. Patient denies any falls or traumatic injury. Patient states that over the last several months he has had increasing dizziness and difficulty with ambulation and his primary care doctor has started him on meclizine. Patient did have a systolic blood pressure reading over 200 today and 1 episode of nausea and vomiting but states that his nausea and vomiting have improved. Patient denies any chest pain, shortness of breath, fever, chills, abdominal pain, difficulty breathing, and lower extremity edema.     Nursing Notes were all reviewed 100 MG tablet Comments:   Reason for Stopping:         VITAMIN D PO Comments:   Reason for Stopping:         vitamin B-12 (CYANOCOBALAMIN) 1000 MCG tablet Comments:   Reason for Stopping:         metoprolol succinate (TOPROL XL) 25 MG extended release tablet Comments:   Reason for Stopping:         tamsulosin (FLOMAX) 0.4 MG capsule Comments:   Reason for Stopping:         traMADol (ULTRAM) 50 MG tablet Comments:   Reason for Stopping:         aspirin 81 MG chewable tablet Comments:   Reason for Stopping:         docusate sodium (COLACE) 100 MG capsule Comments:   Reason for Stopping:                      (Please note that portions of this note were completed with a voice recognition program.  Efforts were made to edit the dictations but occasionally words are mis-transcribed. )    Samuel López DO (electronically signed)            Samuel López DO  12/24/23 3958

## 2023-12-19 PROBLEM — R91.1 PULMONARY NODULE: Status: ACTIVE | Noted: 2023-12-19

## 2023-12-19 LAB
EKG ATRIAL RATE: 66 BPM
EKG DIAGNOSIS: NORMAL
EKG P AXIS: 19 DEGREES
EKG P-R INTERVAL: 154 MS
EKG Q-T INTERVAL: 424 MS
EKG QRS DURATION: 80 MS
EKG QTC CALCULATION (BAZETT): 444 MS
EKG R AXIS: -28 DEGREES
EKG T AXIS: 70 DEGREES
EKG VENTRICULAR RATE: 66 BPM
GLUCOSE BLD-MCNC: 117 MG/DL (ref 70–99)
GLUCOSE BLD-MCNC: 183 MG/DL (ref 70–99)
GLUCOSE BLD-MCNC: 250 MG/DL (ref 70–99)
GLUCOSE BLD-MCNC: 272 MG/DL (ref 70–99)
PERFORMED ON: ABNORMAL

## 2023-12-19 PROCEDURE — C8923 2D TTE W OR W/O FOL W/CON,CO: HCPCS

## 2023-12-19 PROCEDURE — 93010 ELECTROCARDIOGRAM REPORT: CPT | Performed by: INTERNAL MEDICINE

## 2023-12-19 PROCEDURE — 1200000000 HC SEMI PRIVATE

## 2023-12-19 PROCEDURE — 2580000003 HC RX 258: Performed by: INTERNAL MEDICINE

## 2023-12-19 PROCEDURE — 6360000002 HC RX W HCPCS: Performed by: INTERNAL MEDICINE

## 2023-12-19 PROCEDURE — 99223 1ST HOSP IP/OBS HIGH 75: CPT | Performed by: PSYCHIATRY & NEUROLOGY

## 2023-12-19 PROCEDURE — 99222 1ST HOSP IP/OBS MODERATE 55: CPT | Performed by: INTERNAL MEDICINE

## 2023-12-19 PROCEDURE — 93306 TTE W/DOPPLER COMPLETE: CPT

## 2023-12-19 PROCEDURE — 6370000000 HC RX 637 (ALT 250 FOR IP): Performed by: INTERNAL MEDICINE

## 2023-12-19 RX ORDER — ACETAMINOPHEN 650 MG/1
650 SUPPOSITORY RECTAL EVERY 6 HOURS PRN
Status: DISCONTINUED | OUTPATIENT
Start: 2023-12-19 | End: 2023-12-20 | Stop reason: HOSPADM

## 2023-12-19 RX ORDER — INSULIN GLARGINE 100 [IU]/ML
30 INJECTION, SOLUTION SUBCUTANEOUS EVERY MORNING
Status: DISCONTINUED | OUTPATIENT
Start: 2023-12-19 | End: 2023-12-20 | Stop reason: HOSPADM

## 2023-12-19 RX ORDER — POTASSIUM CHLORIDE 20 MEQ/1
40 TABLET, EXTENDED RELEASE ORAL PRN
Status: DISCONTINUED | OUTPATIENT
Start: 2023-12-19 | End: 2023-12-20 | Stop reason: HOSPADM

## 2023-12-19 RX ORDER — SODIUM CHLORIDE 0.9 % (FLUSH) 0.9 %
5-40 SYRINGE (ML) INJECTION EVERY 12 HOURS SCHEDULED
Status: DISCONTINUED | OUTPATIENT
Start: 2023-12-19 | End: 2023-12-20 | Stop reason: HOSPADM

## 2023-12-19 RX ORDER — POLYETHYLENE GLYCOL 3350 17 G/17G
17 POWDER, FOR SOLUTION ORAL DAILY PRN
Status: DISCONTINUED | OUTPATIENT
Start: 2023-12-19 | End: 2023-12-20 | Stop reason: HOSPADM

## 2023-12-19 RX ORDER — POTASSIUM CHLORIDE 7.45 MG/ML
10 INJECTION INTRAVENOUS PRN
Status: DISCONTINUED | OUTPATIENT
Start: 2023-12-19 | End: 2023-12-20 | Stop reason: HOSPADM

## 2023-12-19 RX ORDER — MAGNESIUM SULFATE IN WATER 40 MG/ML
2000 INJECTION, SOLUTION INTRAVENOUS PRN
Status: DISCONTINUED | OUTPATIENT
Start: 2023-12-19 | End: 2023-12-20 | Stop reason: HOSPADM

## 2023-12-19 RX ORDER — ACETAMINOPHEN 325 MG/1
650 TABLET ORAL EVERY 6 HOURS PRN
Status: DISCONTINUED | OUTPATIENT
Start: 2023-12-19 | End: 2023-12-20 | Stop reason: HOSPADM

## 2023-12-19 RX ORDER — ONDANSETRON 2 MG/ML
4 INJECTION INTRAMUSCULAR; INTRAVENOUS EVERY 6 HOURS PRN
Status: DISCONTINUED | OUTPATIENT
Start: 2023-12-19 | End: 2023-12-20 | Stop reason: HOSPADM

## 2023-12-19 RX ORDER — ATORVASTATIN CALCIUM 40 MG/1
80 TABLET, FILM COATED ORAL NIGHTLY
Status: DISCONTINUED | OUTPATIENT
Start: 2023-12-19 | End: 2023-12-20 | Stop reason: HOSPADM

## 2023-12-19 RX ORDER — GLUCAGON 1 MG/ML
1 KIT INJECTION PRN
Status: DISCONTINUED | OUTPATIENT
Start: 2023-12-19 | End: 2023-12-20 | Stop reason: HOSPADM

## 2023-12-19 RX ORDER — DEXTROSE MONOHYDRATE 100 MG/ML
INJECTION, SOLUTION INTRAVENOUS CONTINUOUS PRN
Status: DISCONTINUED | OUTPATIENT
Start: 2023-12-19 | End: 2023-12-20 | Stop reason: HOSPADM

## 2023-12-19 RX ORDER — CARVEDILOL 6.25 MG/1
6.25 TABLET ORAL 2 TIMES DAILY
Status: DISCONTINUED | OUTPATIENT
Start: 2023-12-19 | End: 2023-12-20 | Stop reason: HOSPADM

## 2023-12-19 RX ORDER — ASPIRIN 81 MG/1
81 TABLET, CHEWABLE ORAL DAILY
Status: DISCONTINUED | OUTPATIENT
Start: 2023-12-19 | End: 2023-12-20 | Stop reason: HOSPADM

## 2023-12-19 RX ORDER — SODIUM CHLORIDE 0.9 % (FLUSH) 0.9 %
5-40 SYRINGE (ML) INJECTION PRN
Status: DISCONTINUED | OUTPATIENT
Start: 2023-12-19 | End: 2023-12-20 | Stop reason: HOSPADM

## 2023-12-19 RX ORDER — SODIUM CHLORIDE 9 MG/ML
INJECTION, SOLUTION INTRAVENOUS PRN
Status: DISCONTINUED | OUTPATIENT
Start: 2023-12-19 | End: 2023-12-20 | Stop reason: HOSPADM

## 2023-12-19 RX ORDER — ENOXAPARIN SODIUM 100 MG/ML
40 INJECTION SUBCUTANEOUS DAILY
Status: DISCONTINUED | OUTPATIENT
Start: 2023-12-19 | End: 2023-12-20 | Stop reason: HOSPADM

## 2023-12-19 RX ADMIN — Medication 10 ML: at 20:49

## 2023-12-19 RX ADMIN — CARVEDILOL 6.25 MG: 6.25 TABLET, FILM COATED ORAL at 00:42

## 2023-12-19 RX ADMIN — ENOXAPARIN SODIUM 40 MG: 100 INJECTION SUBCUTANEOUS at 07:42

## 2023-12-19 RX ADMIN — ATORVASTATIN CALCIUM 80 MG: 40 TABLET, FILM COATED ORAL at 20:47

## 2023-12-19 RX ADMIN — PREGABALIN 150 MG: 100 CAPSULE ORAL at 07:42

## 2023-12-19 RX ADMIN — INSULIN GLARGINE 30 UNITS: 100 INJECTION, SOLUTION SUBCUTANEOUS at 07:42

## 2023-12-19 RX ADMIN — CARVEDILOL 6.25 MG: 6.25 TABLET, FILM COATED ORAL at 07:43

## 2023-12-19 RX ADMIN — PREGABALIN 150 MG: 100 CAPSULE ORAL at 20:47

## 2023-12-19 RX ADMIN — ACETAMINOPHEN 650 MG: 325 TABLET ORAL at 23:22

## 2023-12-19 RX ADMIN — ATORVASTATIN CALCIUM 80 MG: 40 TABLET, FILM COATED ORAL at 00:42

## 2023-12-19 RX ADMIN — CARVEDILOL 6.25 MG: 6.25 TABLET, FILM COATED ORAL at 20:47

## 2023-12-19 RX ADMIN — ASPIRIN 81 MG: 81 TABLET, CHEWABLE ORAL at 07:42

## 2023-12-19 RX ADMIN — Medication 10 ML: at 07:43

## 2023-12-19 NOTE — H&P
V2.0  History and Physical      Name:  Payal Valdes /Age/Sex: 1950  (74 y.o. male)   MRN & CSN:  [id number] & [id number] Encounter Date/Time: 2023 5:14 AM EST   Location:  [facility]/2375-96 PCP: No primary care provider on file. Hospital Day: 2    Assessment and Plan:   Payal Valdes is a 74 y.o. male with a pmh of diabetes hypertension stroke who presents with TIA (transient ischemic attack)    Hospital Problems             Last Modified POA    * (Principal) TIA (transient ischemic attack) 2023 Yes    Chronic kidney disease 2023 Yes    Visual changes 2023 Yes    Hypertensive urgency 2023 Yes       Plan:  TIA  Indications  ABCD score > 3  1. Age  Age > or = 60= +1  Blood Pressure  B/P > or = 140/90 mmHg= +1      (Initial B/P reading either SBP>= 140 or DBP >=90)  Clinical Features of the TIA  Other Symptoms= 0  Duration of Symptoms  > or = 60 Minutes= +2   Diabetes  Hx of Diabetes= +1  Final ABCD Score= 4  Interventions  -Anticoagulation, aspirin, platelet inhibitor or documentation of contraindication; No  -Continuous cardiac monitoring; Yes  -Neurological assessment every 4 hours; Yes  -Diagnostic work-up pending: CT (or MRI) has been performed or is scheduled; Yes  Ultrasound of carotid artery  has been performed or is scheduled; No  -Cardiac Echocardiogram  has been performed or is scheduled; Yes  -Long history of dizziness evaluated at [facility] and treated for benign positional vertigo and ear infection  -Underwent CT head and neck W/ contrast at the outside hospital demonstrated;1. No large vessel occlusion in the head or neck. 2.  Left PCA is diffusely small and irregular with apparent severe stenosis  in the P1 segment. ED physician discussed the finding with neurointerventionist recommend medical management  -Neurology consult  -Aspirin and statin    2. Insulin-dependent diabetes mellitus  -Lantus insulin 30 units nightly  -Sliding scale    3. (5' 7\")         Medications Prior to Admission     Prior to Admission medications    Medication Sig Start Date End Date Taking? Authorizing Provider   carvedilol (COREG) 6.25 MG tablet Take 1 tablet by mouth 2 times daily 10/10/23  Yes Avni Cochran MD   acetaminophen (TYLENOL) 500 MG tablet Take 2 tablets by mouth every 6 hours as needed for Pain   Yes Avni Cochran MD   lidocaine-prilocaine (EMLA) 2.5-2.5 % cream Apply 1 application  topically as needed for Pain Apply topically as needed. Yes Avni Cochran MD   empagliflozin (JARDIANCE) 25 MG tablet Take 1 tablet by mouth daily    Avni Cochran MD   glipiZIDE (GLUCOTROL) 5 MG tablet Take 1 tablet by mouth daily    Avni Cochran MD   INSULIN GLARGINE, 1 UNIT DIAL, SC Inject into the skin    Avni Cochran MD   metFORMIN (GLUCOPHAGE) 500 MG tablet Take 1 tablet by mouth 2 times daily (with meals)    Avni Cochran MD   tamsulosin (FLOMAX) 0.4 MG capsule Take 0.4 mg by mouth nightly  Patient not taking: Reported on 12/18/2023    Avni Cochran MD   traMADol (ULTRAM) 50 MG tablet Take 50 mg by mouth every 8 hours as needed for Pain. 1 to 2 tablets for pain  Patient not taking: Reported on 12/18/2023    Avni Cochran MD   docusate sodium (COLACE) 100 MG capsule Take 1 capsule by mouth daily  Patient not taking: Reported on 12/18/2023 5/12/18   Libertad Ritchie MD   atorvastatin (LIPITOR) 10 MG tablet Take 2 tablets by mouth at bedtime    Avni Cochran MD   insulin glargine (LANTUS) 100 UNIT/ML injection Inject 10 Units into the skin nightly. Patient taking differently: Inject 38 Units into the skin every morning 6/1/10 12/18/23  Jeronimo Valenzuela MD   pregabalin (LYRICA) 100 MG capsule Take 2 capsules by mouth 2 times daily. 3/18/10   Avni Cochran MD       Physical Exam:        General: NAD  Eyes: EOMI  ENT: neck supple  Cardiovascular: Regular rate.   Respiratory: Clear to Yes

## 2023-12-19 NOTE — ACP (ADVANCE CARE PLANNING)
Advance Care Planning     General Advance Care Planning (ACP) Conversation    Date of Conversation: 12/18/2023  Conducted with: Patient with Decision Making Capacity    Healthcare Decision Maker:    Primary Decision Maker: Carla Dempsey - Spouse - [phone]  Click here to complete Healthcare Decision Makers including selection of the Healthcare Decision Maker Relationship (ie \"Primary\"). Today we documented Decision Maker(s) consistent with Legal Next of Kin hierarchy.     Content/Action Overview:  DECLINED ACP Conversation - will revisit periodically  Reviewed DNR/DNI and patient elects Full Code (Attempt Resuscitation)        Length of Voluntary ACP Conversation in minutes:  <16 minutes (Non-Billable)    Nic Blair, RN

## 2023-12-19 NOTE — ED NOTES
Report called to Roseline Basurto at [facility].      Aviva Emanuel RN  12/18/23 0234

## 2023-12-19 NOTE — PLAN OF CARE
Problem: Discharge Planning  Goal: Discharge to home or other facility with appropriate resources  12/19/2023 0907 by Wes Napier, RN  Outcome: Progressing  Flowsheets (Taken 12/19/2023 4227)  Discharge to home or other facility with appropriate resources: Identify barriers to discharge with patient and caregiver  12/19/2023 0028 by Ale Ravi RN  Outcome: Progressing  Flowsheets  Taken 12/19/2023 0028  Discharge to home or other facility with appropriate resources: Identify barriers to discharge with patient and caregiver  Taken 12/18/2023 2328  Discharge to home or other facility with appropriate resources: Identify barriers to discharge with patient and caregiver     Problem: Pain  Goal: Verbalizes/displays adequate comfort level or baseline comfort level  Outcome: Progressing

## 2023-12-19 NOTE — CARE COORDINATION
Case Management Assessment  Initial Evaluation    Date/Time of Evaluation: 12/19/2023 10:54 AM  Assessment Completed by: Mague Chu RN    If patient is discharged prior to next notation, then this note serves as note for discharge by case management. Patient Name: Derek Caldwell                   YOB: 1950  Diagnosis: Dizziness [R42]  Hypertensive urgency [I16.0]  Visual changes [H53.9]  Chronic kidney disease, unspecified CKD stage [N18.9]  TIA (transient ischemic attack) [G45.9]                   Date / Time: 12/18/2023  3:24 PM    Patient Admission Status: Inpatient   Readmission Risk (Low < 19, Mod (19-27), High > 27): Readmission Risk Score: 13.2    Current PCP: No primary care provider on file. PCP verified by CM? Yes (loan beckman)    Chart Reviewed: Yes      History Provided by: Patient  Patient Orientation: Alert and Oriented    Patient Cognition: Alert    Hospitalization in the last 30 days (Readmission):  No    If yes, Readmission Assessment in CM Navigator will be completed. Advance Directives:      Code Status: Full Code   Patient's Primary Decision Maker is: Legal Next of Kin    Primary Decision MakerMarnulfo Hernandez  [phone]    Discharge Planning:    Patient lives with: Spouse/Significant Other, Children Type of Home: House  Primary Care Giver: Self  Patient Support Systems include: Spouse/Significant Other, Children   Current Financial resources: Medicare  Current community resources: None  Current services prior to admission: Durable Medical Equipment            Current DME: Princess Barban, Wheelchair, Shower Chair, Other (Comment) (ramp)            Type of Home Care services:  None    ADLS  Prior functional level: Independent in ADLs/IADLs  Current functional level: Independent in ADLs/IADLs    PT AM-PAC:   /24  OT AM-PAC:   /24    Family can provide assistance at DC:  Yes  Would you like Case Management to discuss the discharge plan with any other family

## 2023-12-20 ENCOUNTER — APPOINTMENT (OUTPATIENT)
Dept: MRI IMAGING | Age: 73
DRG: 884 | End: 2023-12-20
Payer: OTHER GOVERNMENT

## 2023-12-20 ENCOUNTER — TELEPHONE (OUTPATIENT)
Dept: PULMONOLOGY | Age: 73
End: 2023-12-20

## 2023-12-20 VITALS
DIASTOLIC BLOOD PRESSURE: 84 MMHG | BODY MASS INDEX: 27.66 KG/M2 | WEIGHT: 176.25 LBS | OXYGEN SATURATION: 97 % | TEMPERATURE: 97.9 F | HEART RATE: 66 BPM | HEIGHT: 67 IN | SYSTOLIC BLOOD PRESSURE: 134 MMHG | RESPIRATION RATE: 17 BRPM

## 2023-12-20 DIAGNOSIS — R91.1 LUNG NODULE: Primary | ICD-10-CM

## 2023-12-20 LAB
GLUCOSE BLD-MCNC: 111 MG/DL (ref 70–99)
GLUCOSE BLD-MCNC: 221 MG/DL (ref 70–99)
PERFORMED ON: ABNORMAL
PERFORMED ON: ABNORMAL

## 2023-12-20 PROCEDURE — 99233 SBSQ HOSP IP/OBS HIGH 50: CPT | Performed by: PSYCHIATRY & NEUROLOGY

## 2023-12-20 PROCEDURE — 99239 HOSP IP/OBS DSCHRG MGMT >30: CPT | Performed by: INTERNAL MEDICINE

## 2023-12-20 PROCEDURE — 70551 MRI BRAIN STEM W/O DYE: CPT

## 2023-12-20 PROCEDURE — 2580000003 HC RX 258: Performed by: INTERNAL MEDICINE

## 2023-12-20 PROCEDURE — 6360000002 HC RX W HCPCS: Performed by: INTERNAL MEDICINE

## 2023-12-20 PROCEDURE — 6370000000 HC RX 637 (ALT 250 FOR IP): Performed by: INTERNAL MEDICINE

## 2023-12-20 RX ORDER — MEMANTINE HYDROCHLORIDE 5 MG/1
5 TABLET ORAL 2 TIMES DAILY
Status: DISCONTINUED | OUTPATIENT
Start: 2023-12-20 | End: 2023-12-20 | Stop reason: HOSPADM

## 2023-12-20 RX ORDER — MEMANTINE HYDROCHLORIDE 5 MG/1
5 TABLET ORAL 2 TIMES DAILY
Qty: 60 TABLET | Refills: 1 | Status: SHIPPED | OUTPATIENT
Start: 2023-12-20

## 2023-12-20 RX ORDER — ASPIRIN 81 MG/1
81 TABLET ORAL DAILY
Qty: 30 TABLET | Refills: 2 | COMMUNITY
Start: 2023-12-20

## 2023-12-20 RX ORDER — ATORVASTATIN CALCIUM 80 MG/1
80 TABLET, FILM COATED ORAL NIGHTLY
Qty: 30 TABLET | Refills: 3 | Status: SHIPPED | OUTPATIENT
Start: 2023-12-20

## 2023-12-20 RX ORDER — INSULIN GLARGINE 100 [IU]/ML
38 INJECTION, SOLUTION SUBCUTANEOUS EVERY MORNING
COMMUNITY
Start: 2023-12-20

## 2023-12-20 RX ADMIN — ENOXAPARIN SODIUM 40 MG: 100 INJECTION SUBCUTANEOUS at 07:59

## 2023-12-20 RX ADMIN — Medication 10 ML: at 08:04

## 2023-12-20 RX ADMIN — ASPIRIN 81 MG: 81 TABLET, CHEWABLE ORAL at 07:59

## 2023-12-20 RX ADMIN — PREGABALIN 150 MG: 100 CAPSULE ORAL at 07:59

## 2023-12-20 RX ADMIN — INSULIN GLARGINE 30 UNITS: 100 INJECTION, SOLUTION SUBCUTANEOUS at 07:58

## 2023-12-20 RX ADMIN — CARVEDILOL 6.25 MG: 6.25 TABLET, FILM COATED ORAL at 07:59

## 2023-12-20 NOTE — PLAN OF CARE
Problem: Discharge Planning  Goal: Discharge to home or other facility with appropriate resources  Outcome: Progressing     Problem: Pain  Goal: Verbalizes/displays adequate comfort level or baseline comfort level  Outcome: Progressing  Flowsheets (Taken 12/20/2023 0822)  Verbalizes/displays adequate comfort level or baseline comfort level:   Encourage patient to monitor pain and request assistance   Administer analgesics based on type and severity of pain and evaluate response   Assess pain using appropriate pain scale     Problem: Safety - Adult  Goal: Free from fall injury  Outcome: Progressing  Flowsheets (Taken 12/19/2023 0028 by Saint Sale, RN)  Free From Fall Injury: Instruct family/caregiver on patient safety

## 2023-12-20 NOTE — PLAN OF CARE
Problem: Discharge Planning  Goal: Discharge to home or other facility with appropriate resources  12/20/2023 1737 by Fabian Paredes RN  Outcome: Adequate for Discharge  12/20/2023 5047 by Fabian Paredes RN  Outcome: Progressing     Problem: Pain  Goal: Verbalizes/displays adequate comfort level or baseline comfort level  12/20/2023 1737 by Fabian Paredes RN  Outcome: Adequate for Discharge  12/20/2023 4616 by Fabian Paredes RN  Outcome: Progressing  Flowsheets (Taken 12/20/2023 6235)  Verbalizes/displays adequate comfort level or baseline comfort level:   Encourage patient to monitor pain and request assistance   Administer analgesics based on type and severity of pain and evaluate response   Assess pain using appropriate pain scale     Problem: Safety - Adult  Goal: Free from fall injury  12/20/2023 1737 by Fabian Paredes RN  Outcome: Adequate for Discharge  12/20/2023 5445 by Fabian Paredes RN  Outcome: Progressing  Flowsheets (Taken 12/19/2023 0028 by Emmanuel Diaz RN)  Free From Fall Injury: Instruct family/caregiver on patient safety     Problem: ABCDS Injury Assessment  Goal: Absence of physical injury  Outcome: Adequate for Discharge     Problem: Neurosensory - Adult  Goal: Achieves stable or improved neurological status  Outcome: Adequate for Discharge     Problem: Cardiovascular - Adult  Goal: Maintains optimal cardiac output and hemodynamic stability  Outcome: Adequate for Discharge     Problem: Chronic Conditions and Co-morbidities  Goal: Patient's chronic conditions and co-morbidity symptoms are monitored and maintained or improved  Outcome: Adequate for Discharge

## 2023-12-20 NOTE — DISCHARGE SUMMARY
Name:  Wendy Petersen  Room:  /7824-06  MRN:    [id number]    Discharge Summary      This discharge summary is in conjunction with a complete physical exam done on the day of discharge. Discharging Provider: Dr. Christiano Woodall MD      Admit: 12/18/2023  Discharge: 12/20/23      HPI taken from admission H&P:    Archie Marcus is a 74 y.o. male with pmh of stroke with left side residual deficit. Diabetes and hypertension who presented to the emergency department because of dizziness stated that since Sunday he has been having this dizziness in the form of lightheadedness associated with some tingling in his both hand he has some residual weakness in his left side due to prior stroke also using a walker for ambulation's when he check his blood pressure is systolic was more than 940 and his blood pressure also was high he denies any chest pain fever or loss of consciousness.  Patient has longstanding history of dizziness has been evaluated at the [place] before     Diagnoses this Admission and Hospital Course    #Vertigo  # Ataxia   # H/O CVA, ruled out TIA  -pt presented with worsening left sided paresthesia and new visual disturbances  -long history of dizziness evaluated at [facility] and treated for benign positional vertigo and ear infection  -CT head no acute intracranial abnormality  -CTA head and neck no LVO, P1 segment of left PCA with severe stenosis  -NSGY consulted in ED - no acute intervention needed at this time   -echo normal EF, G1DD, no cardiac thrombus   -MRI non-acute shows sequelae of prior hemorrhagic infarct involving posterior right frontal corona radiata and loss of normal signal in right vertebral artery   -asa, high intensity statin  -fall precautions and neuro checks   -PT/OT/ST  -Neurology consulted : plan of care discussed with neurology; symptoms of vertigo appears to be central in origin  from underlying dementia   -okay to discharge on aspirin and statins;  neurology is also

## 2023-12-20 NOTE — DISCHARGE INSTR - COC
Continuity of Care Form    Patient Name: Myriam Underwood   :  1950  MRN:  [id number]    Admit date:  2023  Discharge date:  ***    Code Status Order: Full Code   Advance Directives:     Admitting Physician:  Herminia Hilario MD  PCP: No primary care provider on file.     Discharging Nurse: [facility] Unit/Room#: /3663-44  Discharging Unit Phone Number: ***    Emergency Contact:   Extended Emergency Contact Information  Primary Emergency Contact: Loraine Cortez  Address: [address], [address] [facility] [address] [address] [place] Phone: [phone]  Mobile Phone: [phone]  Relation: Spouse    Past Surgical History:  Past Surgical History:   Procedure Laterality Date    CATARACT REMOVAL WITH IMPLANT Right     CYSTOSCOPY      GA ROPRTJ CRTD TEAEC > 1 MO AFTER ORIGINAL OPRATIO Left 2018    LEFT CAROTID ENDARTERECTOMY performed by Edu Bartholomew MD at [address] [facility] [address]      neg prostate biopsy     SHOULDER SURGERY      JOINT CLEANING    TURP         Immunization History:   Immunization History   Administered Date(s) Administered    COVID-19, PFIZER PURPLE top, DILUTE for use, (age 15 y+), 30mcg/0.3mL 2021, 2021, 2021    Influenza Virus Vaccine 2009    Pneumococcal, PPSV23, PNEUMOVAX 21, (age 2y+), SC/IM, 0.5mL 2007       Active Problems:  Patient Active Problem List   Diagnosis Code    Type II or unspecified type diabetes mellitus without mention of complication, not stated as uncontrolled E11.9    Chronic back pain M54.9, G89.29    Essential hypertension I10    Gout     Carotid artery occlusion I65.29    Cerebrovascular accident (CVA) ([address]) I63.9    Intracranial hemorrhage ([address]) I62.9    TIA (transient ischemic attack) G45.9    DM (diabetes mellitus), type 2 ([address]) E11.9    Left carotid artery stenosis I65.22    Carotid stenosis, left I65.22    Dizziness R42    Dyslipidemia E78.5    Nontraumatic cortical hemorrhage

## 2023-12-20 NOTE — TELEPHONE ENCOUNTER
Pt is still inpt- needs sched w/ any provider after PET SCAN. Pt needs sched for pet scan.       Jefe Perez MD  P [facility] Support; Rosalee Arredondo MA  The pt needs non-urgent hospital follow up with a PET CT before the visit for a lung nodule. Can be next available provider. Please order the PET CT under the provider who will be seeing the patient in office to make sure they receive the results. Thank you.

## 2023-12-20 NOTE — CARE COORDINATION
Pt to DC home with spouse. Denies any DC or DME needs at this time. Pt, RN and CM all aware of DC home.  Pt declines any need for HHC    97% RA

## 2023-12-22 NOTE — TELEPHONE ENCOUNTER
Pt d/c 12-      Called [phone] (home)   Spoke w/ pt and pt prefers [place]. [facility]. [facility] is to speak w/ [facility] next Thursday.     Called [phone]  Spoke w/ yvonne and set up pet 1-2-24 @ 0830 arrival @ 0800.     Called [phone] (home)   Spoke w/ pt and informed of date and arrival. Pt understood.     Follow to ensure pa goes through- if pa fails pt wants to have at [facility].

## 2023-12-26 PROBLEM — F03.A0 MILD DEMENTIA WITHOUT BEHAVIORAL DISTURBANCE, PSYCHOTIC DISTURBANCE, MOOD DISTURBANCE, OR ANXIETY (HCC): Status: ACTIVE | Noted: 2023-12-26

## 2023-12-29 NOTE — TELEPHONE ENCOUNTER
Pt is aware that [facility] didn't get back with us about the PA for his PET scan.  PET is canceled due to no PA.

## 2024-01-02 NOTE — TELEPHONE ENCOUNTER
Called [facility] spoke w/ Citlali.   Citlali asked for orders/last note/imaging be faxed to [phone].     Follow for results

## 2024-01-02 NOTE — TELEPHONE ENCOUNTER
Called [phone] (home)   [facility] to call office     [facility] was working on PA- pt would have been notified through them not the [facility]. Need to find out if pt is going to have pet through [facility].

## 2024-01-03 NOTE — TELEPHONE ENCOUNTER
Citlali from [facility] called stating that she got the fax and the PET scan has already been approved through the [facility] for a community PET scan at any facility.  Asked Citlali if she can send the approval and she said she doesn't have that available.  Citlali states that information or questions about the auth can be obtained by calling [phone] through the [facility].

## 2024-01-04 NOTE — TELEPHONE ENCOUNTER
Pt called and stated Jan 23 @ am in [place].    Follow for results    Pt has appt on 02-08-24 at 1300.

## 2024-01-16 ENCOUNTER — HOSPITAL ENCOUNTER (OUTPATIENT)
Dept: PET IMAGING | Age: 74
Discharge: HOME | End: 2024-01-16
Payer: MEDICARE

## 2024-01-16 DIAGNOSIS — R91.1 LUNG NODULE: ICD-10-CM

## 2024-01-16 PROCEDURE — 78815 PET IMAGE W/CT SKULL-THIGH: CPT

## 2024-01-16 PROCEDURE — 3430000000 HC RX DIAGNOSTIC RADIOPHARMACEUTICAL: Performed by: INTERNAL MEDICINE

## 2024-01-16 PROCEDURE — A9609 HC RX DIAGNOSTIC RADIOPHARMACEUTICAL: HCPCS | Performed by: INTERNAL MEDICINE

## 2024-01-16 RX ORDER — FLUDEOXYGLUCOSE F 18 200 MCI/ML
13.37 INJECTION, SOLUTION INTRAVENOUS
Status: COMPLETED | OUTPATIENT
Start: 2024-01-16 | End: 2024-01-16

## 2024-01-16 RX ADMIN — FLUDEOXYGLUCOSE F 18 13.37 MILLICURIE: 200 INJECTION, SOLUTION INTRAVENOUS at 10:06

## 2024-01-23 ENCOUNTER — ANESTHESIA EVENT (OUTPATIENT)
Dept: ENDOSCOPY | Age: 74
End: 2024-01-23
Payer: OTHER GOVERNMENT

## 2024-01-23 NOTE — TELEPHONE ENCOUNTER
Pt was out with some friends and didn't want to forget about the appointment will call back later this afternoon.

## 2024-01-23 NOTE — TELEPHONE ENCOUNTER
Bronch w/ [facility] sched 02-02-24 @ 1230 arrival time of 1130.     Pt needs informed please call pt and inform. PT has appt 1-30-24- may need to r/s or keep.     Bronchoscopy has been set up for 02-02-24 arrival time 1130 at [facility].  Please enter at [facility] Entrance.   Nothing to eat or drink after midnight prior to the procedure.   Must have a  to bring you to and from the procedure.   Hold blood thinners as instructed prior to the procedure.

## 2024-01-24 NOTE — PROGRESS NOTES
.1.  Do not eat or drink anything after 12 midnight prior to surgery.  This includes no water, chewing gum or mints, except for bowel prep complete per MD.  2.  Take the following pills with a small sip of water on the morning of surgery.  3.  Aspirin, Ibuprofen, Advil, Naproxen, Vitamin E and other Anti-inflammatory products should be stopped for 5 days before surgery or as directed by your physician.  4.  Check with your doctor regarding stopping Plavix, Coumadin, Lovenox, Fragmin or other blood thinners.  5.  Do not smoke and do not drink alcoholic beverages 24 hours prior to surgery.  This includes NA Beer.  6.  You may brush your teeth and gargle the morning of surgery.  DO NOT SWALLOW WATER.  7.  You MUST make arrangements for a responsible adult to take you home after your surgery.  You will not be allowed to leave alone or drive yourself home.  It is strongly suggested someone stay with you the first 24 hours.  Your surgery will be cancelled if you do not have a ride home.  8.  A parent/legal guardian must accompany a child scheduled for surgery and plan to stay at the hospital until the child is discharged.  Please do not bring other children with you.  9.  Please wear simple, loose fitting clothing to the hospital.  Do not bring valuables ( money, credit cards, checkbooks, etc.)  Do not wear any makeup (including no eye makeup) or nail polish on your fingers or toes.  10.  Do not wear any jewelry or piercing on the day of surgery.  All body piercing jewelry must be removed.  11.  If you have dentures, they will be removed before going to the OR; we will provide you a container.  If you wear contact lenses or glasses, they will be removed; please bring a case for them.  12.  Notify your Surgeon if you develop any illness between now and surgery time; cough, cold, fever, sore throat, nausea, vomiting, etc.  Please notify your surgeon if you experience dizziness, shortness of breath or blurred vision between

## 2024-01-26 ENCOUNTER — HOSPITAL ENCOUNTER (OUTPATIENT)
Dept: CT IMAGING | Age: 74
Discharge: HOME | End: 2024-01-26
Attending: INTERNAL MEDICINE
Payer: MEDICARE

## 2024-01-26 DIAGNOSIS — R91.1 LUNG NODULE: ICD-10-CM

## 2024-01-26 PROCEDURE — 71250 CT THORAX DX C-: CPT

## 2024-01-30 ENCOUNTER — OFFICE VISIT (OUTPATIENT)
Dept: PULMONOLOGY | Age: 74
End: 2024-01-30
Payer: MEDICARE

## 2024-01-30 VITALS
RESPIRATION RATE: 18 BRPM | HEIGHT: 67 IN | DIASTOLIC BLOOD PRESSURE: 80 MMHG | OXYGEN SATURATION: 95 % | HEART RATE: 76 BPM | SYSTOLIC BLOOD PRESSURE: 130 MMHG | BODY MASS INDEX: 28.25 KG/M2 | WEIGHT: 180 LBS

## 2024-01-30 DIAGNOSIS — R91.8 LUNG MASS: Primary | ICD-10-CM

## 2024-01-30 PROCEDURE — 1036F TOBACCO NON-USER: CPT | Performed by: INTERNAL MEDICINE

## 2024-01-30 PROCEDURE — 3017F COLORECTAL CA SCREEN DOC REV: CPT | Performed by: INTERNAL MEDICINE

## 2024-01-30 PROCEDURE — G8419 CALC BMI OUT NRM PARAM NOF/U: HCPCS | Performed by: INTERNAL MEDICINE

## 2024-01-30 PROCEDURE — 3079F DIAST BP 80-89 MM HG: CPT | Performed by: INTERNAL MEDICINE

## 2024-01-30 PROCEDURE — 3075F SYST BP GE 130 - 139MM HG: CPT | Performed by: INTERNAL MEDICINE

## 2024-01-30 PROCEDURE — 99214 OFFICE O/P EST MOD 30 MIN: CPT | Performed by: INTERNAL MEDICINE

## 2024-01-30 PROCEDURE — G8484 FLU IMMUNIZE NO ADMIN: HCPCS | Performed by: INTERNAL MEDICINE

## 2024-01-30 PROCEDURE — G8427 DOCREV CUR MEDS BY ELIG CLIN: HCPCS | Performed by: INTERNAL MEDICINE

## 2024-01-30 PROCEDURE — 1123F ACP DISCUSS/DSCN MKR DOCD: CPT | Performed by: INTERNAL MEDICINE

## 2024-02-02 ENCOUNTER — APPOINTMENT (OUTPATIENT)
Dept: GENERAL RADIOLOGY | Age: 74
End: 2024-02-02
Attending: INTERNAL MEDICINE
Payer: OTHER GOVERNMENT

## 2024-02-02 ENCOUNTER — HOSPITAL ENCOUNTER (OUTPATIENT)
Age: 74
Setting detail: OUTPATIENT SURGERY
Discharge: HOME | End: 2024-02-02
Attending: INTERNAL MEDICINE | Admitting: INTERNAL MEDICINE
Payer: OTHER GOVERNMENT

## 2024-02-02 ENCOUNTER — HOSPITAL ENCOUNTER (OUTPATIENT)
Age: 74
Discharge: HOME | End: 2024-02-02
Payer: MEDICARE

## 2024-02-02 ENCOUNTER — ANESTHESIA (OUTPATIENT)
Dept: ENDOSCOPY | Age: 74
End: 2024-02-02
Payer: OTHER GOVERNMENT

## 2024-02-02 VITALS
TEMPERATURE: 97.1 F | WEIGHT: 180 LBS | DIASTOLIC BLOOD PRESSURE: 87 MMHG | HEIGHT: 67 IN | OXYGEN SATURATION: 99 % | SYSTOLIC BLOOD PRESSURE: 188 MMHG | BODY MASS INDEX: 28.25 KG/M2 | HEART RATE: 67 BPM | RESPIRATION RATE: 15 BRPM

## 2024-02-02 DIAGNOSIS — R91.8 LUNG MASS: ICD-10-CM

## 2024-02-02 LAB
APPEARANCE BRONCH: CLEAR
CLOT SPEC QL: ABNORMAL
COLOR BRONCH: COLORLESS
GLUCOSE BLD-MCNC: 156 MG/DL (ref 70–99)
GLUCOSE BLD-MCNC: 211 MG/DL (ref 70–99)
LYMPHOCYTES NFR BRONCH MANUAL: 29 % (ref 5–10)
MACROPHAGES NFR BRONCH MANUAL: 56 % (ref 90–95)
NEUTROPHILS NFR BRONCH MANUAL: 15 % (ref 5–10)
NUC CELL # BRONCH MANUAL: 30 /CUMM
PERFORMED ON: ABNORMAL
PERFORMED ON: ABNORMAL
RBC # FLD MANUAL: 800 /CUMM
TOTAL CELLS COUNTED BRONCH: 100

## 2024-02-02 PROCEDURE — 86612 BLASTOMYCES ANTIBODY: CPT

## 2024-02-02 PROCEDURE — 7100000010 HC PHASE II RECOVERY - FIRST 15 MIN: Performed by: INTERNAL MEDICINE

## 2024-02-02 PROCEDURE — 88172 CYTP DX EVAL FNA 1ST EA SITE: CPT

## 2024-02-02 PROCEDURE — 2580000003 HC RX 258: Performed by: ANESTHESIOLOGY

## 2024-02-02 PROCEDURE — 2500000003 HC RX 250 WO HCPCS: Performed by: NURSE ANESTHETIST, CERTIFIED REGISTERED

## 2024-02-02 PROCEDURE — 3609010800 HC BRONCHOSCOPY ALVEOLAR LAVAGE: Performed by: INTERNAL MEDICINE

## 2024-02-02 PROCEDURE — 3609020000 HC BRONCHOSCOPY W/EBUS FNA: Performed by: INTERNAL MEDICINE

## 2024-02-02 PROCEDURE — 2709999900 HC NON-CHARGEABLE SUPPLY: Performed by: INTERNAL MEDICINE

## 2024-02-02 PROCEDURE — 7100000000 HC PACU RECOVERY - FIRST 15 MIN: Performed by: INTERNAL MEDICINE

## 2024-02-02 PROCEDURE — 88177 CYTP FNA EVAL EA ADDL: CPT

## 2024-02-02 PROCEDURE — 87070 CULTURE OTHR SPECIMN AEROBIC: CPT

## 2024-02-02 PROCEDURE — 6360000002 HC RX W HCPCS: Performed by: NURSE ANESTHETIST, CERTIFIED REGISTERED

## 2024-02-02 PROCEDURE — 87102 FUNGUS ISOLATION CULTURE: CPT

## 2024-02-02 PROCEDURE — 3700000001 HC ADD 15 MINUTES (ANESTHESIA): Performed by: INTERNAL MEDICINE

## 2024-02-02 PROCEDURE — 3609011900 HC BRONCHOSCOPY NEEDLE BX TRACHEA MAIN STEM&/BRON: Performed by: INTERNAL MEDICINE

## 2024-02-02 PROCEDURE — 76000 FLUOROSCOPY <1 HR PHYS/QHP: CPT

## 2024-02-02 PROCEDURE — 87205 SMEAR GRAM STAIN: CPT

## 2024-02-02 PROCEDURE — 86698 HISTOPLASMA ANTIBODY: CPT

## 2024-02-02 PROCEDURE — 88112 CYTOPATH CELL ENHANCE TECH: CPT

## 2024-02-02 PROCEDURE — 7100000001 HC PACU RECOVERY - ADDTL 15 MIN: Performed by: INTERNAL MEDICINE

## 2024-02-02 PROCEDURE — 3609011100 HC BRONCHOSCOPY BRUSHINGS: Performed by: INTERNAL MEDICINE

## 2024-02-02 PROCEDURE — 3603165200 HC BRNCHSC EBUS GUIDED SAMPL 1/2 NODE STATION/STRUX: Performed by: INTERNAL MEDICINE

## 2024-02-02 PROCEDURE — 2720000010 HC SURG SUPPLY STERILE: Performed by: INTERNAL MEDICINE

## 2024-02-02 PROCEDURE — 3700000000 HC ANESTHESIA ATTENDED CARE: Performed by: INTERNAL MEDICINE

## 2024-02-02 PROCEDURE — 88305 TISSUE EXAM BY PATHOLOGIST: CPT

## 2024-02-02 PROCEDURE — 7100000011 HC PHASE II RECOVERY - ADDTL 15 MIN: Performed by: INTERNAL MEDICINE

## 2024-02-02 PROCEDURE — 87116 MYCOBACTERIA CULTURE: CPT

## 2024-02-02 PROCEDURE — 87206 SMEAR FLUORESCENT/ACID STAI: CPT

## 2024-02-02 PROCEDURE — 89051 BODY FLUID CELL COUNT: CPT

## 2024-02-02 PROCEDURE — 71045 X-RAY EXAM CHEST 1 VIEW: CPT

## 2024-02-02 PROCEDURE — 88173 CYTOPATH EVAL FNA REPORT: CPT

## 2024-02-02 PROCEDURE — 86480 TB TEST CELL IMMUN MEASURE: CPT

## 2024-02-02 PROCEDURE — 2500000003 HC RX 250 WO HCPCS: Performed by: ANESTHESIOLOGY

## 2024-02-02 RX ORDER — SODIUM CHLORIDE 0.9 % (FLUSH) 0.9 %
5-40 SYRINGE (ML) INJECTION PRN
Status: DISCONTINUED | OUTPATIENT
Start: 2024-02-02 | End: 2024-02-02 | Stop reason: HOSPADM

## 2024-02-02 RX ORDER — ONDANSETRON 2 MG/ML
4 INJECTION INTRAMUSCULAR; INTRAVENOUS
Status: DISCONTINUED | OUTPATIENT
Start: 2024-02-02 | End: 2024-02-02 | Stop reason: HOSPADM

## 2024-02-02 RX ORDER — SODIUM CHLORIDE 0.9 % (FLUSH) 0.9 %
5-40 SYRINGE (ML) INJECTION EVERY 12 HOURS SCHEDULED
Status: DISCONTINUED | OUTPATIENT
Start: 2024-02-02 | End: 2024-02-02 | Stop reason: HOSPADM

## 2024-02-02 RX ORDER — DIPHENHYDRAMINE HYDROCHLORIDE 50 MG/ML
12.5 INJECTION INTRAMUSCULAR; INTRAVENOUS
Status: DISCONTINUED | OUTPATIENT
Start: 2024-02-02 | End: 2024-02-02 | Stop reason: HOSPADM

## 2024-02-02 RX ORDER — FAMOTIDINE 10 MG/ML
20 INJECTION, SOLUTION INTRAVENOUS ONCE
Status: COMPLETED | OUTPATIENT
Start: 2024-02-02 | End: 2024-02-02

## 2024-02-02 RX ORDER — ROCURONIUM BROMIDE 10 MG/ML
INJECTION, SOLUTION INTRAVENOUS PRN
Status: DISCONTINUED | OUTPATIENT
Start: 2024-02-02 | End: 2024-02-02 | Stop reason: SDUPTHER

## 2024-02-02 RX ORDER — SODIUM CHLORIDE, SODIUM LACTATE, POTASSIUM CHLORIDE, CALCIUM CHLORIDE 600; 310; 30; 20 MG/100ML; MG/100ML; MG/100ML; MG/100ML
INJECTION, SOLUTION INTRAVENOUS CONTINUOUS
Status: DISCONTINUED | OUTPATIENT
Start: 2024-02-02 | End: 2024-02-02 | Stop reason: HOSPADM

## 2024-02-02 RX ORDER — LABETALOL HYDROCHLORIDE 5 MG/ML
10 INJECTION, SOLUTION INTRAVENOUS
Status: DISCONTINUED | OUTPATIENT
Start: 2024-02-02 | End: 2024-02-02 | Stop reason: HOSPADM

## 2024-02-02 RX ORDER — DEXAMETHASONE SODIUM PHOSPHATE 4 MG/ML
INJECTION, SOLUTION INTRA-ARTICULAR; INTRALESIONAL; INTRAMUSCULAR; INTRAVENOUS; SOFT TISSUE PRN
Status: DISCONTINUED | OUTPATIENT
Start: 2024-02-02 | End: 2024-02-02 | Stop reason: SDUPTHER

## 2024-02-02 RX ORDER — FENTANYL CITRATE 50 UG/ML
INJECTION, SOLUTION INTRAMUSCULAR; INTRAVENOUS PRN
Status: DISCONTINUED | OUTPATIENT
Start: 2024-02-02 | End: 2024-02-02 | Stop reason: SDUPTHER

## 2024-02-02 RX ORDER — ONDANSETRON 2 MG/ML
INJECTION INTRAMUSCULAR; INTRAVENOUS PRN
Status: DISCONTINUED | OUTPATIENT
Start: 2024-02-02 | End: 2024-02-02 | Stop reason: SDUPTHER

## 2024-02-02 RX ORDER — OXYCODONE HYDROCHLORIDE 5 MG/1
5 TABLET ORAL PRN
Status: DISCONTINUED | OUTPATIENT
Start: 2024-02-02 | End: 2024-02-02 | Stop reason: HOSPADM

## 2024-02-02 RX ORDER — SODIUM CHLORIDE 9 MG/ML
INJECTION, SOLUTION INTRAVENOUS PRN
Status: DISCONTINUED | OUTPATIENT
Start: 2024-02-02 | End: 2024-02-02 | Stop reason: HOSPADM

## 2024-02-02 RX ORDER — OXYCODONE HYDROCHLORIDE 5 MG/1
10 TABLET ORAL PRN
Status: DISCONTINUED | OUTPATIENT
Start: 2024-02-02 | End: 2024-02-02 | Stop reason: HOSPADM

## 2024-02-02 RX ORDER — PROPOFOL 10 MG/ML
INJECTION, EMULSION INTRAVENOUS PRN
Status: DISCONTINUED | OUTPATIENT
Start: 2024-02-02 | End: 2024-02-02 | Stop reason: SDUPTHER

## 2024-02-02 RX ORDER — LIDOCAINE HYDROCHLORIDE 20 MG/ML
INJECTION, SOLUTION INFILTRATION; PERINEURAL PRN
Status: DISCONTINUED | OUTPATIENT
Start: 2024-02-02 | End: 2024-02-02 | Stop reason: SDUPTHER

## 2024-02-02 RX ADMIN — PROPOFOL 50 MG: 10 INJECTION, EMULSION INTRAVENOUS at 13:27

## 2024-02-02 RX ADMIN — ROCURONIUM BROMIDE 30 MG: 10 INJECTION, SOLUTION INTRAVENOUS at 13:26

## 2024-02-02 RX ADMIN — PHENYLEPHRINE HYDROCHLORIDE 200 MCG: 10 INJECTION INTRAVENOUS at 13:57

## 2024-02-02 RX ADMIN — PHENYLEPHRINE HYDROCHLORIDE 100 MCG: 10 INJECTION INTRAVENOUS at 14:38

## 2024-02-02 RX ADMIN — SODIUM CHLORIDE, POTASSIUM CHLORIDE, SODIUM LACTATE AND CALCIUM CHLORIDE: 600; 310; 30; 20 INJECTION, SOLUTION INTRAVENOUS at 13:25

## 2024-02-02 RX ADMIN — SUGAMMADEX 200 MG: 100 INJECTION, SOLUTION INTRAVENOUS at 14:47

## 2024-02-02 RX ADMIN — PHENYLEPHRINE HYDROCHLORIDE 200 MCG: 10 INJECTION INTRAVENOUS at 14:08

## 2024-02-02 RX ADMIN — FENTANYL CITRATE 100 MCG: 50 INJECTION INTRAMUSCULAR; INTRAVENOUS at 13:26

## 2024-02-02 RX ADMIN — PHENYLEPHRINE HYDROCHLORIDE 100 MCG: 10 INJECTION INTRAVENOUS at 14:24

## 2024-02-02 RX ADMIN — SODIUM CHLORIDE, POTASSIUM CHLORIDE, SODIUM LACTATE AND CALCIUM CHLORIDE: 600; 310; 30; 20 INJECTION, SOLUTION INTRAVENOUS at 14:17

## 2024-02-02 RX ADMIN — PHENYLEPHRINE HYDROCHLORIDE 200 MCG: 10 INJECTION INTRAVENOUS at 14:35

## 2024-02-02 RX ADMIN — PHENYLEPHRINE HYDROCHLORIDE 200 MCG: 10 INJECTION INTRAVENOUS at 13:31

## 2024-02-02 RX ADMIN — FAMOTIDINE 20 MG: 10 INJECTION, SOLUTION INTRAVENOUS at 12:23

## 2024-02-02 RX ADMIN — ONDANSETRON 4 MG: 2 INJECTION INTRAMUSCULAR; INTRAVENOUS at 14:47

## 2024-02-02 RX ADMIN — DEXAMETHASONE SODIUM PHOSPHATE 4 MG: 4 INJECTION INTRA-ARTICULAR; INTRALESIONAL; INTRAMUSCULAR; INTRAVENOUS; SOFT TISSUE at 13:36

## 2024-02-02 RX ADMIN — PHENYLEPHRINE HYDROCHLORIDE 200 MCG: 10 INJECTION INTRAVENOUS at 13:45

## 2024-02-02 RX ADMIN — PHENYLEPHRINE HYDROCHLORIDE 100 MCG: 10 INJECTION INTRAVENOUS at 14:18

## 2024-02-02 RX ADMIN — LIDOCAINE HYDROCHLORIDE 100 MG: 20 INJECTION, SOLUTION INFILTRATION; PERINEURAL at 13:26

## 2024-02-02 RX ADMIN — PHENYLEPHRINE HYDROCHLORIDE 100 MCG: 10 INJECTION INTRAVENOUS at 14:25

## 2024-02-02 RX ADMIN — PROPOFOL 150 MG: 10 INJECTION, EMULSION INTRAVENOUS at 13:26

## 2024-02-02 ASSESSMENT — PAIN - FUNCTIONAL ASSESSMENT: PAIN_FUNCTIONAL_ASSESSMENT: 0-10

## 2024-02-02 NOTE — ANESTHESIA PRE PROCEDURE
Department of Anesthesiology  Preprocedure Note       Name:  Nhan Cortez   Age:  73 y.o.  :  1950                                          MRN:  [id number]         Date:  2024      Surgeon: Surgeon(s):  Brendon Urban MD    Procedure: Procedure(s):  ROBOT WF W/ANES. (12:30) NO LABS  EBUS WF W/ANES.    Medications prior to admission:   Prior to Admission medications    Medication Sig Start Date End Date Taking? Authorizing Provider   aspirin 81 MG EC tablet Take 1 tablet by mouth daily 23   Shira Zarate MD   insulin glargine (LANTUS) 100 UNIT/ML injection vial Inject 38 Units into the skin every morning 23   Shira Zarate MD   atorvastatin (LIPITOR) 80 MG tablet Take 1 tablet by mouth at bedtime 23   Shira Zarate MD   memantine (NAMENDA) 5 MG tablet Take 1 tablet by mouth 2 times daily 23   Shira Zarate MD   carvedilol (COREG) 6.25 MG tablet Take 1 tablet by mouth 2 times daily 10/10/23   Avni Cochran MD   acetaminophen (TYLENOL) 500 MG tablet Take 2 tablets by mouth every 6 hours as needed for Pain    Avni Cochran MD   lidocaine-prilocaine (EMLA) 2.5-2.5 % cream Apply 1 application  topically as needed for Pain Apply topically as needed.    Avni Cochran MD   empagliflozin (JARDIANCE) 25 MG tablet Take 1 tablet by mouth daily    Avni Cochran MD   glipiZIDE (GLUCOTROL) 5 MG tablet Take 1 tablet by mouth daily    Avni Cochran MD   metFORMIN (GLUCOPHAGE) 500 MG tablet Take 1 tablet by mouth 2 times daily (with meals)    Avni Cochran MD   pregabalin (LYRICA) 100 MG capsule Take 2 capsules by mouth 2 times daily. 3/18/10   Avni Cochran MD       Current medications:    Current Facility-Administered Medications   Medication Dose Route Frequency Provider Last Rate Last Admin   • lactated ringers IV soln infusion   IntraVENous Continuous Fabio Dixon MD       • sodium chloride flush 0.9 %

## 2024-02-02 NOTE — PROGRESS NOTES
Phase I (PACU)  1.  Patient is identified using name and the date of birth.  2.  The patient is free from signs and symptoms of chemical, electrical, laser, radiation, positioning, or transfer/transport injury.  3.  The patient receives appropriate medication(s), safely administered during the Perioperative period.  4.  The patient has wound/tissue perfusion consistent with or improved from baseline levels established preoperatively.  5.  The patient is at or returning to normothermia at the conclusion of the immediate postoperative period.  6.  The patient's fluid, electrolyte, and acid base balances are consistent with or improved from baseline levels established preoperatively.  7.  The patient's pulmonary function is consistent with or improved from baseline levels established preoperatively.  8.  The patient's cardiovascular status is consistent with or improved from baseline levels established preoperatively.  9.  The patient/caregiver participates in decisions affecting his or her Perioperative plan of care.  10.  The patient's care is consistent with the individualized Perioperative plan of care.  11.  The patient's right to privacy is maintained.  12.  The patient is the recipient of competent and ethical care within legal standards of practice.  13.  The patient's value system, lifestyle, ethnicity, and culture are considered, respected, and incorporated in the Perioperative plan of care.  14.  The patient demonstrates and/or reports adequate pain control throughout the the Perioperative period.  15.  The patient's neurological status is consistent with or improved from baseline levels established preoperatively.  16.  The patient/caregiver demonstrates knowledge of the expected responses to the operative or invasive procedure.  17.  Patient/Caregiver has reduced anxiety.  Interventions- Familiarize with environment and equipment.  18.  Other:  19.Other:

## 2024-02-02 NOTE — PROGRESS NOTES
VSS as on admit, discharge instructions reviewed, chest x-ray back, no pneumothorax, Dr. Urban called and okay to discharge.  Patient requests to have lab work drawn from paper order. Patient taken to registration via  by Reina MARSHALL, wife is moving car to front and to meet him up front.

## 2024-02-02 NOTE — DISCHARGE INSTRUCTIONS
Please follow up with pulmonologist as needed.   The pulmonology office will call regarding any biopsy result.   Please call the office with any additional questions, [phone]           Bronchoscopy: What to Expect at Home  Your Recovery     Bronchoscopy lets your doctor look at your airway through a tube called a bronchoscope. Afterward, you may feel tired for 1 or 2 days. Your mouth may feel very dry for several hours after the procedure. You may also have a sore throat and a hoarse voice for a few days. Sucking on throat lozenges or gargling with warm salt water may help soothe your sore throat.  If a sample of tissue (biopsy) was taken, you may spit up a small amount of blood or have bloody saliva. This is normal.  Do not drive or smoke for 24 hours.  This care sheet gives you a general idea about how long it will take for you to recover. But each person recovers at a different pace. Follow the steps below to get better as quickly as possible.  How can you care for yourself at home?  Activity    Do not eat anything for 2 hours after the procedure.     Rest when you feel tired. Getting enough sleep will help you recover.     Avoid strenuous activities, such as bicycle riding, jogging, weight lifting, or aerobic exercise, until your doctor says it is okay.     No driving for 24hrs.    Diet    You can eat your normal diet. If your stomach is upset, try bland, low-fat foods like plain rice, broiled chicken, toast, and yogurt.     If it is painful to swallow, start out with cold drinks, flavored ice pops, and ice cream. Next, try soft foods like pudding, yogurt, canned or cooked fruit, scrambled eggs, and mashed potatoes. Avoid eating hard or scratchy foods like chips or raw vegetables. Avoid orange or tomato juice and other acidic foods that can sting the throat.     Drink plenty of fluids to avoid becoming dehydrated (unless your doctor tells you not to).   Medicines    Take pain medicines exactly as  history or diagnosis of sleep apnea:  For all sleep apnea patients:  ? Sleep on your side or sitting up in a chair whenever possible, especially the first 24 hours after surgery.  ? Use only medicines prescribed by your doctor.    ? Do not drink alcohol.  ? If you have a dental device to assist you while at rest, use it at all times for the first 24 hours.  For patients using CPAP machines:  ? Use your CPAP machine during all periods of sleep as usual.  ? Use your CPAP machine during all periods of daytime rest while on pain medicines.  ** Follow up with your primary care doctor for continued care.    IF YOU DO NOT TAKE ALL OF YOUR NARCOTIC PAIN MEDICATION, please dispose of them responsibly. There are drop off boxes in the Emergency Departments 24/7 at both [facility] and [facility]. If these locations are not convenient, other options for discarding them can be found at:  [email or web address]    Hospital or office staff may NOT accept any medications to drop off in the cabinet for you.

## 2024-02-02 NOTE — PROGRESS NOTES
Pre-Operative:  1.  Patient/Caregiver identifies - states name and date of birth.  2.  The patient is free from signs and symptoms of injury.  3.  The patient receives appropriate medication(s), safely administered during the Perioperative period.  4.  The patients's fluid, electrolyte, and acid-base balances are established preoperatively.  5.  The patient's pulmonary function is established preoperatively.  6.  The patient's cardiovascular status is established preoperatively.  7.  The patient / caregiver demonstrates knowledge of nutritional management related to the operative or other invasive procedure.  8.  The patient/caregiver demonstrates knowledge of medication management.  9.  The patient/caregiver demonstrates knowledge of pain management.  10.  The patient/caregiver participates in decisions affection his or her Perioperative plan of care.  11.  The patient's care is consistent with the individualized Perioperative plan of care.  12.  The patient's right to privacy is maintained.  13.  The patient is the recipient of competent and ethical care within legal standards of practice.  14.  The patient's value system, lifestyle, ethnicity, and culture are considered, respected, and incorporated in the Perioperative plan of care and understands special services available.  15.  The patient demonstrates and/or reports adequate pain control throughout the the Perioperative period.  16.  The patient's neurological status is established preoperatively.  17.  The patient/caregiver demonstrates knowledge of the expected responses to the endoscopy procedure.  18.  Patient/Caregiver has reduced anxiety.  Interventions- Familiarize with environment and equipment.  19. Patient/Caregiver verbalizes understanding of Phase II and/or Phase I process.  20.  Patient pain level is established preoperatively using age appropriate pain scale.  21.  The patient will move to fall risk upon sedation- during and through the recovery  phase.  Interventions- orient the patient to the environment, especially the location of the bathroom; provide treaded socks/non-skid footwear; demonstrate and teach back use of the nurse's call system; instruct the patient to call for help before getting out of bed; lock all movable equipment before transferring patient; keep bed in lowest position possible.

## 2024-02-02 NOTE — ANESTHESIA POSTPROCEDURE EVALUATION
Department of Anesthesiology  Postprocedure Note    Patient: Nhan Cortez  MRN: [id number]  YOB: 1950  Date of evaluation: 2/2/2024    Procedure Summary       Date: 02/02/24 Room / Location: [place] [address] / [facility]    Anesthesia Start: 1317 Anesthesia Stop: 1513    Procedures:       BRONCHOSCOPY W/EBUS FNA ROBOTIC      EBUS WF W/ANES.      BRONCHOSCOPY ALVEOLAR LAVAGE      BRONCHOSCOPY ALVEOLAR LAVAGE ROBOTIC      BRONCHOSCOPY BRUSHINGS ROBOTIC Diagnosis:       Pulmonary nodule      (Pulmonary nodule [R91.1])    Surgeons: Brendon Urban MD Responsible Provider: Fransico Turner MD    Anesthesia Type: general ASA Status: 4            Anesthesia Type: No value filed.    Christiano Phase I: Christiano Score: 10    Christiano Phase II: Christiano Score: 10    Anesthesia Post Evaluation    Patient location during evaluation: PACU  Patient participation: complete - patient participated  Level of consciousness: awake and alert  Airway patency: patent  Nausea & Vomiting: no nausea and no vomiting  Cardiovascular status: blood pressure returned to baseline  Respiratory status: acceptable  Hydration status: euvolemic  Comments: VSS on transfer to phase 2 recovery.  No anesthetic complications.  Pain management: adequate    No notable events documented.

## 2024-02-03 LAB — LOEFFLER MB STN SPEC: NORMAL

## 2024-02-04 LAB
ACID FAST STN SPEC QL: NORMAL
BACTERIA SPEC RESP CULT: NORMAL
GRAM STN SPEC: NORMAL

## 2024-02-05 LAB
B DERMAT AB SER-ACNC: 0.1 IV
GAMMA INTERFERON BACKGROUND BLD IA-ACNC: 0.02 IU/ML
MITOGEN IGNF BCKGRD COR BLD-ACNC: 7.8 IU/ML
QUANTI TB GOLD PLUS: NEGATIVE
QUANTI TB1 MINUS NIL: 0 IU/ML (ref 0–0.34)
QUANTI TB2 MINUS NIL: 0 IU/ML (ref 0–0.34)

## 2024-02-06 DIAGNOSIS — C34.90 MALIGNANT NEOPLASM OF LUNG, UNSPECIFIED LATERALITY, UNSPECIFIED PART OF LUNG (HCC): Primary | ICD-10-CM

## 2024-02-06 NOTE — H&P
P  [facility]                                               Pulmonary Clinic Consult     I had the pleasure of seeing  Nhan Cortez     No chief complaint on file.      HISTORY OF PRESENT ILLNESS:    Nhan Cortez is a 73 y.o. year old  Who never smoke but second hand smoking    h/o CVA and DM2 presented for a possible TIA.. A CTA neck showed a left pulmonary nodule and I was asked to se the patient and give recommendations.   The patient denies  hemoptysis or shortness of breath chest pain or shortness of breath or cough or weight loss.  He is a never smoker.  He has no history of tuberculosis.  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  His sister was a heavy smoker and  of lung cancer.                 ALLERGIES:    Allergies   Allergen Reactions    Lisinopril      \"Makes me feel goofy\"    Duloxetine Other (See Comments)     2017 15:13 UTC PHILIPPE THEODORE<br/><br/>Reports sleepiness with initiation at 30mg x 1wk and<br/>worsened with incr to 60mg/day x 2 days. Then stoppped;<br/>symptoms resolved.    Nortriptyline Other (See Comments)     2015 12:49 UTPHILIPPE LACEY<br/><br/>Pt reported significant morning 'grogginess'. See Opioid<br/>Management Pharm Eval note dated 2/25/15 for add'l info.    Venlafaxine Nausea And Vomiting       PAST MEDICAL HISTORY:       Diagnosis Date    Carotid artery occlusion 2010    60-69% left (neg stress test 5/10)    Cerebral artery occlusion with cerebral infarction (HCC)     Cerebrovascular disease 2010    ?TIA    Chronic back pain      Dr Hardy - pain management    COVID-19 2020    Gout     Hyperlipidemia     Hypertension     Routine health maintenance     Type II or unspecified type diabetes mellitus without mention of complication, not stated as uncontrolled     eye exam        MEDICATIONS:   No current facility-administered medications for this

## 2024-02-06 NOTE — TELEPHONE ENCOUNTER
Cyto, resp cx, blastomyces ab, quantiferon and cell count complete.     Fungus cx, acid fast, histoplasma still pending.       Pt needs sched for pft w/ bronch 6mwt.

## 2024-02-06 NOTE — OP NOTE
Operative Note      Patient: Nhan Cortez  YOB: 1950  MRN: [id number]    Date of Procedure: 2/2/2024    Pre-Op Diagnosis Codes:     * Pulmonary nodule [R91.1]    Post-Op Diagnosis: Same       Procedure(s):  EBUS WF W/ANES.  BRONCHOSCOPY ALVEOLAR LAVAGE  BRONCHOSCOPY ALVEOLAR LAVAGE ROBOTIC  BRONCHOSCOPY BRUSHINGS ROBOTIC  BRONCHOSCOPY/TRANSBRONCHIAL NEEDLE BIOPSY ROBOTIC  BRONCHOSCOPY W/EBUS FNA ROBOTIC    Surgeon(s):  Brendon Urban MD    Assistant:   * No surgical staff found *    Anesthesia: General    Estimated Blood Loss (mL): Minimal    PROCEDURE: Fiberoptic bronchoscopy with endobronchial ultrasound-guided biopsy of lymph nodes and robotic bronchoscopy-guided PIPO  TBBx, brushes and BAL.     DESCRIPTION OF PROCEDURE: Informed consent was obtained from the patient after explaining the risks and benefits. A time out was taken. Total intravenous anesthesia was kindly provided by the anesthetist.     The scope was passed with ease via size ET tube. Direct visualization of the lymph nodes was obtained using endobronchial ultrasound (EBUS) guidance. A complete ultrasound lymph node exam was performed for lymph node stations 4, 7, 10 and 11.  The following lymph nodes were subjected to EBUS guided biopsy using standard technique and in the following order:  All were less than 5 mm ,no biopsy roberto   Subsequently, a standard bronchoscope was used to perform a complete airway inspection. Patent airways with no evidence of endobronchial lesion. Scattered clear secretion with normal mucosa.     Robotic bronchoscopy protocol CT chest obtained and preoperative planning was performed creating pathways to the PIPO  lesion. I then introduced my robotic bronchoscopy/EMN scope through the ETT and successfully completed registration, with good correlation between robotic mapping and bronchoscopic imaging. With the assistance of fused navigation, I advanced my scope all the way to theLUL  mass. The tip of the

## 2024-02-07 ENCOUNTER — TELEPHONE (OUTPATIENT)
Dept: PULMONOLOGY | Age: 74
End: 2024-02-07

## 2024-02-07 LAB — H CAPSUL AB TITR SER ID: NOT DETECTED {TITER}

## 2024-02-07 NOTE — TELEPHONE ENCOUNTER
Pt called and is requesting a c/b regarding any upcoming testing that may be required. Please advise.

## 2024-02-08 ENCOUNTER — OFFICE VISIT (OUTPATIENT)
Age: 74
End: 2024-02-08
Payer: MEDICARE

## 2024-02-08 ENCOUNTER — HOSPITAL ENCOUNTER (OUTPATIENT)
Age: 74
Discharge: HOME | End: 2024-02-08
Payer: MEDICARE

## 2024-02-08 ENCOUNTER — TELEPHONE (OUTPATIENT)
Age: 74
End: 2024-02-08

## 2024-02-08 VITALS
WEIGHT: 181 LBS | HEIGHT: 67 IN | OXYGEN SATURATION: 99 % | DIASTOLIC BLOOD PRESSURE: 72 MMHG | HEART RATE: 69 BPM | BODY MASS INDEX: 28.41 KG/M2 | SYSTOLIC BLOOD PRESSURE: 112 MMHG

## 2024-02-08 DIAGNOSIS — R93.0 ABNORMAL MRI OF THE HEAD: ICD-10-CM

## 2024-02-08 DIAGNOSIS — E53.8 B12 DEFICIENCY: ICD-10-CM

## 2024-02-08 DIAGNOSIS — E55.9 VITAMIN D DEFICIENCY: ICD-10-CM

## 2024-02-08 DIAGNOSIS — R42 VERTIGO: Primary | ICD-10-CM

## 2024-02-08 DIAGNOSIS — R42 VERTIGO: ICD-10-CM

## 2024-02-08 DIAGNOSIS — E61.1 IRON DEFICIENCY: ICD-10-CM

## 2024-02-08 LAB
25(OH)D3 SERPL-MCNC: 17.3 NG/ML
FERRITIN SERPL IA-MCNC: 240.3 NG/ML (ref 30–400)
FOLATE SERPL-MCNC: 9.67 NG/ML (ref 4.78–24.2)
IRON SATN MFR SERPL: 26 % (ref 20–50)
IRON SERPL-MCNC: 80 UG/DL (ref 59–158)
TIBC SERPL-MCNC: 303 UG/DL (ref 260–445)
VIT B12 SERPL-MCNC: 366 PG/ML (ref 211–911)

## 2024-02-08 PROCEDURE — G8427 DOCREV CUR MEDS BY ELIG CLIN: HCPCS | Performed by: PSYCHIATRY & NEUROLOGY

## 2024-02-08 PROCEDURE — G8419 CALC BMI OUT NRM PARAM NOF/U: HCPCS | Performed by: PSYCHIATRY & NEUROLOGY

## 2024-02-08 PROCEDURE — G8484 FLU IMMUNIZE NO ADMIN: HCPCS | Performed by: PSYCHIATRY & NEUROLOGY

## 2024-02-08 PROCEDURE — 1036F TOBACCO NON-USER: CPT | Performed by: PSYCHIATRY & NEUROLOGY

## 2024-02-08 PROCEDURE — 83550 IRON BINDING TEST: CPT

## 2024-02-08 PROCEDURE — 3074F SYST BP LT 130 MM HG: CPT | Performed by: PSYCHIATRY & NEUROLOGY

## 2024-02-08 PROCEDURE — 82728 ASSAY OF FERRITIN: CPT

## 2024-02-08 PROCEDURE — 1123F ACP DISCUSS/DSCN MKR DOCD: CPT | Performed by: PSYCHIATRY & NEUROLOGY

## 2024-02-08 PROCEDURE — 82607 VITAMIN B-12: CPT

## 2024-02-08 PROCEDURE — 3017F COLORECTAL CA SCREEN DOC REV: CPT | Performed by: PSYCHIATRY & NEUROLOGY

## 2024-02-08 PROCEDURE — 82746 ASSAY OF FOLIC ACID SERUM: CPT

## 2024-02-08 PROCEDURE — 36415 COLL VENOUS BLD VENIPUNCTURE: CPT

## 2024-02-08 PROCEDURE — 83540 ASSAY OF IRON: CPT

## 2024-02-08 PROCEDURE — 99214 OFFICE O/P EST MOD 30 MIN: CPT | Performed by: PSYCHIATRY & NEUROLOGY

## 2024-02-08 PROCEDURE — 82306 VITAMIN D 25 HYDROXY: CPT

## 2024-02-08 PROCEDURE — 3078F DIAST BP <80 MM HG: CPT | Performed by: PSYCHIATRY & NEUROLOGY

## 2024-02-08 RX ORDER — LIDOCAINE 50 MG/G
OINTMENT TOPICAL
COMMUNITY
Start: 2023-05-30

## 2024-02-08 NOTE — PROGRESS NOTES
Neurology outpatient follow-up visit    Patient name: Nhan Cortez      Chief Complaint:  Central vertigo.    History of present illness:  This is a 73 years old right-handed male.  The patient was brought to the ER yesterday due to worsening of vestibular syndrome.  The patient has had refractory unsteadiness and lightheadedness for 3 months.  The patient denies significant spinning sensation.  It is getting worse with changing position, especially in the morning.  It can last up to 5 to 10 minutes.  It can occur many times a day.  Yesterday, the patient developed prolonged episode without spontaneous improvement.  Upon admission, the patient is noted for elevated blood pressure.  The patient denies focal weakness or numbness.  The patient also denies significant headache.  The patient is noted for underlying peripheral neuropathy on both legs.  The patient has had uncontrolled diabetes for many years.    The patient remains to have evidence of ataxia, especially sensory ataxia.  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  CT brain showed no significant ischemic stroke or intracerebral hemorrhage.  MRI brain showed no acute infarction but chronic white matter changes and significant brain atrophy.  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  At that time, the MRI brain showed no acute infarction but significant chronic white matter changes and brain atrophy.  The patient was discharged with memantine.  However, the patient denies significant benefit from memantine.  The patient also had history of B12 deficiency and iron deficiency.  The last checkup was back in 2020 per our record.  The patient was also found newly diagnosed lung tumor recently.    Past medical history:    Past Medical History:   Diagnosis Date    Carotid artery

## 2024-02-09 NOTE — TELEPHONE ENCOUNTER
Spoke with pt - he was not able to write down the supplement dosages so I sent him an email letting him now how much vitamin D and B12 to take daily.  Also asked pt to call the office back to make 2 mo f/u when he's able to do so.

## 2024-02-09 NOTE — TELEPHONE ENCOUNTER
Will suggest to start Vit D supplement 1000 IU daily and B12 supplement 1000 mcg daily.    F/U in 2 months.

## 2024-02-12 LAB
FUNGUS SPEC CULT: NORMAL
LOEFFLER MB STN SPEC: NORMAL

## 2024-02-13 LAB
ACID FAST STN SPEC QL: NORMAL
MYCOBACTERIUM SPEC CULT: NORMAL

## 2024-02-19 LAB
FUNGUS SPEC CULT: NORMAL
LOEFFLER MB STN SPEC: NORMAL

## 2024-02-20 LAB
ACID FAST STN SPEC QL: NORMAL
MYCOBACTERIUM SPEC CULT: NORMAL

## 2024-02-20 NOTE — ANESTHESIA POSTPROCEDURE EVALUATION
Department of Anesthesiology  Postprocedure Note    Patient: Alfredo Kim  MRN: [id number]  YOB: 1950  Date of evaluation: 8/27/2018  Time:  1:27 PM     Procedure Summary     Date:  08/27/18 Room / Location:  [place] [address] (HYBRID) / [facility]    Anesthesia Start:  Ameena Giulia Anesthesia Stop:  9035    Procedure:  LEFT CAROTID ENDARTERECTOMY (Left ) Diagnosis:       Carotid stenosis, left      (LEFT CAROTID ARTERY STENOSIS)    Surgeon: Kiarra Bowen MD Responsible Provider:  Kelechi Haddad MD    Anesthesia Type:  general ASA Status:  3          Anesthesia Type: general    Christiano Phase I: Christiano Score: 9    Christiano Phase II:      Last vitals: Reviewed and per EMR flowsheets.        Anesthesia Post Evaluation    Comments: Postoperative Anesthesia Note    Name:    Alfredo Kim  MRN:      [id number]    Patient Vitals in the past 12 hrs:  08/27/18 1249, BP:123/76, Pulse:76  08/27/18 1227, SpO2:95 %, Height:5' 7\" (1.702 m)  08/27/18 1130, BP:117/68, Temp:98.2 °F (36.8 °C), Temp src:Oral, Pulse:78, Resp:11, SpO2:97 %  08/27/18 1110, BP:122/65, Pulse:76, Resp:11, SpO2:96 %  08/27/18 1100, BP:122/63, Pulse:77, Resp:12, SpO2:96 %  08/27/18 1055, BP:129/70, Pulse:79, Resp:11, SpO2:94 %  08/27/18 1050, BP:113/88, Pulse:81, Resp:13, SpO2:94 %  08/27/18 1045, BP:128/70, Pulse:81, Resp:9, SpO2:95 %  08/27/18 1040, BP:124/67, Pulse:79, Resp:13, SpO2:94 %  08/27/18 1035, BP:120/69, Pulse:79, Resp:13, SpO2:95 %  08/27/18 1030, BP:120/68, Pulse:80, Resp:11, SpO2:94 %  08/27/18 1025, BP:127/65, Pulse:80, Resp:11, SpO2:96 %  08/27/18 1019, Temp:98.2 °F (36.8 °C), Temp src:Temporal  08/27/18 1018, BP:126/71, Pulse:82, Resp:12, SpO2:95 %  08/27/18 1015, BP:123/67, Pulse:82, Resp:(!) 7, SpO2:96 %  08/27/18 1005, BP:126/69, Pulse:82, Resp:9, SpO2:95 %  08/27/18 1000, BP:132/62, Pulse:83, Resp:9, SpO2:94 %  08/27/18 0955, BP:126/68, Pulse:83, Resp:12, SpO2:93 %  08/27/18 0950, BP:125/71, Pulse:83, Resp:11, SpO2:96 %  08/27/18 0945, Complications:  No apparent anesthetic complications    SUMMARY      Vital signs stable  OK to discharge from Stage I post anesthesia care.   Care transferred from Anesthesiology department on discharge from perioperative area 36.4

## 2024-02-23 ENCOUNTER — APPOINTMENT (OUTPATIENT)
Dept: CT IMAGING | Age: 74
End: 2024-02-23
Payer: OTHER GOVERNMENT

## 2024-02-23 ENCOUNTER — HOSPITAL ENCOUNTER (EMERGENCY)
Age: 74
Discharge: HOME | End: 2024-02-24
Attending: EMERGENCY MEDICINE
Payer: OTHER GOVERNMENT

## 2024-02-23 ENCOUNTER — APPOINTMENT (OUTPATIENT)
Dept: GENERAL RADIOLOGY | Age: 74
End: 2024-02-23
Payer: OTHER GOVERNMENT

## 2024-02-23 DIAGNOSIS — N39.0 URINARY TRACT INFECTION IN MALE: ICD-10-CM

## 2024-02-23 DIAGNOSIS — U07.1 COVID-19: Primary | ICD-10-CM

## 2024-02-23 PROCEDURE — 87804 INFLUENZA ASSAY W/OPTIC: CPT

## 2024-02-23 PROCEDURE — 80307 DRUG TEST PRSMV CHEM ANLYZR: CPT

## 2024-02-23 PROCEDURE — 83605 ASSAY OF LACTIC ACID: CPT

## 2024-02-23 PROCEDURE — 99285 EMERGENCY DEPT VISIT HI MDM: CPT

## 2024-02-23 PROCEDURE — 84443 ASSAY THYROID STIM HORMONE: CPT

## 2024-02-23 PROCEDURE — 70450 CT HEAD/BRAIN W/O DYE: CPT

## 2024-02-23 PROCEDURE — 93005 ELECTROCARDIOGRAM TRACING: CPT | Performed by: EMERGENCY MEDICINE

## 2024-02-23 PROCEDURE — 71045 X-RAY EXAM CHEST 1 VIEW: CPT

## 2024-02-23 PROCEDURE — 87635 SARS-COV-2 COVID-19 AMP PRB: CPT

## 2024-02-23 PROCEDURE — 80053 COMPREHEN METABOLIC PANEL: CPT

## 2024-02-23 PROCEDURE — 84484 ASSAY OF TROPONIN QUANT: CPT

## 2024-02-23 PROCEDURE — 85025 COMPLETE CBC W/AUTO DIFF WBC: CPT

## 2024-02-23 PROCEDURE — 51798 US URINE CAPACITY MEASURE: CPT

## 2024-02-23 PROCEDURE — 96365 THER/PROPH/DIAG IV INF INIT: CPT

## 2024-02-23 PROCEDURE — 81001 URINALYSIS AUTO W/SCOPE: CPT

## 2024-02-23 PROCEDURE — 82077 ASSAY SPEC XCP UR&BREATH IA: CPT

## 2024-02-23 PROCEDURE — 83880 ASSAY OF NATRIURETIC PEPTIDE: CPT

## 2024-02-23 PROCEDURE — 36415 COLL VENOUS BLD VENIPUNCTURE: CPT

## 2024-02-23 RX ORDER — 0.9 % SODIUM CHLORIDE 0.9 %
1000 INTRAVENOUS SOLUTION INTRAVENOUS ONCE
Status: COMPLETED | OUTPATIENT
Start: 2024-02-24 | End: 2024-02-24

## 2024-02-23 RX ORDER — ACETAMINOPHEN 325 MG/1
650 TABLET ORAL ONCE
Status: COMPLETED | OUTPATIENT
Start: 2024-02-24 | End: 2024-02-24

## 2024-02-23 ASSESSMENT — LIFESTYLE VARIABLES
HOW OFTEN DO YOU HAVE A DRINK CONTAINING ALCOHOL: NEVER
HOW MANY STANDARD DRINKS CONTAINING ALCOHOL DO YOU HAVE ON A TYPICAL DAY: PATIENT DOES NOT DRINK

## 2024-02-24 VITALS
HEIGHT: 67 IN | BODY MASS INDEX: 29.03 KG/M2 | RESPIRATION RATE: 16 BRPM | OXYGEN SATURATION: 95 % | TEMPERATURE: 99.1 F | HEART RATE: 76 BPM | WEIGHT: 185 LBS | DIASTOLIC BLOOD PRESSURE: 87 MMHG | SYSTOLIC BLOOD PRESSURE: 131 MMHG

## 2024-02-24 LAB
ALBUMIN SERPL-MCNC: 4.3 G/DL (ref 3.4–5)
ALBUMIN/GLOB SERPL: 1.3 {RATIO} (ref 1.1–2.2)
ALP SERPL-CCNC: 175 U/L (ref 40–129)
ALT SERPL-CCNC: 16 U/L (ref 10–40)
AMPHETAMINES UR QL SCN>1000 NG/ML: NORMAL
ANION GAP SERPL CALCULATED.3IONS-SCNC: 14 MMOL/L (ref 3–16)
AST SERPL-CCNC: 22 U/L (ref 15–37)
BACTERIA URNS QL MICRO: ABNORMAL /HPF
BARBITURATES UR QL SCN>200 NG/ML: NORMAL
BASOPHILS # BLD: 0 K/UL (ref 0–0.2)
BASOPHILS NFR BLD: 0.3 %
BENZODIAZ UR QL SCN>200 NG/ML: NORMAL
BILIRUB SERPL-MCNC: 0.6 MG/DL (ref 0–1)
BILIRUB UR QL STRIP.AUTO: NEGATIVE
BUN SERPL-MCNC: 26 MG/DL (ref 7–20)
CALCIUM SERPL-MCNC: 9.6 MG/DL (ref 8.3–10.6)
CANNABINOIDS UR QL SCN>50 NG/ML: NORMAL
CHLORIDE SERPL-SCNC: 102 MMOL/L (ref 99–110)
CLARITY UR: CLEAR
CO2 SERPL-SCNC: 22 MMOL/L (ref 21–32)
COCAINE UR QL SCN: NORMAL
COLOR UR: YELLOW
CREAT SERPL-MCNC: 1.6 MG/DL (ref 0.8–1.3)
DEPRECATED RDW RBC AUTO: 14.2 % (ref 12.4–15.4)
DRUG SCREEN COMMENT UR-IMP: NORMAL
EKG ATRIAL RATE: 85 BPM
EKG DIAGNOSIS: NORMAL
EKG P AXIS: 65 DEGREES
EKG P-R INTERVAL: 158 MS
EKG Q-T INTERVAL: 364 MS
EKG QRS DURATION: 82 MS
EKG QTC CALCULATION (BAZETT): 433 MS
EKG R AXIS: -25 DEGREES
EKG T AXIS: 85 DEGREES
EKG VENTRICULAR RATE: 85 BPM
EOSINOPHIL # BLD: 0 K/UL (ref 0–0.6)
EOSINOPHIL NFR BLD: 1.9 %
ETHANOLAMINE SERPL-MCNC: NORMAL MG/DL (ref 0–0.08)
FENTANYL SCREEN, URINE: NORMAL
FLUAV RNA UPPER RESP QL NAA+PROBE: NEGATIVE
FLUBV AG NPH QL: NEGATIVE
GFR SERPLBLD CREATININE-BSD FMLA CKD-EPI: 45 ML/MIN/{1.73_M2}
GLUCOSE SERPL-MCNC: 247 MG/DL (ref 70–99)
GLUCOSE UR STRIP.AUTO-MCNC: >=1000 MG/DL
HCT VFR BLD AUTO: 36.7 % (ref 40.5–52.5)
HGB BLD-MCNC: 12.4 G/DL (ref 13.5–17.5)
HGB UR QL STRIP.AUTO: NEGATIVE
KETONES UR STRIP.AUTO-MCNC: NEGATIVE MG/DL
LACTATE BLDV-SCNC: 1 MMOL/L (ref 0.4–1.9)
LACTATE BLDV-SCNC: 2.2 MMOL/L (ref 0.4–2)
LEUKOCYTE ESTERASE UR QL STRIP.AUTO: NEGATIVE
LYMPHOCYTES # BLD: 0.4 K/UL (ref 1–5.1)
LYMPHOCYTES NFR BLD: 21 %
MCH RBC QN AUTO: 32.7 PG (ref 26–34)
MCHC RBC AUTO-ENTMCNC: 33.8 G/DL (ref 31–36)
MCV RBC AUTO: 97 FL (ref 80–100)
METHADONE UR QL SCN>300 NG/ML: NORMAL
MONOCYTES # BLD: 0.1 K/UL (ref 0–1.3)
MONOCYTES NFR BLD: 2.8 %
NEUTROPHILS # BLD: 1.4 K/UL (ref 1.7–7.7)
NEUTROPHILS NFR BLD: 74 %
NITRITE UR QL STRIP.AUTO: NEGATIVE
NT-PROBNP SERPL-MCNC: 227 PG/ML (ref 0–449)
OPIATES UR QL SCN>300 NG/ML: NORMAL
OXYCODONE UR QL SCN: NORMAL
PATH INTERP BLD-IMP: YES
PCP UR QL SCN>25 NG/ML: NORMAL
PH UR STRIP.AUTO: 7 [PH] (ref 5–8)
PH UR STRIP: 7 [PH]
PLATELET # BLD AUTO: 98 K/UL (ref 135–450)
PLATELET BLD QL SMEAR: ABNORMAL
PMV BLD AUTO: 7.3 FL (ref 5–10.5)
POTASSIUM SERPL-SCNC: 5 MMOL/L (ref 3.5–5.1)
PROT SERPL-MCNC: 7.7 G/DL (ref 6.4–8.2)
PROT UR STRIP.AUTO-MCNC: 30 MG/DL
RBC # BLD AUTO: 3.78 M/UL (ref 4.2–5.9)
RBC #/AREA URNS HPF: ABNORMAL /HPF (ref 0–4)
SARS-COV-2 RDRP RESP QL NAA+PROBE: DETECTED
SLIDE REVIEW: ABNORMAL
SODIUM SERPL-SCNC: 138 MMOL/L (ref 136–145)
SP GR UR STRIP.AUTO: 1.01 (ref 1–1.03)
TROPONIN, HIGH SENSITIVITY: 21 NG/L (ref 0–22)
TSH SERPL DL<=0.005 MIU/L-ACNC: 0.47 UIU/ML (ref 0.27–4.2)
UA COMPLETE W REFLEX CULTURE PNL UR: ABNORMAL
UA DIPSTICK W REFLEX MICRO PNL UR: YES
URN SPEC COLLECT METH UR: ABNORMAL
UROBILINOGEN UR STRIP-ACNC: 0.2 E.U./DL
WBC # BLD AUTO: 1.9 K/UL (ref 4–11)
WBC #/AREA URNS HPF: ABNORMAL /HPF (ref 0–5)

## 2024-02-24 PROCEDURE — 36415 COLL VENOUS BLD VENIPUNCTURE: CPT

## 2024-02-24 PROCEDURE — 83605 ASSAY OF LACTIC ACID: CPT

## 2024-02-24 PROCEDURE — 6370000000 HC RX 637 (ALT 250 FOR IP): Performed by: EMERGENCY MEDICINE

## 2024-02-24 PROCEDURE — 87040 BLOOD CULTURE FOR BACTERIA: CPT

## 2024-02-24 PROCEDURE — 2580000003 HC RX 258: Performed by: EMERGENCY MEDICINE

## 2024-02-24 PROCEDURE — 93010 ELECTROCARDIOGRAM REPORT: CPT | Performed by: INTERNAL MEDICINE

## 2024-02-24 PROCEDURE — 6360000002 HC RX W HCPCS: Performed by: EMERGENCY MEDICINE

## 2024-02-24 RX ORDER — CEPHALEXIN 500 MG/1
500 CAPSULE ORAL 2 TIMES DAILY
Qty: 14 CAPSULE | Refills: 0 | Status: SHIPPED | OUTPATIENT
Start: 2024-02-24 | End: 2024-03-02

## 2024-02-24 RX ORDER — NIRMATRELVIR AND RITONAVIR 150-100 MG
KIT ORAL
Qty: 20 TABLET | Refills: 0 | Status: SHIPPED | OUTPATIENT
Start: 2024-02-24 | End: 2024-02-29

## 2024-02-24 RX ORDER — ACETAMINOPHEN 325 MG/1
650 TABLET ORAL EVERY 4 HOURS PRN
Qty: 120 TABLET | Refills: 3 | Status: SHIPPED | OUTPATIENT
Start: 2024-02-24

## 2024-02-24 RX ADMIN — ACETAMINOPHEN 650 MG: 325 TABLET ORAL at 00:24

## 2024-02-24 RX ADMIN — SODIUM CHLORIDE 1000 ML: 9 INJECTION, SOLUTION INTRAVENOUS at 00:23

## 2024-02-24 RX ADMIN — CEFTRIAXONE SODIUM 1000 MG: 1 INJECTION, POWDER, FOR SOLUTION INTRAMUSCULAR; INTRAVENOUS at 01:09

## 2024-02-24 NOTE — ED PROVIDER NOTES
[facility] EMERGENCY DEPARTMENT  EMERGENCY DEPARTMENT ENCOUNTER      Pt Name: Nhan Cortez  MRN: [id number]  Birthdate 1950  Date of evaluation: 2/23/2024  Provider: Aleksander Tillman MD    CHIEF COMPLAINT       Chief Complaint   Patient presents with    Urinary Frequency     Patient states he urinated himself twice at home. Patient states he usually walks but can't walk today and feels weaker than normal          HISTORY OF PRESENT ILLNESS   (Location/Symptom, Timing/Onset, Context/Setting, Quality, Duration, Modifying Factors, Severity)  Note limiting factors.   Nhan Cortez is a 73 y.o. male who presents to the emergency department with the chief complaint of   Chief Complaint   Patient presents with    Urinary Frequency     Patient states he urinated himself twice at home. Patient states he usually walks but can't walk today and feels weaker than normal    . 73-year-old male with past medical history significant for diabetes, high cholesterol, neuropathy, dementia, hypertension, vertigo, history of chronic pain, gout, CVA with unknown deficit, history of lung cancer presents to the ER from home for evaluation of increased confusion, difficulties walking and urinary incontinence x 2.  Patient arrives here, alert and oriented x 3, grossly neurologically intact.  Patient states he has intermittent shortness of breath and chest pain that has not changed in severity.  Patient denies abdominal pain, numbness, tingling or focal deficit.  Patient on arrival said he needed to urinate.  Patient was able to urinate and after urination a bladder scanner showed 331 of urine in his bladder.        Nursing Notes were reviewed.    REVIEW OF SYSTEMS    (2-9 systems for level 4, 10 or more for level 5)     Review of Systems    Except as noted above the remainder of the review of systems was reviewed and negative.       PAST MEDICAL HISTORY     Past Medical History:   Diagnosis Date    Carotid artery occlusion 03/2010     tablet 650 mg (650 mg Oral Given 2/24/24 0024)   sodium chloride 0.9 % bolus 1,000 mL (0 mLs IntraVENous Stopped 2/24/24 0124)         Patient was reassessed multiple times while in the emergency department patient with improvement of symptoms at time of discharge    [unfilled]      CONSULTS: (Who and What was discussed)    I am the Primary Clinician of Record.    MDM  Number of Diagnoses or Management Options  COVID-19  Urinary tract infection in male  Diagnosis management comments: Patient presented with increased confusion.  Will workup for possible infectious source.  Will scan head for possible intracranial abnormalities.  Patient had approximately 3-31 in his bladder after voiding.  Patient voided again without difficulty.  Patient has a history of leukocytopenia.  Patient's white blood cell count is not too far from his previous baseline.  Patient's initial lactic acid was 2.2.  After 1 L fluid had improved to 1.  Patient was he had his teeth cleaned concern for possible transient bacteremia.  Patient provided Rocephin.  Blood cultures obtained.  After medications provided patient states he felt much better and repeat lactic acid was 1.  Patient was able to ambulate baseline with his walker and felt significantly better.  Spoke with patient's son who is bedside he states he feels comfortable patient being discharged home.          CRITICAL CARE TIME     PROCEDURES:  Unless otherwise noted below, none     Procedures    FINAL IMPRESSION      1. COVID-19    2. Urinary tract infection in male          DISPOSITION/PLAN   DISPOSITION Decision To Discharge 02/24/2024 01:55:18 AM      PATIENT REFERRED TO:  Memo Parrish MD  [address] [id number] [phone]    Call         DISCHARGE MEDICATIONS:  New Prescriptions    ACETAMINOPHEN (AMINOFEN) 325 MG TABLET    Take 2 tablets by mouth every 4 hours as needed for Fever    CEPHALEXIN (KEFLEX) 500 MG CAPSULE    Take 1 capsule by mouth 2 times

## 2024-02-24 NOTE — ED TRIAGE NOTES
Patient states he urinated himself twice at home. Patient states he usually walks but can't walk today and feels weaker than normal lung cancer diagnosis last month, patient is declining treatment

## 2024-02-25 LAB
BACTERIA BLD CULT ORG #2: NORMAL
BACTERIA BLD CULT: NORMAL

## 2024-02-26 ENCOUNTER — CARE COORDINATION (OUTPATIENT)
Dept: CARE COORDINATION | Age: 74
End: 2024-02-26

## 2024-02-26 LAB
FUNGUS SPEC CULT: NORMAL
LOEFFLER MB STN SPEC: NORMAL
PATH INTERP BLD-IMP: NORMAL

## 2024-02-26 NOTE — CARE COORDINATION
Patient contacted regarding COVID-19 diagnosis. Discussed COVID-19 related testing which was available at this time. Test results were positive. Patient informed of results, if available? Yes.      Ambulatory Care Manager contacted the patient by telephone to perform post discharge assessment. Call within 2 business days of discharge: Yes. Verified name and  with patient as identifiers. Provided introduction to self, and explanation of the CTN/ACM role, and reason for call due to risk factors for infection and/or exposure to COVID-19.     Symptoms reviewed with patient who verbalized the following symptoms: fatigue  cough  no new symptoms  no worsening symptoms  Patient reports he has dementia and he is at his baseline .      Due to no new or worsening symptoms encounter was not routed to provider for escalation. Discussed follow-up appointments. If no appointment was previously scheduled, appointment scheduling offered: patient declines; [facility]  [facility] follow up appointment(s):   Future Appointments   Date Time Provider Department Center   3/5/2024  9:00 AM SCHEDULE, SHRUTHI ROSARIO [facility]   3/12/2024  2:45 PM Brendon Urban MD [facility]         Non-face-to-face services provided:  Reviewed and followed up on pending diagnostic tests and treatments-COVID-19  Assessment and support for treatment adherence and medication management-Covid-19/DM     Advance Care Planning:   Does patient have an Advance Directive:   not reviewed .     Educated patient about risk for severe COVID-19 due to risk factors according to CDC guidelines. ACM reviewed discharge instructions, medical action plan and red flag symptoms with the patient who verbalized understanding. Discussed COVID vaccination status: No. Education provided on COVID-19 vaccination as appropriate. Discussed exposure protocols and quarantine with CDC Guidelines. Patient was given an opportunity to verbalize any questions and

## 2024-02-27 LAB
ACID FAST STN SPEC QL: NORMAL
MYCOBACTERIUM SPEC CULT: NORMAL

## 2024-02-28 LAB
BACTERIA BLD CULT ORG #2: NORMAL
BACTERIA BLD CULT: NORMAL

## 2024-02-28 NOTE — RESULT ENCOUNTER NOTE
Culture reviewed, results may be a reflection of patient's COVID infection should discuss further with his primary physician for long-term management

## 2024-02-29 PROBLEM — R91.8 LUNG MASS: Status: ACTIVE | Noted: 2023-12-19

## 2024-03-04 LAB
FUNGUS SPEC CULT: NORMAL
LOEFFLER MB STN SPEC: NORMAL

## 2024-03-05 ENCOUNTER — HOSPITAL ENCOUNTER (OUTPATIENT)
Dept: PULMONOLOGY | Age: 74
Discharge: HOME | End: 2024-03-05
Attending: INTERNAL MEDICINE
Payer: MEDICARE

## 2024-03-05 VITALS — OXYGEN SATURATION: 98 %

## 2024-03-05 DIAGNOSIS — C34.90 MALIGNANT NEOPLASM OF LUNG, UNSPECIFIED LATERALITY, UNSPECIFIED PART OF LUNG (HCC): ICD-10-CM

## 2024-03-05 LAB
ACID FAST STN SPEC QL: NORMAL
MYCOBACTERIUM SPEC CULT: NORMAL

## 2024-03-05 PROCEDURE — 94729 DIFFUSING CAPACITY: CPT

## 2024-03-05 PROCEDURE — 94760 N-INVAS EAR/PLS OXIMETRY 1: CPT

## 2024-03-05 PROCEDURE — 6370000000 HC RX 637 (ALT 250 FOR IP): Performed by: INTERNAL MEDICINE

## 2024-03-05 PROCEDURE — 94060 EVALUATION OF WHEEZING: CPT

## 2024-03-05 PROCEDURE — 94726 PLETHYSMOGRAPHY LUNG VOLUMES: CPT

## 2024-03-05 RX ORDER — ALBUTEROL SULFATE 90 UG/1
4 AEROSOL, METERED RESPIRATORY (INHALATION) ONCE
Status: COMPLETED | OUTPATIENT
Start: 2024-03-05 | End: 2024-03-05

## 2024-03-05 RX ADMIN — Medication 4 PUFF: at 09:23

## 2024-03-06 LAB
DLCO %PRED: 82 %
DLCO PRED: NORMAL
DLCO/VA %PRED: NORMAL
DLCO/VA PRED: NORMAL
DLCO/VA: NORMAL
DLCO: NORMAL
EXPIRATORY TIME-POST: NORMAL
EXPIRATORY TIME: NORMAL
FEF 25-75 %CHNG: NORMAL
FEF 25-75 POST %PRED: NORMAL
FEF 25-75% %PRED-PRE: NORMAL
FEF 25-75% PRED: NORMAL
FEF 25-75-POST: NORMAL
FEF 25-75-PRE: NORMAL
FEV1 %PRED-POST: 105 %
FEV1 %PRED-PRE: 104 %
FEV1 PRED: NORMAL
FEV1-POST: NORMAL
FEV1-PRE: NORMAL
FEV1/FVC %PRED-POST: 100 %
FEV1/FVC %PRED-PRE: 100 %
FEV1/FVC PRED: NORMAL
FEV1/FVC-POST: NORMAL
FEV1/FVC-PRE: NORMAL
FVC %PRED-POST: 105 L
FVC %PRED-PRE: 104 %
FVC PRED: NORMAL
FVC-POST: NORMAL
FVC-PRE: NORMAL
GAW %PRED: NORMAL
GAW PRED: NORMAL
GAW: NORMAL
IC PRE %PRED: NORMAL
IC PRED: NORMAL
IC: NORMAL
MEP: NORMAL
MIP: NORMAL
MVV %PRED-PRE: NORMAL
MVV PRED: NORMAL
MVV-PRE: NORMAL
PEF %PRED-POST: NORMAL
PEF %PRED-PRE: NORMAL
PEF PRED: NORMAL
PEF%CHNG: NORMAL
PEF-POST: NORMAL
PEF-PRE: NORMAL
RAW %PRED: NORMAL
RAW PRED: NORMAL
RAW: NORMAL
RV PRE %PRED: NORMAL
RV PRED: NORMAL
RV: NORMAL
SVC %PRED: NORMAL
SVC PRED: NORMAL
SVC: NORMAL
TLC PRE %PRED: 97 %
TLC PRED: NORMAL
TLC: NORMAL
VA %PRED: NORMAL
VA PRED: NORMAL
VA: NORMAL
VTG %PRED: NORMAL
VTG PRED: NORMAL
VTG: NORMAL

## 2024-03-06 ASSESSMENT — PULMONARY FUNCTION TESTS
FVC_PERCENT_PREDICTED_POST: 105
FEV1_PERCENT_PREDICTED_POST: 105
FEV1/FVC_PERCENT_PREDICTED_PRE: 100
FVC_PERCENT_PREDICTED_PRE: 104
FEV1/FVC_PERCENT_PREDICTED_POST: 100
FEV1_PERCENT_PREDICTED_PRE: 104

## 2024-03-06 NOTE — PROCEDURES
[address] [address]                           PULMONARY FUNCTION      PATIENT NAME: DELVIS ODEN               : 1950  MED REC NO: [id number]                      ROOM:   ACCOUNT NO: [id number]                       ADMIT DATE: 2024  PROVIDER: Marry Bishop MD      DATE OF PROCEDURE:      The patient is a 73-year-old male, who underwent a PFT for malignant neoplasm of the lung.  The patient's spirometry shows FVC to be 104%, FEV1 to be 104%, FEV1/FVC ratio was 100%, and BKQ53-40% was 107%.  The patient did not have any significant post-bronchodilator improvement.  Lung volumes showed that total lung capacity was normal.  The patient has some air trapping.  The patient also has some decrease in ERV.  The patient's diffusing capacity when adjusted for volume was normal.  Flow volume was normal.  On the basis of PFT, the patient has essentially normal PFT except for some changes in the PFT parameters because of body habitus.  Please correlate clinically.          MARRY BISHOP MD      D:  2024 17:12:58     T:  2024 08:37:46     SK/TAVON  Job #:  [id number]     Doc#:  [id number]

## 2024-03-12 ENCOUNTER — OFFICE VISIT (OUTPATIENT)
Dept: PULMONOLOGY | Age: 74
End: 2024-03-12
Payer: MEDICARE

## 2024-03-12 VITALS — WEIGHT: 185 LBS | BODY MASS INDEX: 29.03 KG/M2 | HEIGHT: 67 IN | RESPIRATION RATE: 18 BRPM

## 2024-03-12 DIAGNOSIS — C34.90 MALIGNANT NEOPLASM OF LUNG, UNSPECIFIED LATERALITY, UNSPECIFIED PART OF LUNG (HCC): Primary | ICD-10-CM

## 2024-03-12 LAB
ACID FAST STN SPEC QL: NORMAL
MYCOBACTERIUM SPEC CULT: NORMAL

## 2024-03-12 PROCEDURE — 3017F COLORECTAL CA SCREEN DOC REV: CPT | Performed by: INTERNAL MEDICINE

## 2024-03-12 PROCEDURE — G8484 FLU IMMUNIZE NO ADMIN: HCPCS | Performed by: INTERNAL MEDICINE

## 2024-03-12 PROCEDURE — G8427 DOCREV CUR MEDS BY ELIG CLIN: HCPCS | Performed by: INTERNAL MEDICINE

## 2024-03-12 PROCEDURE — 1123F ACP DISCUSS/DSCN MKR DOCD: CPT | Performed by: INTERNAL MEDICINE

## 2024-03-12 PROCEDURE — 99214 OFFICE O/P EST MOD 30 MIN: CPT | Performed by: INTERNAL MEDICINE

## 2024-03-12 PROCEDURE — G8419 CALC BMI OUT NRM PARAM NOF/U: HCPCS | Performed by: INTERNAL MEDICINE

## 2024-03-12 PROCEDURE — 1036F TOBACCO NON-USER: CPT | Performed by: INTERNAL MEDICINE

## 2024-03-12 NOTE — PROGRESS NOTES
participate in [facility]. I will keep following with you ,should you have any concerns ,please contact us at [place] [facility]     Sincerely,        Brendon Urban MD  Pulmonary & Critical Care Medicine     NOTE: This report was transcribed using voice recognition software. Every effort was made to ensure accuracy; however, inadvertent computerized transcription errors may be present.

## 2024-03-13 ENCOUNTER — TELEPHONE (OUTPATIENT)
Dept: PULMONOLOGY | Age: 74
End: 2024-03-13

## 2024-03-13 NOTE — TELEPHONE ENCOUNTER
Pt called and stated that [facility] prior to pt scheduling. Pt is requesting that the order be sent. Pt requested a c/b to confirm that the referral has be faxed. Please advise.

## 2024-03-14 NOTE — TELEPHONE ENCOUNTER
Pt called back again asking if fax can be sent.  Printed and faxed referral with visit note to [facility] at [phone].

## 2024-03-19 LAB
ACID FAST STN SPEC QL: NORMAL
MYCOBACTERIUM SPEC CULT: NORMAL

## 2024-03-19 NOTE — TELEPHONE ENCOUNTER
Tried to call Pt stated number was not in service. Will try again at a different time.  Trying to see if Pt has heard from [facility]

## 2024-06-12 ENCOUNTER — TELEPHONE (OUTPATIENT)
Dept: PULMONOLOGY | Age: 74
End: 2024-06-12

## 2024-06-12 NOTE — TELEPHONE ENCOUNTER
Called patient to cancel his appointment on the 06/19/2024 and kym said that he is now going to [facility] and didn't think he still need to see . He said if thing get better for him, he will make appointment but at this time he would like to wait.

## 2024-07-24 ENCOUNTER — HOSPITAL ENCOUNTER (OUTPATIENT)
Dept: CT IMAGING | Age: 74
Discharge: HOME | End: 2024-07-24
Attending: RADIOLOGY
Payer: OTHER GOVERNMENT

## 2024-07-24 DIAGNOSIS — C34.90 NON-SMALL CELL LUNG CANCER, UNSPECIFIED LATERALITY (HCC): ICD-10-CM

## 2024-07-24 PROCEDURE — 71250 CT THORAX DX C-: CPT

## 2024-08-16 ENCOUNTER — HOSPITAL ENCOUNTER (OUTPATIENT)
Dept: CARDIOLOGY | Age: 74
End: 2024-08-16
Payer: MEDICARE

## 2024-08-16 VITALS
WEIGHT: 185 LBS | BODY MASS INDEX: 29.03 KG/M2 | SYSTOLIC BLOOD PRESSURE: 131 MMHG | HEIGHT: 67 IN | DIASTOLIC BLOOD PRESSURE: 87 MMHG

## 2024-08-16 DIAGNOSIS — I95.9 HYPOTENSION, UNSPECIFIED HYPOTENSION TYPE: ICD-10-CM

## 2024-08-16 LAB
ECHO AO ASC DIAM: 3.6 CM
ECHO AO ASCENDING AORTA INDEX: 1.84 CM/M2
ECHO AO ROOT DIAM: 3 CM
ECHO AO ROOT INDEX: 1.53 CM/M2
ECHO AV CUSP MM: 1.7 CM
ECHO AV PEAK GRADIENT: 7 MMHG
ECHO AV PEAK GRADIENT: 7 MMHG
ECHO AV PEAK VELOCITY: 1.3 M/S
ECHO AV VELOCITY RATIO: 0.54
ECHO BSA: 1.99 M2
ECHO LA AREA 2C: 16.1 CM2
ECHO LA AREA 4C: 19.4 CM2
ECHO LA DIAMETER INDEX: 2.19 CM/M2
ECHO LA DIAMETER: 4.3 CM
ECHO LA MAJOR AXIS: 5.8 CM
ECHO LA MINOR AXIS: 4.6 CM
ECHO LA TO AORTIC ROOT RATIO: 1.43
ECHO LA VOL MOD A2C: 45 ML (ref 18–58)
ECHO LA VOL MOD A4C: 51 ML (ref 18–58)
ECHO LA VOLUME INDEX MOD A2C: 23 ML/M2 (ref 16–34)
ECHO LA VOLUME INDEX MOD A4C: 26 ML/M2 (ref 16–34)
ECHO LV E' LATERAL VELOCITY: 11 CM/S
ECHO LV E' SEPTAL VELOCITY: 8 CM/S
ECHO LV FRACTIONAL SHORTENING: 44 % (ref 28–44)
ECHO LV INTERNAL DIMENSION DIASTOLE INDEX: 2.45 CM/M2
ECHO LV INTERNAL DIMENSION DIASTOLIC: 4.8 CM (ref 4.2–5.9)
ECHO LV INTERNAL DIMENSION SYSTOLIC INDEX: 1.38 CM/M2
ECHO LV INTERNAL DIMENSION SYSTOLIC: 2.7 CM
ECHO LV ISOVOLUMETRIC RELAXATION TIME (IVRT): 74 MS
ECHO LV IVSD: 1 CM (ref 0.6–1)
ECHO LV MASS 2D: 170.2 G (ref 88–224)
ECHO LV MASS INDEX 2D: 86.8 G/M2 (ref 49–115)
ECHO LV POSTERIOR WALL DIASTOLIC: 1 CM (ref 0.6–1)
ECHO LV RELATIVE WALL THICKNESS RATIO: 0.42
ECHO LVOT PEAK GRADIENT: 2 MMHG
ECHO LVOT PEAK VELOCITY: 0.7 M/S
ECHO MV A VELOCITY: 0.73 M/S
ECHO MV E DECELERATION TIME (DT): 211 MS
ECHO MV E VELOCITY: 0.56 M/S
ECHO MV E/A RATIO: 0.77
ECHO MV E/E' LATERAL: 5.09
ECHO MV E/E' RATIO (AVERAGED): 6.05
ECHO MV E/E' SEPTAL: 7
ECHO RA AREA 4C: 9.6 CM2
ECHO RA END SYSTOLIC VOLUME APICAL 4 CHAMBER INDEX BSA: 8 ML/M2
ECHO RA VOLUME: 16 ML
ECHO RV FREE WALL PEAK S': 16 CM/S
ECHO RV TAPSE: 2 CM (ref 1.7–?)

## 2024-08-16 PROCEDURE — 93306 TTE W/DOPPLER COMPLETE: CPT

## 2024-08-16 PROCEDURE — 93306 TTE W/DOPPLER COMPLETE: CPT | Performed by: INTERNAL MEDICINE

## 2024-10-22 ENCOUNTER — OFFICE VISIT (OUTPATIENT)
Age: 74
End: 2024-10-22
Payer: OTHER GOVERNMENT

## 2024-10-22 VITALS
DIASTOLIC BLOOD PRESSURE: 72 MMHG | HEIGHT: 67 IN | OXYGEN SATURATION: 97 % | WEIGHT: 170 LBS | BODY MASS INDEX: 26.68 KG/M2 | SYSTOLIC BLOOD PRESSURE: 116 MMHG | HEART RATE: 93 BPM

## 2024-10-22 DIAGNOSIS — G62.9 CRANIAL NEUROPATHY: Primary | ICD-10-CM

## 2024-10-22 DIAGNOSIS — I69.398 CVA, OLD, DISTURBANCES OF VISION: ICD-10-CM

## 2024-10-22 DIAGNOSIS — H53.9 CVA, OLD, DISTURBANCES OF VISION: ICD-10-CM

## 2024-10-22 PROCEDURE — 1123F ACP DISCUSS/DSCN MKR DOCD: CPT | Performed by: PSYCHIATRY & NEUROLOGY

## 2024-10-22 PROCEDURE — 1036F TOBACCO NON-USER: CPT | Performed by: PSYCHIATRY & NEUROLOGY

## 2024-10-22 PROCEDURE — 3017F COLORECTAL CA SCREEN DOC REV: CPT | Performed by: PSYCHIATRY & NEUROLOGY

## 2024-10-22 PROCEDURE — G8419 CALC BMI OUT NRM PARAM NOF/U: HCPCS | Performed by: PSYCHIATRY & NEUROLOGY

## 2024-10-22 PROCEDURE — 3074F SYST BP LT 130 MM HG: CPT | Performed by: PSYCHIATRY & NEUROLOGY

## 2024-10-22 PROCEDURE — 3078F DIAST BP <80 MM HG: CPT | Performed by: PSYCHIATRY & NEUROLOGY

## 2024-10-22 PROCEDURE — 99214 OFFICE O/P EST MOD 30 MIN: CPT | Performed by: PSYCHIATRY & NEUROLOGY

## 2024-10-22 PROCEDURE — G8427 DOCREV CUR MEDS BY ELIG CLIN: HCPCS | Performed by: PSYCHIATRY & NEUROLOGY

## 2024-10-22 PROCEDURE — G8484 FLU IMMUNIZE NO ADMIN: HCPCS | Performed by: PSYCHIATRY & NEUROLOGY

## 2024-10-22 RX ORDER — TRAMADOL HYDROCHLORIDE 50 MG/1
50 TABLET ORAL EVERY 6 HOURS PRN
COMMUNITY

## 2024-10-22 RX ORDER — ACETAMINOPHEN 160 MG
2000 TABLET,DISINTEGRATING ORAL 2 TIMES DAILY
COMMUNITY

## 2024-10-22 RX ORDER — ASPIRIN 81 MG/1
81 TABLET ORAL DAILY
Qty: 30 TABLET | Refills: 2 | Status: SHIPPED | OUTPATIENT
Start: 2024-10-22

## 2024-10-22 RX ORDER — CLOPIDOGREL BISULFATE 75 MG/1
75 TABLET ORAL DAILY
Qty: 30 TABLET | Refills: 0 | Status: SHIPPED | OUTPATIENT
Start: 2024-10-22

## 2024-10-22 RX ORDER — MECLIZINE HCL 25MG 25 MG/1
25 TABLET, CHEWABLE ORAL 2 TIMES DAILY PRN
COMMUNITY
Start: 2024-09-30

## 2024-10-22 NOTE — PROGRESS NOTES
Neurology outpatient follow-up visit    Patient name: Nhan Cortez      Chief Complaint:  Central vertigo.    History of present illness:  This is a 73 years old right-handed male.  The patient was brought to the ER yesterday due to worsening of vestibular syndrome.  The patient has had refractory unsteadiness and lightheadedness for 3 months.  The patient denies significant spinning sensation.  It is getting worse with changing position, especially in the morning.  It can last up to 5 to 10 minutes.  It can occur many times a day.  Yesterday, the patient developed prolonged episode without spontaneous improvement.  Upon admission, the patient is noted for elevated blood pressure.  The patient denies focal weakness or numbness.  The patient also denies significant headache.  The patient is noted for underlying peripheral neuropathy on both legs.  The patient has had uncontrolled diabetes for many years.    The patient remains to have evidence of ataxia, especially sensory ataxia.  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  CT brain showed no significant ischemic stroke or intracerebral hemorrhage.  MRI brain showed no acute infarction but chronic white matter changes and significant brain atrophy.  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  At that time, the MRI brain showed no acute infarction but significant chronic white matter changes and brain atrophy.  The patient was discharged with memantine.  However, the patient denies significant benefit from memantine.  The patient also had history of B12 deficiency and iron deficiency.  The last checkup was back in 2020 per our record.  The patient was also found newly diagnosed lung tumor recently.    10/22/24: The patient is here for follow-up after the patient developed a new onset

## 2024-10-22 NOTE — PATIENT INSTRUCTIONS
YOU MUST CONFIRM YOUR APPOINTMENT 1 DAY PRIOR OR IT WILL BE CANCELLED!!   Our office will call you 3 times the day prior to your appointment in an attempt to confirm.  Please return our call ASAP or confirm your appt through [employer] no later than 3 pm the day before your appointment.  If we do not hear back from you by 3 pm to confirm, your appointment will be cancelled & someone will be added into that slot from our wait list.

## 2024-11-06 ENCOUNTER — HOSPITAL ENCOUNTER (OUTPATIENT)
Dept: MRI IMAGING | Age: 74
Discharge: HOME | End: 2024-11-06
Attending: PSYCHIATRY & NEUROLOGY
Payer: OTHER GOVERNMENT

## 2024-11-06 DIAGNOSIS — I69.398 CVA, OLD, DISTURBANCES OF VISION: ICD-10-CM

## 2024-11-06 DIAGNOSIS — H53.9 CVA, OLD, DISTURBANCES OF VISION: ICD-10-CM

## 2024-11-06 DIAGNOSIS — G62.9 CRANIAL NEUROPATHY: ICD-10-CM

## 2024-11-06 PROCEDURE — 70551 MRI BRAIN STEM W/O DYE: CPT

## 2024-11-13 ENCOUNTER — TELEPHONE (OUTPATIENT)
Age: 74
End: 2024-11-13

## 2024-11-14 NOTE — TELEPHONE ENCOUNTER
Spoke with pt - informed of results and recommendations to remain on Asa & Plavix until f/u in January.

## 2024-11-14 NOTE — TELEPHONE ENCOUNTER
The MRI did not  any new stroke.    So this is actually nerve damage.    Will keep him on DAPT until the next F/U appt.

## 2024-11-25 ENCOUNTER — HOSPITAL ENCOUNTER (OUTPATIENT)
Dept: CT IMAGING | Age: 74
Discharge: HOME | End: 2024-11-25
Attending: RADIOLOGY
Payer: MEDICARE

## 2024-11-25 DIAGNOSIS — C34.90 NON-SMALL CELL LUNG CANCER, UNSPECIFIED LATERALITY (HCC): ICD-10-CM

## 2024-11-25 PROCEDURE — 71250 CT THORAX DX C-: CPT

## 2025-01-22 ENCOUNTER — TELEPHONE (OUTPATIENT)
Age: 75
End: 2025-01-22

## 2025-01-22 NOTE — TELEPHONE ENCOUNTER
Pt cancelled 3 mo follow up with Dr. Salmeron on 1/23/25. Pt stated he was sick. Pt stated he would cb to reschedule.

## 2025-07-10 ENCOUNTER — TRANSCRIBE ORDERS (OUTPATIENT)
Dept: ADMINISTRATIVE | Age: 75
End: 2025-07-10

## 2025-07-10 DIAGNOSIS — C34.12 SQUAMOUS CELL CARCINOMA OF BRONCHUS IN LEFT UPPER LOBE (HCC): Primary | ICD-10-CM

## 2025-07-10 DIAGNOSIS — N64.59: Primary | ICD-10-CM

## 2025-07-22 ENCOUNTER — TRANSCRIBE ORDERS (OUTPATIENT)
Dept: ADMINISTRATIVE | Age: 75
End: 2025-07-22

## 2025-07-22 DIAGNOSIS — D61.818 PANCYTOPENIA (HCC): ICD-10-CM

## 2025-07-22 DIAGNOSIS — N64.4 BREAST TENDERNESS: ICD-10-CM

## 2025-07-22 DIAGNOSIS — D46.9 MYELODYSPLASTIC SYNDROME (HCC): Primary | ICD-10-CM

## 2025-07-22 DIAGNOSIS — C34.12 SQUAMOUS CELL CARCINOMA OF BRONCHUS IN LEFT UPPER LOBE (HCC): ICD-10-CM

## 2025-07-28 ENCOUNTER — HOSPITAL ENCOUNTER (OUTPATIENT)
Dept: MAMMOGRAPHY | Age: 75
Discharge: HOME | End: 2025-07-28
Payer: OTHER GOVERNMENT

## 2025-07-28 ENCOUNTER — APPOINTMENT (OUTPATIENT)
Dept: ULTRASOUND IMAGING | Age: 75
End: 2025-07-28
Payer: OTHER GOVERNMENT

## 2025-07-28 VITALS — WEIGHT: 153 LBS | BODY MASS INDEX: 24.01 KG/M2 | HEIGHT: 67 IN

## 2025-07-28 DIAGNOSIS — N64.4 BREAST TENDERNESS: ICD-10-CM

## 2025-07-28 DIAGNOSIS — D46.9 MYELODYSPLASTIC SYNDROME (HCC): ICD-10-CM

## 2025-07-28 DIAGNOSIS — C34.12 SQUAMOUS CELL CARCINOMA OF BRONCHUS IN LEFT UPPER LOBE (HCC): ICD-10-CM

## 2025-07-28 DIAGNOSIS — D61.818 PANCYTOPENIA (HCC): ICD-10-CM

## 2025-07-28 PROCEDURE — G0279 TOMOSYNTHESIS, MAMMO: HCPCS

## 2025-08-20 ENCOUNTER — HOSPITAL ENCOUNTER (OUTPATIENT)
Dept: CT IMAGING | Age: 75
Discharge: HOME | End: 2025-08-20
Payer: MEDICARE

## 2025-08-20 DIAGNOSIS — C34.12 SQUAMOUS CELL CARCINOMA OF BRONCHUS IN LEFT UPPER LOBE (HCC): ICD-10-CM

## 2025-08-20 PROCEDURE — 71250 CT THORAX DX C-: CPT

## (undated) DEVICE — BRUSH CYTO L150CM CHN L2MM BRIST DIA1.9MM PTFE S STL BULL

## (undated) DEVICE — KIT INTRODUCER BRONCHOSCOPE PATIENT

## (undated) DEVICE — SUTURE VCRL + SZ 2-0 L27IN ABSRB CLR CT-1 1/2 CIR TAPERCUT VCP259H

## (undated) DEVICE — GAUZE,SPONGE,2"X2",8PLY,STERILE,LF,2'S: Brand: MEDLINE

## (undated) DEVICE — ADAPTER TBNG DIA15MM SWVL FBROPT BRONCHSCP TERM 2 AXIS PEEP

## (undated) DEVICE — JP PERF DRN SIL FLT 7MM FULL: Brand: CARDINAL HEALTH

## (undated) DEVICE — COVER US PRB W12XL244CM SURGICAL INTRAOPERATIVE PLAS TAPR L

## (undated) DEVICE — DISH PETRI ST LF

## (undated) DEVICE — AIRLIFE™ ADULT AEROSOL MASK VINYL, UNDER-THE-CHIN STYLE: Brand: AIRLIFE™

## (undated) DEVICE — GOWN SIRUS NONREIN XL W/TWL: Brand: MEDLINE INDUSTRIES, INC.

## (undated) DEVICE — 3M™ TEGADERM™ TRANSPARENT FILM DRESSING FRAME STYLE, 1624W, 2-3/8 IN X 2-3/4 IN (6 CM X 7 CM), 100/CT 4CT/CASE: Brand: 3M™ TEGADERM™

## (undated) DEVICE — Device: Brand: MEDEX

## (undated) DEVICE — TUBING FLUIDICS BRONCHOSCOPE

## (undated) DEVICE — GLOVE SURG SZ 8 L11.77IN FNGR THK9.8MIL STRW LTX POLYMER

## (undated) DEVICE — SUTURE VCRL + SZ 3-0 L27IN ABSRB UD L26MM SH 1/2 CIR VCP416H

## (undated) DEVICE — SYRINGE 20ML LL S/C 50

## (undated) DEVICE — SUTURE NONABSORBABLE MONOFILAMENT 6-0 C-1 1X30 IN PROLENE 8706H

## (undated) DEVICE — SYRINGE MED 10ML SLIP TIP BLNT FILL AND LUERLOCK DISP

## (undated) DEVICE — NEBULIZER AEROSOL TBNG 7 FT FOR ACUTE CARE NEBUTECH

## (undated) DEVICE — SUTURE NONABSORBABLE MONOFILAMENT 7-0 BV-1 1X24 IN PROLENE 8702H

## (undated) DEVICE — SUTURE MCRYL + SZ 4-0 L18IN ABSRB UD L19MM PS-2 3/8 CIR MCP496G

## (undated) DEVICE — SINGLE USE SUCTION VALVE MAJ-209: Brand: SINGLE USE SUCTION VALVE (STERILE)

## (undated) DEVICE — DISPOSABLE BIOPSY FORCEPS: Brand: DISPOSABLE BIOPSY FORCEPS

## (undated) DEVICE — FORCEPS BIOPSY SMOOTH CUP

## (undated) DEVICE — VALVE SHEATH BRONCHOSCOPE

## (undated) DEVICE — GOWN SIRUS NONREIN LG W/TWL: Brand: MEDLINE INDUSTRIES, INC.

## (undated) DEVICE — SINGLE USE BIOPSY VALVE MAJ-210: Brand: SINGLE USE BIOPSY VALVE (STERILE)

## (undated) DEVICE — Device

## (undated) DEVICE — ENDO CARRY-ON PROCEDURE KIT INCLUDES SUCTION TUBING, LUBRICANT, GAUZE, BIOHAZARD STICKER, TRANSPORT PAD AND INTERCEPT BEDSIDE KIT.: Brand: ENDO CARRY-ON PROCEDURE KIT

## (undated) DEVICE — SHEET,DRAPE,40X58,STERILE: Brand: MEDLINE

## (undated) DEVICE — SYRINGE MED 50ML LUERSLIP TIP

## (undated) DEVICE — SUTURE PERMA-HAND SZ 3-0 L18IN NONABSORBABLE BLK RB-1 L17MM C053D

## (undated) DEVICE — BLOCK BITE ENDOSCOPIC LF FM PROTCT

## (undated) DEVICE — SHUNT NEUROSURGICAL L10CM DIA3X4MM CAR STR FULL SPR REINF

## (undated) DEVICE — MAGNETIC INSTRUMENT PAD 10" X 16"; MEDIUM; DISPOSABLE: Brand: CARDINAL HEALTH

## (undated) DEVICE — BRONCHOSCOPES MONARCH

## (undated) DEVICE — SURGIFOAM SPNG SZ 12-7

## (undated) DEVICE — FOGARTY - HYDRAGRIP SURGICAL - CLAMP INSERTS: Brand: FOGARTY SOFTJAW

## (undated) DEVICE — CATHETER URETH 18FR RED RUB INTMIT ALL PURP

## (undated) DEVICE — SOLUTION IV IRRIG POUR BRL 0.9% SODIUM CHL 2F7124

## (undated) DEVICE — BRUSH BIOPSY CYTOLOGY BRONCH FIB

## (undated) DEVICE — NEEDLE HYPO 22GA L1.5IN BLK POLYPR HUB S STL REG BVL STR

## (undated) DEVICE — NEEDLE ASPIR 21GA L700MM US GUID TREAT DST END FOR EFFICIENT

## (undated) DEVICE — STAPLER EXT SKIN 35 WIDE S STL STPL SQUEEZE HNDL VISISTAT